# Patient Record
Sex: MALE | Race: WHITE | NOT HISPANIC OR LATINO | Employment: UNEMPLOYED | URBAN - METROPOLITAN AREA
[De-identification: names, ages, dates, MRNs, and addresses within clinical notes are randomized per-mention and may not be internally consistent; named-entity substitution may affect disease eponyms.]

---

## 2020-12-21 ENCOUNTER — CLINICAL SUPPORT (OUTPATIENT)
Dept: URGENT CARE | Facility: CLINIC | Age: 30
End: 2020-12-21
Payer: COMMERCIAL

## 2020-12-21 DIAGNOSIS — Z20.822 CLOSE EXPOSURE TO COVID-19 VIRUS: Primary | ICD-10-CM

## 2020-12-21 LAB
CTP QC/QA: YES
SARS-COV-2 RDRP RESP QL NAA+PROBE: NEGATIVE

## 2020-12-21 PROCEDURE — U0002: ICD-10-PCS | Mod: QW,S$GLB,, | Performed by: FAMILY MEDICINE

## 2020-12-21 PROCEDURE — U0002 COVID-19 LAB TEST NON-CDC: HCPCS | Mod: QW,S$GLB,, | Performed by: FAMILY MEDICINE

## 2021-01-07 ENCOUNTER — CLINICAL SUPPORT (OUTPATIENT)
Dept: URGENT CARE | Facility: CLINIC | Age: 31
End: 2021-01-07
Payer: COMMERCIAL

## 2021-01-07 DIAGNOSIS — R05.9 COUGH: Primary | ICD-10-CM

## 2021-01-07 LAB
CTP QC/QA: YES
SARS-COV-2 RDRP RESP QL NAA+PROBE: NEGATIVE

## 2021-01-07 PROCEDURE — U0002: ICD-10-PCS | Mod: QW,S$GLB,, | Performed by: FAMILY MEDICINE

## 2021-01-07 PROCEDURE — U0002 COVID-19 LAB TEST NON-CDC: HCPCS | Mod: QW,S$GLB,, | Performed by: FAMILY MEDICINE

## 2021-01-26 ENCOUNTER — OFFICE VISIT (OUTPATIENT)
Dept: URGENT CARE | Facility: CLINIC | Age: 31
End: 2021-01-26
Payer: COMMERCIAL

## 2021-01-26 VITALS
OXYGEN SATURATION: 98 % | HEIGHT: 60 IN | HEART RATE: 85 BPM | SYSTOLIC BLOOD PRESSURE: 125 MMHG | DIASTOLIC BLOOD PRESSURE: 73 MMHG | RESPIRATION RATE: 16 BRPM | WEIGHT: 95 LBS | BODY MASS INDEX: 18.65 KG/M2 | TEMPERATURE: 98 F

## 2021-01-26 DIAGNOSIS — J02.9 SORE THROAT: Primary | ICD-10-CM

## 2021-01-26 LAB
CTP QC/QA: YES
SARS-COV-2 RDRP RESP QL NAA+PROBE: NEGATIVE

## 2021-01-26 PROCEDURE — 3008F PR BODY MASS INDEX (BMI) DOCUMENTED: ICD-10-PCS | Mod: CPTII,S$GLB,, | Performed by: PHYSICIAN ASSISTANT

## 2021-01-26 PROCEDURE — 99203 OFFICE O/P NEW LOW 30 MIN: CPT | Mod: S$GLB,,, | Performed by: PHYSICIAN ASSISTANT

## 2021-01-26 PROCEDURE — U0002 COVID-19 LAB TEST NON-CDC: HCPCS | Mod: QW,S$GLB,, | Performed by: PHYSICIAN ASSISTANT

## 2021-01-26 PROCEDURE — 3008F BODY MASS INDEX DOCD: CPT | Mod: CPTII,S$GLB,, | Performed by: PHYSICIAN ASSISTANT

## 2021-01-26 PROCEDURE — 99203 PR OFFICE/OUTPT VISIT, NEW, LEVL III, 30-44 MIN: ICD-10-PCS | Mod: S$GLB,,, | Performed by: PHYSICIAN ASSISTANT

## 2021-01-26 PROCEDURE — U0002: ICD-10-PCS | Mod: QW,S$GLB,, | Performed by: PHYSICIAN ASSISTANT

## 2021-01-26 RX ORDER — ESTRADIOL 2 MG/1
2 TABLET ORAL DAILY
COMMUNITY
Start: 2021-01-04 | End: 2021-03-08

## 2021-01-26 RX ORDER — SPIRONOLACTONE 25 MG/1
25 TABLET ORAL DAILY
COMMUNITY
Start: 2021-01-04 | End: 2021-03-08

## 2021-02-02 ENCOUNTER — OFFICE VISIT (OUTPATIENT)
Dept: ENDOCRINOLOGY | Facility: CLINIC | Age: 31
End: 2021-02-02
Payer: COMMERCIAL

## 2021-02-02 ENCOUNTER — LAB VISIT (OUTPATIENT)
Dept: LAB | Facility: HOSPITAL | Age: 31
End: 2021-02-02
Attending: INTERNAL MEDICINE
Payer: COMMERCIAL

## 2021-02-02 VITALS
WEIGHT: 100.06 LBS | OXYGEN SATURATION: 98 % | HEART RATE: 89 BPM | HEIGHT: 60 IN | BODY MASS INDEX: 19.65 KG/M2 | SYSTOLIC BLOOD PRESSURE: 125 MMHG | DIASTOLIC BLOOD PRESSURE: 83 MMHG

## 2021-02-02 DIAGNOSIS — E29.1 HYPOGONADISM IN MALE: ICD-10-CM

## 2021-02-02 DIAGNOSIS — E03.9 ACQUIRED HYPOTHYROIDISM: ICD-10-CM

## 2021-02-02 DIAGNOSIS — Z85.841 H/O ASTROCYTOMA: ICD-10-CM

## 2021-02-02 DIAGNOSIS — E11.9 TYPE 2 DIABETES MELLITUS WITHOUT COMPLICATION, WITHOUT LONG-TERM CURRENT USE OF INSULIN: ICD-10-CM

## 2021-02-02 DIAGNOSIS — E78.2 MIXED DYSLIPIDEMIA: ICD-10-CM

## 2021-02-02 LAB
25(OH)D3+25(OH)D2 SERPL-MCNC: 31 NG/ML (ref 30–96)
ALBUMIN SERPL BCP-MCNC: 4.2 G/DL (ref 3.5–5.2)
ALP SERPL-CCNC: 59 U/L (ref 55–135)
ALT SERPL W/O P-5'-P-CCNC: 23 U/L (ref 10–44)
ANION GAP SERPL CALC-SCNC: 12 MMOL/L (ref 8–16)
AST SERPL-CCNC: 20 U/L (ref 10–40)
BILIRUB SERPL-MCNC: 0.2 MG/DL (ref 0.1–1)
BUN SERPL-MCNC: 24 MG/DL (ref 6–20)
C PEPTIDE SERPL-MCNC: 3.18 NG/ML (ref 0.78–5.19)
CALCIUM SERPL-MCNC: 9.5 MG/DL (ref 8.7–10.5)
CHLORIDE SERPL-SCNC: 111 MMOL/L (ref 95–110)
CHOLEST SERPL-MCNC: 174 MG/DL (ref 120–199)
CHOLEST/HDLC SERPL: 2.5 {RATIO} (ref 2–5)
CO2 SERPL-SCNC: 19 MMOL/L (ref 23–29)
CREAT SERPL-MCNC: 1.2 MG/DL (ref 0.5–1.4)
ERYTHROCYTE [DISTWIDTH] IN BLOOD BY AUTOMATED COUNT: 12.9 % (ref 11.5–14.5)
EST. GFR  (AFRICAN AMERICAN): >60 ML/MIN/1.73 M^2
EST. GFR  (NON AFRICAN AMERICAN): >60 ML/MIN/1.73 M^2
ESTIMATED AVG GLUCOSE: 108 MG/DL (ref 68–131)
ESTRADIOL SERPL-MCNC: 64 PG/ML (ref 11–44)
FSH SERPL-ACNC: 7.5 MIU/ML (ref 0.95–11.95)
GLUCOSE SERPL-MCNC: 98 MG/DL (ref 70–110)
HBA1C MFR BLD: 5.4 % (ref 4–5.6)
HCT VFR BLD AUTO: 35.2 % (ref 40–54)
HDLC SERPL-MCNC: 71 MG/DL (ref 40–75)
HDLC SERPL: 40.8 % (ref 20–50)
HGB BLD-MCNC: 11.6 G/DL (ref 14–18)
LDLC SERPL CALC-MCNC: 81.6 MG/DL (ref 63–159)
LH SERPL-ACNC: 7.2 MIU/ML (ref 0.6–12.1)
MCH RBC QN AUTO: 28.9 PG (ref 27–31)
MCHC RBC AUTO-ENTMCNC: 33 G/DL (ref 32–36)
MCV RBC AUTO: 88 FL (ref 82–98)
NONHDLC SERPL-MCNC: 103 MG/DL
PLATELET # BLD AUTO: 299 K/UL (ref 150–350)
PMV BLD AUTO: 10 FL (ref 9.2–12.9)
POTASSIUM SERPL-SCNC: 4.8 MMOL/L (ref 3.5–5.1)
PROT SERPL-MCNC: 8.2 G/DL (ref 6–8.4)
RBC # BLD AUTO: 4.01 M/UL (ref 4.6–6.2)
SODIUM SERPL-SCNC: 142 MMOL/L (ref 136–145)
T4 FREE SERPL-MCNC: 0.97 NG/DL (ref 0.71–1.51)
THYROPEROXIDASE IGG SERPL-ACNC: <6 IU/ML
TRIGL SERPL-MCNC: 107 MG/DL (ref 30–150)
TSH SERPL DL<=0.005 MIU/L-ACNC: 4.17 UIU/ML (ref 0.4–4)
WBC # BLD AUTO: 6.99 K/UL (ref 3.9–12.7)

## 2021-02-02 PROCEDURE — 86341 ISLET CELL ANTIBODY: CPT

## 2021-02-02 PROCEDURE — 83001 ASSAY OF GONADOTROPIN (FSH): CPT

## 2021-02-02 PROCEDURE — 1125F AMNT PAIN NOTED PAIN PRSNT: CPT | Mod: S$GLB,,, | Performed by: INTERNAL MEDICINE

## 2021-02-02 PROCEDURE — 3008F PR BODY MASS INDEX (BMI) DOCUMENTED: ICD-10-PCS | Mod: CPTII,S$GLB,, | Performed by: INTERNAL MEDICINE

## 2021-02-02 PROCEDURE — 80053 COMPREHEN METABOLIC PANEL: CPT

## 2021-02-02 PROCEDURE — 84403 ASSAY OF TOTAL TESTOSTERONE: CPT

## 2021-02-02 PROCEDURE — 99204 PR OFFICE/OUTPT VISIT, NEW, LEVL IV, 45-59 MIN: ICD-10-PCS | Mod: S$GLB,,, | Performed by: INTERNAL MEDICINE

## 2021-02-02 PROCEDURE — 86376 MICROSOMAL ANTIBODY EACH: CPT

## 2021-02-02 PROCEDURE — 85027 COMPLETE CBC AUTOMATED: CPT

## 2021-02-02 PROCEDURE — 84443 ASSAY THYROID STIM HORMONE: CPT

## 2021-02-02 PROCEDURE — 99999 PR PBB SHADOW E&M-EST. PATIENT-LVL IV: CPT | Mod: PBBFAC,,, | Performed by: INTERNAL MEDICINE

## 2021-02-02 PROCEDURE — 84439 ASSAY OF FREE THYROXINE: CPT

## 2021-02-02 PROCEDURE — 3008F BODY MASS INDEX DOCD: CPT | Mod: CPTII,S$GLB,, | Performed by: INTERNAL MEDICINE

## 2021-02-02 PROCEDURE — 82306 VITAMIN D 25 HYDROXY: CPT

## 2021-02-02 PROCEDURE — 99999 PR PBB SHADOW E&M-EST. PATIENT-LVL IV: ICD-10-PCS | Mod: PBBFAC,,, | Performed by: INTERNAL MEDICINE

## 2021-02-02 PROCEDURE — 1125F PR PAIN SEVERITY QUANTIFIED, PAIN PRESENT: ICD-10-PCS | Mod: S$GLB,,, | Performed by: INTERNAL MEDICINE

## 2021-02-02 PROCEDURE — 83002 ASSAY OF GONADOTROPIN (LH): CPT

## 2021-02-02 PROCEDURE — 84681 ASSAY OF C-PEPTIDE: CPT

## 2021-02-02 PROCEDURE — 80061 LIPID PANEL: CPT

## 2021-02-02 PROCEDURE — 83036 HEMOGLOBIN GLYCOSYLATED A1C: CPT

## 2021-02-02 PROCEDURE — 99204 OFFICE O/P NEW MOD 45 MIN: CPT | Mod: S$GLB,,, | Performed by: INTERNAL MEDICINE

## 2021-02-02 PROCEDURE — 82670 ASSAY OF TOTAL ESTRADIOL: CPT

## 2021-02-04 ENCOUNTER — TELEPHONE (OUTPATIENT)
Dept: ENDOCRINOLOGY | Facility: CLINIC | Age: 31
End: 2021-02-04

## 2021-02-07 LAB — GAD65 AB SER-SCNC: 0 NMOL/L

## 2021-02-08 LAB
ALBUMIN SERPL-MCNC: 4.6 G/DL (ref 3.6–5.1)
SHBG SERPL-SCNC: 38 NMOL/L (ref 10–50)
TESTOST FREE SERPL-MCNC: 2.6 PG/ML (ref 46–224)
TESTOST SERPL-MCNC: 25 NG/DL (ref 250–1100)
TESTOSTERONE.FREE+WB SERPL-MCNC: 5.5 NG/DL (ref 110–575)

## 2021-02-09 ENCOUNTER — TELEPHONE (OUTPATIENT)
Dept: ENDOCRINOLOGY | Facility: CLINIC | Age: 31
End: 2021-02-09

## 2021-02-11 ENCOUNTER — OFFICE VISIT (OUTPATIENT)
Dept: OPTOMETRY | Facility: CLINIC | Age: 31
End: 2021-02-11
Payer: COMMERCIAL

## 2021-02-11 DIAGNOSIS — E11.9 TYPE 2 DIABETES MELLITUS WITHOUT COMPLICATION, WITHOUT LONG-TERM CURRENT USE OF INSULIN: ICD-10-CM

## 2021-02-11 DIAGNOSIS — H52.03 HYPEROPIA, BILATERAL: ICD-10-CM

## 2021-02-11 DIAGNOSIS — E11.3513 PROLIFERATIVE DIABETIC RETINOPATHY OF BOTH EYES WITH MACULAR EDEMA ASSOCIATED WITH TYPE 2 DIABETES MELLITUS: Primary | ICD-10-CM

## 2021-02-11 DIAGNOSIS — Z13.5 GLAUCOMA SCREENING: ICD-10-CM

## 2021-02-11 PROCEDURE — 99999 PR PBB SHADOW E&M-EST. PATIENT-LVL III: CPT | Mod: PBBFAC,,, | Performed by: OPTOMETRIST

## 2021-02-11 PROCEDURE — 1126F AMNT PAIN NOTED NONE PRSNT: CPT | Mod: S$GLB,,, | Performed by: OPTOMETRIST

## 2021-02-11 PROCEDURE — 99999 PR PBB SHADOW E&M-EST. PATIENT-LVL III: ICD-10-PCS | Mod: PBBFAC,,, | Performed by: OPTOMETRIST

## 2021-02-11 PROCEDURE — 1126F PR PAIN SEVERITY QUANTIFIED, NO PAIN PRESENT: ICD-10-PCS | Mod: S$GLB,,, | Performed by: OPTOMETRIST

## 2021-02-11 PROCEDURE — 92004 PR EYE EXAM, NEW PATIENT,COMPREHESV: ICD-10-PCS | Mod: S$GLB,,, | Performed by: OPTOMETRIST

## 2021-02-11 PROCEDURE — 92134 CPTRZ OPH DX IMG PST SGM RTA: CPT | Mod: S$GLB,,, | Performed by: OPTOMETRIST

## 2021-02-11 PROCEDURE — 92134 POSTERIOR SEGMENT OCT RETINA (OCULAR COHERENCE TOMOGRAPHY)-BOTH EYES: ICD-10-PCS | Mod: S$GLB,,, | Performed by: OPTOMETRIST

## 2021-02-11 PROCEDURE — 92004 COMPRE OPH EXAM NEW PT 1/>: CPT | Mod: S$GLB,,, | Performed by: OPTOMETRIST

## 2021-02-16 ENCOUNTER — PATIENT MESSAGE (OUTPATIENT)
Dept: ENDOCRINOLOGY | Facility: CLINIC | Age: 31
End: 2021-02-16

## 2021-02-17 ENCOUNTER — PATIENT MESSAGE (OUTPATIENT)
Dept: ENDOCRINOLOGY | Facility: CLINIC | Age: 31
End: 2021-02-17

## 2021-03-08 ENCOUNTER — OFFICE VISIT (OUTPATIENT)
Dept: ENDOCRINOLOGY | Facility: CLINIC | Age: 31
End: 2021-03-08
Payer: COMMERCIAL

## 2021-03-08 DIAGNOSIS — E11.319 TYPE 2 DIABETES MELLITUS WITH RETINOPATHY, WITHOUT LONG-TERM CURRENT USE OF INSULIN, MACULAR EDEMA PRESENCE UNSPECIFIED, UNSPECIFIED LATERALITY, UNSPECIFIED RETINOPATHY SEVERITY: ICD-10-CM

## 2021-03-08 DIAGNOSIS — Z85.841 H/O ASTROCYTOMA: ICD-10-CM

## 2021-03-08 DIAGNOSIS — E03.9 ACQUIRED HYPOTHYROIDISM: ICD-10-CM

## 2021-03-08 DIAGNOSIS — E29.1 TESTICULAR FAILURE: ICD-10-CM

## 2021-03-08 DIAGNOSIS — E78.2 MIXED DYSLIPIDEMIA: ICD-10-CM

## 2021-03-08 PROCEDURE — 3044F PR MOST RECENT HEMOGLOBIN A1C LEVEL <7.0%: ICD-10-PCS | Mod: CPTII,,, | Performed by: INTERNAL MEDICINE

## 2021-03-08 PROCEDURE — 3044F HG A1C LEVEL LT 7.0%: CPT | Mod: CPTII,,, | Performed by: INTERNAL MEDICINE

## 2021-03-08 PROCEDURE — 99214 PR OFFICE/OUTPT VISIT, EST, LEVL IV, 30-39 MIN: ICD-10-PCS | Mod: 95,,, | Performed by: INTERNAL MEDICINE

## 2021-03-08 PROCEDURE — 99214 OFFICE O/P EST MOD 30 MIN: CPT | Mod: 95,,, | Performed by: INTERNAL MEDICINE

## 2021-03-08 RX ORDER — FINASTERIDE 5 MG/1
5 TABLET, FILM COATED ORAL DAILY
Qty: 90 TABLET | Refills: 3 | Status: SHIPPED | OUTPATIENT
Start: 2021-03-08 | End: 2021-10-11 | Stop reason: SDUPTHER

## 2021-03-08 RX ORDER — ESTRADIOL 2 MG/1
2 TABLET ORAL 2 TIMES DAILY
Qty: 60 TABLET | Refills: 11 | Status: SHIPPED | OUTPATIENT
Start: 2021-03-08 | End: 2021-06-29 | Stop reason: SDUPTHER

## 2021-03-26 ENCOUNTER — PATIENT MESSAGE (OUTPATIENT)
Dept: ENDOCRINOLOGY | Facility: CLINIC | Age: 31
End: 2021-03-26

## 2021-03-26 DIAGNOSIS — Z85.841 H/O ASTROCYTOMA: Primary | ICD-10-CM

## 2021-04-12 ENCOUNTER — OFFICE VISIT (OUTPATIENT)
Dept: PSYCHIATRY | Facility: CLINIC | Age: 31
End: 2021-04-12
Payer: MEDICAID

## 2021-04-12 DIAGNOSIS — F40.10 SOCIAL FEARS: ICD-10-CM

## 2021-04-12 DIAGNOSIS — F43.23 ADJUSTMENT DISORDER WITH MIXED ANXIETY AND DEPRESSED MOOD: ICD-10-CM

## 2021-04-12 PROCEDURE — 90791 PSYCH DIAGNOSTIC EVALUATION: CPT | Mod: HP,HB,95, | Performed by: SOCIAL WORKER

## 2021-04-12 PROCEDURE — 90791 PR PSYCHIATRIC DIAGNOSTIC EVALUATION: ICD-10-PCS | Mod: HP,HB,95, | Performed by: SOCIAL WORKER

## 2021-04-25 ENCOUNTER — PATIENT MESSAGE (OUTPATIENT)
Dept: ENDOCRINOLOGY | Facility: CLINIC | Age: 31
End: 2021-04-25

## 2021-04-25 ENCOUNTER — PATIENT MESSAGE (OUTPATIENT)
Dept: OPTOMETRY | Facility: CLINIC | Age: 31
End: 2021-04-25

## 2021-04-25 DIAGNOSIS — C71.9 MALIGNANT NEOPLASM OF BRAIN, UNSPECIFIED LOCATION: ICD-10-CM

## 2021-04-27 ENCOUNTER — PATIENT MESSAGE (OUTPATIENT)
Dept: ENDOCRINOLOGY | Facility: CLINIC | Age: 31
End: 2021-04-27

## 2021-05-03 ENCOUNTER — OFFICE VISIT (OUTPATIENT)
Dept: PSYCHIATRY | Facility: CLINIC | Age: 31
End: 2021-05-03
Payer: MEDICAID

## 2021-05-03 DIAGNOSIS — F40.10 SOCIAL FEARS: ICD-10-CM

## 2021-05-03 DIAGNOSIS — F43.23 ADJUSTMENT DISORDER WITH MIXED ANXIETY AND DEPRESSED MOOD: Primary | ICD-10-CM

## 2021-05-03 PROCEDURE — 90834 PR PSYCHOTHERAPY W/PATIENT, 45 MIN: ICD-10-PCS | Mod: 95,HP,HB, | Performed by: SOCIAL WORKER

## 2021-05-03 PROCEDURE — 90834 PSYTX W PT 45 MINUTES: CPT | Mod: 95,HP,HB, | Performed by: SOCIAL WORKER

## 2021-05-06 ENCOUNTER — PATIENT MESSAGE (OUTPATIENT)
Dept: RESEARCH | Facility: HOSPITAL | Age: 31
End: 2021-05-06

## 2021-05-10 ENCOUNTER — PATIENT MESSAGE (OUTPATIENT)
Dept: ENDOCRINOLOGY | Facility: CLINIC | Age: 31
End: 2021-05-10

## 2021-05-10 ENCOUNTER — HOSPITAL ENCOUNTER (OUTPATIENT)
Dept: RADIOLOGY | Facility: HOSPITAL | Age: 31
Discharge: HOME | End: 2021-05-10
Attending: INTERNAL MEDICINE
Payer: MEDICAID

## 2021-05-10 ENCOUNTER — PATIENT MESSAGE (OUTPATIENT)
Dept: NEUROSURGERY | Facility: CLINIC | Age: 31
End: 2021-05-10

## 2021-05-10 DIAGNOSIS — C71.9 MALIGNANT NEOPLASM OF BRAIN, UNSPECIFIED LOCATION: ICD-10-CM

## 2021-05-11 ENCOUNTER — PATIENT MESSAGE (OUTPATIENT)
Dept: NEUROSURGERY | Facility: CLINIC | Age: 31
End: 2021-05-11

## 2021-05-11 ENCOUNTER — OFFICE VISIT (OUTPATIENT)
Dept: NEUROSURGERY | Facility: CLINIC | Age: 31
End: 2021-05-11
Attending: INTERNAL MEDICINE
Payer: MEDICAID

## 2021-05-11 VITALS — TEMPERATURE: 97 F

## 2021-05-11 DIAGNOSIS — Z85.841 H/O ASTROCYTOMA: ICD-10-CM

## 2021-05-11 PROCEDURE — 99999 PR PBB SHADOW E&M-EST. PATIENT-LVL III: ICD-10-PCS | Mod: PBBFAC,,, | Performed by: NEUROLOGICAL SURGERY

## 2021-05-11 PROCEDURE — 99213 OFFICE O/P EST LOW 20 MIN: CPT | Mod: PBBFAC | Performed by: NEUROLOGICAL SURGERY

## 2021-05-11 PROCEDURE — 99999 PR PBB SHADOW E&M-EST. PATIENT-LVL III: CPT | Mod: PBBFAC,,, | Performed by: NEUROLOGICAL SURGERY

## 2021-05-11 PROCEDURE — 99204 PR OFFICE/OUTPT VISIT, NEW, LEVL IV, 45-59 MIN: ICD-10-PCS | Mod: S$PBB,,, | Performed by: NEUROLOGICAL SURGERY

## 2021-05-11 PROCEDURE — 99204 OFFICE O/P NEW MOD 45 MIN: CPT | Mod: S$PBB,,, | Performed by: NEUROLOGICAL SURGERY

## 2021-05-11 RX ORDER — LANCETS 33 GAUGE
EACH MISCELLANEOUS
COMMUNITY
Start: 2021-02-17 | End: 2022-05-25

## 2021-05-26 ENCOUNTER — PATIENT MESSAGE (OUTPATIENT)
Dept: OPTOMETRY | Facility: CLINIC | Age: 31
End: 2021-05-26

## 2021-05-27 ENCOUNTER — PATIENT MESSAGE (OUTPATIENT)
Dept: NEUROSURGERY | Facility: CLINIC | Age: 31
End: 2021-05-27

## 2021-06-01 ENCOUNTER — OFFICE VISIT (OUTPATIENT)
Dept: PSYCHIATRY | Facility: CLINIC | Age: 31
End: 2021-06-01
Payer: MEDICAID

## 2021-06-01 DIAGNOSIS — E78.2 MIXED DYSLIPIDEMIA: ICD-10-CM

## 2021-06-01 DIAGNOSIS — F40.10 SOCIAL FEARS: ICD-10-CM

## 2021-06-01 DIAGNOSIS — F43.23 ADJUSTMENT DISORDER WITH MIXED ANXIETY AND DEPRESSED MOOD: ICD-10-CM

## 2021-06-01 DIAGNOSIS — F64.0 GENDER DYSPHORIA IN ADULT: Primary | ICD-10-CM

## 2021-06-01 PROCEDURE — 3061F NEG MICROALBUMINURIA REV: CPT | Mod: HP,HB,CPTII,95 | Performed by: SOCIAL WORKER

## 2021-06-01 PROCEDURE — 1159F PR MEDICATION LIST DOCUMENTED IN MEDICAL RECORD: ICD-10-PCS | Mod: HP,HB,CPTII,95 | Performed by: SOCIAL WORKER

## 2021-06-01 PROCEDURE — 3066F PR DOCUMENTATION OF TREATMENT FOR NEPHROPATHY: ICD-10-PCS | Mod: HP,HB,CPTII,95 | Performed by: SOCIAL WORKER

## 2021-06-01 PROCEDURE — 3044F HG A1C LEVEL LT 7.0%: CPT | Mod: HP,HB,CPTII,95 | Performed by: SOCIAL WORKER

## 2021-06-01 PROCEDURE — 90834 PR PSYCHOTHERAPY W/PATIENT, 45 MIN: ICD-10-PCS | Mod: HP,HB,95, | Performed by: SOCIAL WORKER

## 2021-06-01 PROCEDURE — 90834 PSYTX W PT 45 MINUTES: CPT | Mod: HP,HB,95, | Performed by: SOCIAL WORKER

## 2021-06-01 PROCEDURE — 3044F PR MOST RECENT HEMOGLOBIN A1C LEVEL <7.0%: ICD-10-PCS | Mod: HP,HB,CPTII,95 | Performed by: SOCIAL WORKER

## 2021-06-01 PROCEDURE — 3066F NEPHROPATHY DOC TX: CPT | Mod: HP,HB,CPTII,95 | Performed by: SOCIAL WORKER

## 2021-06-01 PROCEDURE — 1159F MED LIST DOCD IN RCRD: CPT | Mod: HP,HB,CPTII,95 | Performed by: SOCIAL WORKER

## 2021-06-01 PROCEDURE — 3061F PR NEG MICROALBUMINURIA RESULT DOCUMENTED/REVIEW: ICD-10-PCS | Mod: HP,HB,CPTII,95 | Performed by: SOCIAL WORKER

## 2021-06-22 ENCOUNTER — LAB VISIT (OUTPATIENT)
Dept: LAB | Facility: HOSPITAL | Age: 31
End: 2021-06-22
Attending: INTERNAL MEDICINE
Payer: MEDICAID

## 2021-06-22 ENCOUNTER — OFFICE VISIT (OUTPATIENT)
Dept: PSYCHIATRY | Facility: CLINIC | Age: 31
End: 2021-06-22
Payer: MEDICAID

## 2021-06-22 DIAGNOSIS — F43.23 ADJUSTMENT DISORDER WITH MIXED ANXIETY AND DEPRESSED MOOD: Primary | ICD-10-CM

## 2021-06-22 DIAGNOSIS — E11.319 TYPE 2 DIABETES MELLITUS WITH RETINOPATHY, WITHOUT LONG-TERM CURRENT USE OF INSULIN, MACULAR EDEMA PRESENCE UNSPECIFIED, UNSPECIFIED LATERALITY, UNSPECIFIED RETINOPATHY SEVERITY: ICD-10-CM

## 2021-06-22 DIAGNOSIS — Z85.841 H/O ASTROCYTOMA: ICD-10-CM

## 2021-06-22 DIAGNOSIS — F40.10 SOCIAL FEARS: ICD-10-CM

## 2021-06-22 LAB
CORTIS SERPL-MCNC: 22.8 UG/DL (ref 4.3–22.4)
ESTIMATED AVG GLUCOSE: 97 MG/DL (ref 68–131)
ESTRADIOL SERPL-MCNC: 74 PG/ML (ref 11–44)
HBA1C MFR BLD: 5 % (ref 4–5.6)
TESTOST SERPL-MCNC: 20 NG/DL (ref 304–1227)

## 2021-06-22 PROCEDURE — 84403 ASSAY OF TOTAL TESTOSTERONE: CPT | Performed by: INTERNAL MEDICINE

## 2021-06-22 PROCEDURE — 82533 TOTAL CORTISOL: CPT | Performed by: INTERNAL MEDICINE

## 2021-06-22 PROCEDURE — 90834 PSYTX W PT 45 MINUTES: CPT | Mod: 95,HP,HB, | Performed by: SOCIAL WORKER

## 2021-06-22 PROCEDURE — 82670 ASSAY OF TOTAL ESTRADIOL: CPT | Performed by: INTERNAL MEDICINE

## 2021-06-22 PROCEDURE — 90834 PR PSYCHOTHERAPY W/PATIENT, 45 MIN: ICD-10-PCS | Mod: 95,HP,HB, | Performed by: SOCIAL WORKER

## 2021-06-22 PROCEDURE — 83036 HEMOGLOBIN GLYCOSYLATED A1C: CPT | Performed by: INTERNAL MEDICINE

## 2021-06-22 PROCEDURE — 82024 ASSAY OF ACTH: CPT | Performed by: INTERNAL MEDICINE

## 2021-06-22 PROCEDURE — 84305 ASSAY OF SOMATOMEDIN: CPT | Performed by: INTERNAL MEDICINE

## 2021-06-23 ENCOUNTER — HOSPITAL ENCOUNTER (OUTPATIENT)
Dept: RADIOLOGY | Facility: HOSPITAL | Age: 31
Discharge: HOME | End: 2021-06-23
Attending: NEUROLOGICAL SURGERY
Payer: MEDICAID

## 2021-06-23 DIAGNOSIS — Z85.841 H/O ASTROCYTOMA: ICD-10-CM

## 2021-06-23 PROCEDURE — 70553 MRI BRAIN STEM W/O & W/DYE: CPT | Mod: TC,PO

## 2021-06-23 PROCEDURE — 70553 MRI BRAIN W WO CONTRAST: ICD-10-PCS | Mod: 26,,, | Performed by: RADIOLOGY

## 2021-06-23 PROCEDURE — 25500020 PHARM REV CODE 255: Mod: PO | Performed by: NEUROLOGICAL SURGERY

## 2021-06-23 PROCEDURE — 70553 MRI BRAIN STEM W/O & W/DYE: CPT | Mod: 26,,, | Performed by: RADIOLOGY

## 2021-06-23 PROCEDURE — A9585 GADOBUTROL INJECTION: HCPCS | Mod: PO | Performed by: NEUROLOGICAL SURGERY

## 2021-06-23 RX ORDER — GADOBUTROL 604.72 MG/ML
4 INJECTION INTRAVENOUS
Status: COMPLETED | OUTPATIENT
Start: 2021-06-23 | End: 2021-06-23

## 2021-06-23 RX ADMIN — GADOBUTROL 4 ML: 604.72 INJECTION INTRAVENOUS at 10:06

## 2021-06-24 LAB
IGF-I SERPL-MCNC: 91 NG/ML (ref 60–329)
IGF-I Z-SCORE SERPL: -1.28 SD

## 2021-06-25 LAB — ACTH PLAS-MCNC: 30 PG/ML (ref 0–46)

## 2021-06-29 ENCOUNTER — OFFICE VISIT (OUTPATIENT)
Dept: ENDOCRINOLOGY | Facility: CLINIC | Age: 31
End: 2021-06-29
Payer: MEDICAID

## 2021-06-29 DIAGNOSIS — E11.319 TYPE 2 DIABETES MELLITUS WITH RETINOPATHY, WITHOUT LONG-TERM CURRENT USE OF INSULIN, MACULAR EDEMA PRESENCE UNSPECIFIED, UNSPECIFIED LATERALITY, UNSPECIFIED RETINOPATHY SEVERITY: ICD-10-CM

## 2021-06-29 DIAGNOSIS — E78.2 MIXED DYSLIPIDEMIA: ICD-10-CM

## 2021-06-29 DIAGNOSIS — Z85.841 H/O ASTROCYTOMA: ICD-10-CM

## 2021-06-29 DIAGNOSIS — E03.9 ACQUIRED HYPOTHYROIDISM: ICD-10-CM

## 2021-06-29 DIAGNOSIS — E29.1 TESTICULAR FAILURE: ICD-10-CM

## 2021-06-29 PROCEDURE — 99214 PR OFFICE/OUTPT VISIT, EST, LEVL IV, 30-39 MIN: ICD-10-PCS | Mod: 95,,, | Performed by: INTERNAL MEDICINE

## 2021-06-29 PROCEDURE — 99214 OFFICE O/P EST MOD 30 MIN: CPT | Mod: 95,,, | Performed by: INTERNAL MEDICINE

## 2021-06-29 RX ORDER — ESTRADIOL 2 MG/1
3 TABLET ORAL 2 TIMES DAILY
Qty: 90 TABLET | Refills: 11 | Status: SHIPPED | OUTPATIENT
Start: 2021-06-29 | End: 2021-09-07

## 2021-07-02 ENCOUNTER — PATIENT MESSAGE (OUTPATIENT)
Dept: ENDOCRINOLOGY | Facility: CLINIC | Age: 31
End: 2021-07-02

## 2021-07-20 ENCOUNTER — OFFICE VISIT (OUTPATIENT)
Dept: NEUROSURGERY | Facility: CLINIC | Age: 31
End: 2021-07-20
Payer: MEDICAID

## 2021-07-20 DIAGNOSIS — Z85.841 H/O ASTROCYTOMA: Primary | ICD-10-CM

## 2021-07-20 PROCEDURE — 99214 OFFICE O/P EST MOD 30 MIN: CPT | Mod: 95,,, | Performed by: NEUROLOGICAL SURGERY

## 2021-07-20 PROCEDURE — 99214 PR OFFICE/OUTPT VISIT, EST, LEVL IV, 30-39 MIN: ICD-10-PCS | Mod: 95,,, | Performed by: NEUROLOGICAL SURGERY

## 2021-07-22 ENCOUNTER — PATIENT MESSAGE (OUTPATIENT)
Dept: ENDOCRINOLOGY | Facility: CLINIC | Age: 31
End: 2021-07-22

## 2021-08-03 ENCOUNTER — OFFICE VISIT (OUTPATIENT)
Dept: PSYCHIATRY | Facility: CLINIC | Age: 31
End: 2021-08-03
Payer: MEDICAID

## 2021-08-03 DIAGNOSIS — F40.10 SOCIAL FEARS: ICD-10-CM

## 2021-08-03 DIAGNOSIS — F43.23 ADJUSTMENT DISORDER WITH MIXED ANXIETY AND DEPRESSED MOOD: Primary | ICD-10-CM

## 2021-08-03 PROCEDURE — 90834 PSYTX W PT 45 MINUTES: CPT | Mod: 95,HP,HB, | Performed by: SOCIAL WORKER

## 2021-08-03 PROCEDURE — 90834 PR PSYCHOTHERAPY W/PATIENT, 45 MIN: ICD-10-PCS | Mod: 95,HP,HB, | Performed by: SOCIAL WORKER

## 2021-09-03 ENCOUNTER — PATIENT MESSAGE (OUTPATIENT)
Dept: NEUROSURGERY | Facility: CLINIC | Age: 31
End: 2021-09-03

## 2021-09-14 ENCOUNTER — PATIENT MESSAGE (OUTPATIENT)
Dept: PSYCHIATRY | Facility: CLINIC | Age: 31
End: 2021-09-14

## 2021-10-04 ENCOUNTER — LAB VISIT (OUTPATIENT)
Dept: LAB | Facility: HOSPITAL | Age: 31
End: 2021-10-04
Attending: INTERNAL MEDICINE
Payer: MEDICAID

## 2021-10-04 DIAGNOSIS — E29.1 TESTICULAR FAILURE: ICD-10-CM

## 2021-10-04 DIAGNOSIS — E11.319 TYPE 2 DIABETES MELLITUS WITH RETINOPATHY, WITHOUT LONG-TERM CURRENT USE OF INSULIN, MACULAR EDEMA PRESENCE UNSPECIFIED, UNSPECIFIED LATERALITY, UNSPECIFIED RETINOPATHY SEVERITY: ICD-10-CM

## 2021-10-04 DIAGNOSIS — E03.9 ACQUIRED HYPOTHYROIDISM: ICD-10-CM

## 2021-10-04 LAB
ESTIMATED AVG GLUCOSE: 103 MG/DL (ref 68–131)
HBA1C MFR BLD: 5.2 % (ref 4–5.6)
T4 FREE SERPL-MCNC: 0.96 NG/DL (ref 0.71–1.51)
TSH SERPL DL<=0.005 MIU/L-ACNC: 4.8 UIU/ML (ref 0.4–4)

## 2021-10-04 PROCEDURE — 80053 COMPREHEN METABOLIC PANEL: CPT | Performed by: INTERNAL MEDICINE

## 2021-10-04 PROCEDURE — 84443 ASSAY THYROID STIM HORMONE: CPT | Performed by: INTERNAL MEDICINE

## 2021-10-04 PROCEDURE — 84439 ASSAY OF FREE THYROXINE: CPT | Performed by: INTERNAL MEDICINE

## 2021-10-04 PROCEDURE — 82670 ASSAY OF TOTAL ESTRADIOL: CPT | Performed by: INTERNAL MEDICINE

## 2021-10-04 PROCEDURE — 84403 ASSAY OF TOTAL TESTOSTERONE: CPT | Performed by: INTERNAL MEDICINE

## 2021-10-04 PROCEDURE — 36415 COLL VENOUS BLD VENIPUNCTURE: CPT | Mod: PO | Performed by: INTERNAL MEDICINE

## 2021-10-04 PROCEDURE — 83036 HEMOGLOBIN GLYCOSYLATED A1C: CPT | Performed by: INTERNAL MEDICINE

## 2021-10-11 ENCOUNTER — OFFICE VISIT (OUTPATIENT)
Dept: PSYCHIATRY | Facility: CLINIC | Age: 31
End: 2021-10-11
Payer: MEDICAID

## 2021-10-11 ENCOUNTER — OFFICE VISIT (OUTPATIENT)
Dept: ENDOCRINOLOGY | Facility: CLINIC | Age: 31
End: 2021-10-11
Payer: MEDICAID

## 2021-10-11 DIAGNOSIS — F64.0 GENDER DYSPHORIA IN ADULT: ICD-10-CM

## 2021-10-11 DIAGNOSIS — E29.1 TESTICULAR FAILURE: ICD-10-CM

## 2021-10-11 DIAGNOSIS — E78.2 MIXED DYSLIPIDEMIA: ICD-10-CM

## 2021-10-11 DIAGNOSIS — E11.319 TYPE 2 DIABETES MELLITUS WITH RETINOPATHY, WITHOUT LONG-TERM CURRENT USE OF INSULIN, MACULAR EDEMA PRESENCE UNSPECIFIED, UNSPECIFIED LATERALITY, UNSPECIFIED RETINOPATHY SEVERITY: ICD-10-CM

## 2021-10-11 DIAGNOSIS — E03.8 SUBCLINICAL HYPOTHYROIDISM: ICD-10-CM

## 2021-10-11 DIAGNOSIS — F43.23 ADJUSTMENT DISORDER WITH MIXED ANXIETY AND DEPRESSED MOOD: Primary | ICD-10-CM

## 2021-10-11 DIAGNOSIS — Z60.0 PHASE OF LIFE PROBLEM: ICD-10-CM

## 2021-10-11 DIAGNOSIS — Z91.89 AT HIGH RISK FOR ALTERED FAMILY DYNAMICS: ICD-10-CM

## 2021-10-11 DIAGNOSIS — Z85.841 H/O ASTROCYTOMA: ICD-10-CM

## 2021-10-11 DIAGNOSIS — Z65.8 INTERPERSONAL PROBLEM: ICD-10-CM

## 2021-10-11 DIAGNOSIS — F40.10 SOCIAL FEARS: ICD-10-CM

## 2021-10-11 PROCEDURE — 90834 PR PSYCHOTHERAPY W/PATIENT, 45 MIN: ICD-10-PCS | Mod: HP,HB,95, | Performed by: SOCIAL WORKER

## 2021-10-11 PROCEDURE — 90834 PSYTX W PT 45 MINUTES: CPT | Mod: HP,HB,95, | Performed by: SOCIAL WORKER

## 2021-10-11 PROCEDURE — 99214 PR OFFICE/OUTPT VISIT, EST, LEVL IV, 30-39 MIN: ICD-10-PCS | Mod: 95,KX,, | Performed by: INTERNAL MEDICINE

## 2021-10-11 PROCEDURE — 99214 OFFICE O/P EST MOD 30 MIN: CPT | Mod: 95,KX,, | Performed by: INTERNAL MEDICINE

## 2021-10-11 RX ORDER — FINASTERIDE 5 MG/1
5 TABLET, FILM COATED ORAL DAILY
Qty: 90 TABLET | Refills: 3 | Status: SHIPPED | OUTPATIENT
Start: 2021-10-11 | End: 2022-02-18 | Stop reason: SDUPTHER

## 2021-10-11 RX ORDER — ESTRADIOL 2 MG/1
3 TABLET ORAL 2 TIMES DAILY
Qty: 270 TABLET | Refills: 3 | Status: SHIPPED | OUTPATIENT
Start: 2021-10-11 | End: 2022-02-18 | Stop reason: SDUPTHER

## 2021-10-11 RX ORDER — PROGESTERONE 100 MG/1
100 CAPSULE ORAL DAILY
Qty: 30 CAPSULE | Refills: 6 | Status: SHIPPED | OUTPATIENT
Start: 2021-10-11 | End: 2022-02-18 | Stop reason: SDUPTHER

## 2021-10-11 SDOH — SOCIAL DETERMINANTS OF HEALTH (SDOH): PROBLEMS OF ADJUSTMENT TO LIFE-CYCLE TRANSITIONS: Z60.0

## 2021-10-12 PROBLEM — Z65.8 INTERPERSONAL PROBLEM: Status: ACTIVE | Noted: 2021-10-12

## 2021-10-12 PROBLEM — F64.0 GENDER DYSPHORIA IN ADULT: Status: ACTIVE | Noted: 2021-10-12

## 2021-10-12 PROBLEM — Z91.89: Status: ACTIVE | Noted: 2021-10-12

## 2021-10-12 PROBLEM — Z60.0 PHASE OF LIFE PROBLEM: Status: ACTIVE | Noted: 2021-10-12

## 2021-10-12 LAB
ALBUMIN SERPL BCP-MCNC: 4 G/DL (ref 3.5–5.2)
ALP SERPL-CCNC: 36 U/L (ref 55–135)
ALT SERPL W/O P-5'-P-CCNC: 22 U/L (ref 10–44)
ANION GAP SERPL CALC-SCNC: 12 MMOL/L (ref 8–16)
AST SERPL-CCNC: 22 U/L (ref 10–40)
BILIRUB SERPL-MCNC: 0.3 MG/DL (ref 0.1–1)
BUN SERPL-MCNC: 27 MG/DL (ref 6–20)
CALCIUM SERPL-MCNC: 9.1 MG/DL (ref 8.7–10.5)
CHLORIDE SERPL-SCNC: 103 MMOL/L (ref 95–110)
CO2 SERPL-SCNC: 20 MMOL/L (ref 23–29)
CREAT SERPL-MCNC: 1.4 MG/DL (ref 0.5–1.4)
EST. GFR  (AFRICAN AMERICAN): 58.6 ML/MIN/1.73 M^2
EST. GFR  (NON AFRICAN AMERICAN): 50.8 ML/MIN/1.73 M^2
ESTRADIOL SERPL-MCNC: 202 PG/ML
GLUCOSE SERPL-MCNC: 94 MG/DL (ref 70–110)
POTASSIUM SERPL-SCNC: 4.5 MMOL/L (ref 3.5–5.1)
PROT SERPL-MCNC: 7.5 G/DL (ref 6–8.4)
SODIUM SERPL-SCNC: 135 MMOL/L (ref 136–145)
TESTOST SERPL-MCNC: 26 NG/DL (ref 5–73)

## 2021-10-14 ENCOUNTER — PATIENT MESSAGE (OUTPATIENT)
Dept: ENDOCRINOLOGY | Facility: CLINIC | Age: 31
End: 2021-10-14
Payer: MEDICAID

## 2021-10-14 ENCOUNTER — OFFICE VISIT (OUTPATIENT)
Dept: URGENT CARE | Facility: CLINIC | Age: 31
End: 2021-10-14
Payer: MEDICAID

## 2021-10-14 VITALS
DIASTOLIC BLOOD PRESSURE: 78 MMHG | BODY MASS INDEX: 19.63 KG/M2 | SYSTOLIC BLOOD PRESSURE: 136 MMHG | HEART RATE: 106 BPM | HEIGHT: 60 IN | WEIGHT: 100 LBS | OXYGEN SATURATION: 98 % | TEMPERATURE: 99 F | RESPIRATION RATE: 16 BRPM

## 2021-10-14 DIAGNOSIS — R31.9 URINARY TRACT INFECTION WITH HEMATURIA, SITE UNSPECIFIED: Primary | ICD-10-CM

## 2021-10-14 DIAGNOSIS — N39.0 URINARY TRACT INFECTION WITH HEMATURIA, SITE UNSPECIFIED: Primary | ICD-10-CM

## 2021-10-14 DIAGNOSIS — R30.0 DYSURIA: ICD-10-CM

## 2021-10-14 LAB
BILIRUB UR QL STRIP: NEGATIVE
GLUCOSE UR QL STRIP: NEGATIVE
KETONES UR QL STRIP: NEGATIVE
LEUKOCYTE ESTERASE UR QL STRIP: NEGATIVE
PH, POC UA: 5 (ref 5–8)
POC BLOOD, URINE: POSITIVE
POC NITRATES, URINE: NEGATIVE
PROT UR QL STRIP: NEGATIVE
SP GR UR STRIP: 1.01 (ref 1–1.03)
UROBILINOGEN UR STRIP-ACNC: ABNORMAL (ref 0.1–1.1)

## 2021-10-14 PROCEDURE — 87077 CULTURE AEROBIC IDENTIFY: CPT | Performed by: PHYSICIAN ASSISTANT

## 2021-10-14 PROCEDURE — 81003 URINALYSIS AUTO W/O SCOPE: CPT | Mod: QW,S$GLB,, | Performed by: PHYSICIAN ASSISTANT

## 2021-10-14 PROCEDURE — 99213 OFFICE O/P EST LOW 20 MIN: CPT | Mod: 25,S$GLB,, | Performed by: PHYSICIAN ASSISTANT

## 2021-10-14 PROCEDURE — 87186 SC STD MICRODIL/AGAR DIL: CPT | Performed by: PHYSICIAN ASSISTANT

## 2021-10-14 PROCEDURE — 87086 URINE CULTURE/COLONY COUNT: CPT | Performed by: PHYSICIAN ASSISTANT

## 2021-10-14 PROCEDURE — 99213 PR OFFICE/OUTPT VISIT, EST, LEVL III, 20-29 MIN: ICD-10-PCS | Mod: 25,S$GLB,, | Performed by: PHYSICIAN ASSISTANT

## 2021-10-14 PROCEDURE — 87088 URINE BACTERIA CULTURE: CPT | Performed by: PHYSICIAN ASSISTANT

## 2021-10-14 PROCEDURE — 81003 POCT URINALYSIS, DIPSTICK, AUTOMATED, W/O SCOPE: ICD-10-PCS | Mod: QW,S$GLB,, | Performed by: PHYSICIAN ASSISTANT

## 2021-10-14 RX ORDER — NITROFURANTOIN 25; 75 MG/1; MG/1
100 CAPSULE ORAL 2 TIMES DAILY
Qty: 10 CAPSULE | Refills: 0 | Status: SHIPPED | OUTPATIENT
Start: 2021-10-14 | End: 2021-10-19

## 2021-10-16 LAB — BACTERIA UR CULT: ABNORMAL

## 2021-10-17 ENCOUNTER — TELEPHONE (OUTPATIENT)
Dept: URGENT CARE | Facility: CLINIC | Age: 31
End: 2021-10-17

## 2022-01-04 ENCOUNTER — OFFICE VISIT (OUTPATIENT)
Dept: PSYCHIATRY | Facility: CLINIC | Age: 32
End: 2022-01-04
Payer: MEDICAID

## 2022-01-04 DIAGNOSIS — Z60.0 PHASE OF LIFE PROBLEM: ICD-10-CM

## 2022-01-04 DIAGNOSIS — Z65.8 INTERPERSONAL PROBLEM: ICD-10-CM

## 2022-01-04 DIAGNOSIS — F40.10 SOCIAL FEARS: ICD-10-CM

## 2022-01-04 DIAGNOSIS — F43.23 ADJUSTMENT DISORDER WITH MIXED ANXIETY AND DEPRESSED MOOD: Primary | ICD-10-CM

## 2022-01-04 DIAGNOSIS — Z91.89 AT HIGH RISK FOR ALTERED FAMILY DYNAMICS: ICD-10-CM

## 2022-01-04 DIAGNOSIS — F64.0 GENDER DYSPHORIA IN ADULT: ICD-10-CM

## 2022-01-04 PROCEDURE — 90834 PR PSYCHOTHERAPY W/PATIENT, 45 MIN: ICD-10-PCS | Mod: HP,HB,95, | Performed by: SOCIAL WORKER

## 2022-01-04 PROCEDURE — 1159F PR MEDICATION LIST DOCUMENTED IN MEDICAL RECORD: ICD-10-PCS | Mod: HP,HB,CPTII,95 | Performed by: SOCIAL WORKER

## 2022-01-04 PROCEDURE — 90834 PSYTX W PT 45 MINUTES: CPT | Mod: HP,HB,95, | Performed by: SOCIAL WORKER

## 2022-01-04 PROCEDURE — 1159F MED LIST DOCD IN RCRD: CPT | Mod: HP,HB,CPTII,95 | Performed by: SOCIAL WORKER

## 2022-01-04 SDOH — SOCIAL DETERMINANTS OF HEALTH (SDOH): PROBLEMS OF ADJUSTMENT TO LIFE-CYCLE TRANSITIONS: Z60.0

## 2022-01-05 NOTE — PROGRESS NOTES
Individual Psychotherapy (PhD/LCSW)    1/4/2022     Telehealth-Virtual Visit  The patient location is: in her car.   The chief complaint leading to consultation is: adjustment qferqe-kjbzpuanqkilu-vubmqy dysphoria-fly dynamics.     Visit type: audiovisual    Face to Face time with patient: 45 min  50- minutes of total time spent on the encounter, which includes face to face time and non-face to face time preparing to see the patient (eg, review of tests), Obtaining and/or reviewing separately obtained history, Documenting clinical information in the electronic or other health record, Independently interpreting results (not separately reported) and communicating results to the patient/family/caregiver, or Care coordination (not separately reported).   Each patient to whom he or she provides medical services by telemedicine is:  (1) informed of the relationship between the physician and patient and the respective role of any other health care provider with respect to management of the patient; and (2) notified that he or she may decline to receive medical services by telemedicine and may withdraw from such care at any time.    Site:  [facility]         Therapeutic Intervention: Met with patient.  Outpatient - Insight oriented psychotherapy 45 min - CPT code 35496    Chief complaint/reason for encounter: depression, anxiety, behavior, interpersonal and transitioning -adjustment-family dynamics.      Interval history and content of current session: Pt was last seen 2-months ago for session. Pt reports she continues to get settled in her home in [place]. Pt states she has taken on 2-roommates to help with her expenses since she is not working at the present time. Pt stated she did work briefly for [employer] however was let go because she could not read the screen quick enough to fill her orders. Pt reports she has decided to focus on school and now that she has roommates this will give her extra money. Pt  "reports she has registered for the spring semester and will be taking 9-hours to start. Pt continues to think about being a  on the college level. Pt plans to network at [employer] and explore opportunities for teaching assistants. Pt reports she had a nice holiday season and was able to come out to some of her family and had a supportive response from most of her relatives on her father's side who she had seen through  and [employer]. Pt also reports disclosing this info to her brother as well. Pt stated since she has formally come out to more of her family she has felt better and has been dressing as a female full time. Pt reports she has also joined the [place] group on [place] and hopes to connect with more trans people in her area which she has recently discovered there seems to be more there and in [facility] than she realized. Otherwise pt has remained at home has been very cautious because of the omicron variant. Pt reports her mother still has not fully accepted all the recent changes she has made however she is trying to not let it get her down and realizes her mother and brother may need more time to fully process what is going on with her.  o  Pt continues to report she is struggling with a lot of negative self talk and self doubt.  Pt continues to discuss struggling with how to find where she "fits in" in her life at the present time however is feeling slightly better about things overall. Pt's long term plan finish college, complete surgeries, and then move out of state.      Treatment plan:  · Target symptoms: gender dysphoria- transitioning-adjustment, gender dysphoria, social anxiety, family dynamics, phase of life, interpersonal relationships, depression, anxiety , adjustment, work stress  · Why chosen therapy is appropriate versus another modality: relevant to diagnosis, patient responds to this modality, evidence based practice  · Outcome monitoring methods: " self-report, observation  · Therapeutic intervention type: insight oriented psychotherapy, behavior modifying psychotherapy, supportive psychotherapy, interactive psychotherapy    Risk parameters:  Patient reports no suicidal ideation  Patient reports no homicidal ideation  Patient reports no self-injurious behavior  Patient reports no violent behavior    Verbal deficits: None    Patient's response to intervention:  The patient's response to intervention is accepting.    Progress toward goals and other mental status changes:  The patient's progress toward goals is progressing.    Diagnosis:     ICD-10-CM ICD-9-CM   1. Adjustment disorder with mixed anxiety and depressed mood  F43.23 309.28   2. Social fears  F40.10 300.23   3. Gender dysphoria in adult  F64.0 302.85   4. Interpersonal problem  Z65.8 V62.81   5. Phase of life problem  Z60.0 V62.89   6. At high risk for altered family dynamics  Z63.8 V61.8       Plan:  individual psychotherapy    Return to clinic: 2 weeks    Length of Service (minutes): 45

## 2022-01-28 ENCOUNTER — PATIENT MESSAGE (OUTPATIENT)
Dept: ENDOCRINOLOGY | Facility: CLINIC | Age: 32
End: 2022-01-28
Payer: MEDICAID

## 2022-02-07 ENCOUNTER — OFFICE VISIT (OUTPATIENT)
Dept: PSYCHIATRY | Facility: CLINIC | Age: 32
End: 2022-02-07
Payer: MEDICAID

## 2022-02-07 DIAGNOSIS — Z60.0 PHASE OF LIFE PROBLEM: ICD-10-CM

## 2022-02-07 DIAGNOSIS — F40.10 SOCIAL FEARS: ICD-10-CM

## 2022-02-07 DIAGNOSIS — F43.23 ADJUSTMENT DISORDER WITH MIXED ANXIETY AND DEPRESSED MOOD: Primary | ICD-10-CM

## 2022-02-07 DIAGNOSIS — F64.0 GENDER DYSPHORIA IN ADULT: ICD-10-CM

## 2022-02-07 DIAGNOSIS — Z65.8 INTERPERSONAL PROBLEM: ICD-10-CM

## 2022-02-07 DIAGNOSIS — Z91.89 AT HIGH RISK FOR ALTERED FAMILY DYNAMICS: ICD-10-CM

## 2022-02-07 PROCEDURE — 1159F MED LIST DOCD IN RCRD: CPT | Mod: AH,HB,CPTII,95 | Performed by: SOCIAL WORKER

## 2022-02-07 PROCEDURE — 1159F PR MEDICATION LIST DOCUMENTED IN MEDICAL RECORD: ICD-10-PCS | Mod: AH,HB,CPTII,95 | Performed by: SOCIAL WORKER

## 2022-02-07 PROCEDURE — 90834 PR PSYCHOTHERAPY W/PATIENT, 45 MIN: ICD-10-PCS | Mod: AH,HB,95, | Performed by: SOCIAL WORKER

## 2022-02-07 PROCEDURE — 90834 PSYTX W PT 45 MINUTES: CPT | Mod: AH,HB,95, | Performed by: SOCIAL WORKER

## 2022-02-07 SDOH — SOCIAL DETERMINANTS OF HEALTH (SDOH): PROBLEMS OF ADJUSTMENT TO LIFE-CYCLE TRANSITIONS: Z60.0

## 2022-02-08 NOTE — PROGRESS NOTES
"Individual Psychotherapy (PhD/LCSW)    2/7/2022     Telehealth-Virtual Visit  The patient location is: in her car.   The chief complaint leading to consultation is: adjustment buafzh-ttyinbrekqyto-dtdhhs dysphoria-fly dynamics.     Visit type: audiovisual    Face to Face time with patient: 45 min  50- minutes of total time spent on the encounter, which includes face to face time and non-face to face time preparing to see the patient (eg, review of tests), Obtaining and/or reviewing separately obtained history, Documenting clinical information in the electronic or other health record, Independently interpreting results (not separately reported) and communicating results to the patient/family/caregiver, or Care coordination (not separately reported).   Each patient to whom he or she provides medical services by telemedicine is:  (1) informed of the relationship between the physician and patient and the respective role of any other health care provider with respect to management of the patient; and (2) notified that he or she may decline to receive medical services by telemedicine and may withdraw from such care at any time.    Site:  [facility]         Therapeutic Intervention: Met with patient.  Outpatient - Insight oriented psychotherapy 45 min - CPT code 63603    Chief complaint/reason for encounter: depression, anxiety, behavior, interpersonal and transitioning -adjustment-family dynamics.      Interval history and content of current session: Pt was last seen 1-month ago for session. Pt reports she has started school 3 weeks ago. Pt shared how different her experiences going back to school as an older student and is registered and going to school as "Fuad". Pt discussed her experience of being at school in her preferred gender. Pt also discussed having to ask one of her roommates to move out due his inability to pay his rent. Pt reports she was able to evict him and now has only 1-roommate for now and this " "other roommate is working out well for her. Pt discussed feeling uneasy about her mother and brother and does not feel they are getting on board with her transition. Pt is not sure how to proceed since she came out to her brother and mother officially back in Dec. Pt stated she was with her family at a [facility] service in [place], [place] and has not spoken to them since the service.     Pt continues to think about being a  on the college level. Pt plans to network at [employer] and explore opportunities for teaching assistants.  Pt reports she has remained at home has been very cautious because of the omicron variant.  Pt continues to report she is struggling with a lot of negative self talk and self doubt.  Pt continues to discuss struggling with how to find where she "fits in" in her life at the present time however is feeling slightly better about things overall. Pt's long term plan finish college, complete surgeries, and then move out of state.      Treatment plan:  · Target symptoms: gender dysphoria- transitioning-adjustment, gender dysphoria, social anxiety, family dynamics, phase of life, interpersonal relationships, depression, anxiety , adjustment, work stress  · Why chosen therapy is appropriate versus another modality: relevant to diagnosis, patient responds to this modality, evidence based practice  · Outcome monitoring methods: self-report, observation  · Therapeutic intervention type: insight oriented psychotherapy, behavior modifying psychotherapy, supportive psychotherapy, interactive psychotherapy    Risk parameters:  Patient reports no suicidal ideation  Patient reports no homicidal ideation  Patient reports no self-injurious behavior  Patient reports no violent behavior    Verbal deficits: None    Patient's response to intervention:  The patient's response to intervention is accepting.    Progress toward goals and other mental status changes:  The patient's progress toward goals is " progressing.    Diagnosis:     ICD-10-CM ICD-9-CM   1. Adjustment disorder with mixed anxiety and depressed mood  F43.23 309.28   2. Social fears  F40.10 300.23   3. Gender dysphoria in adult  F64.0 302.85   4. Interpersonal problem  Z65.8 V62.81   5. Phase of life problem  Z60.0 V62.89   6. At high risk for altered family dynamics  Z63.8 V61.8       Plan:  individual psychotherapy    Return to clinic: 2 weeks    Length of Service (minutes): 45

## 2022-02-14 ENCOUNTER — LAB VISIT (OUTPATIENT)
Dept: LAB | Facility: HOSPITAL | Age: 32
End: 2022-02-14
Attending: INTERNAL MEDICINE
Payer: MEDICAID

## 2022-02-14 DIAGNOSIS — E03.8 SUBCLINICAL HYPOTHYROIDISM: ICD-10-CM

## 2022-02-14 DIAGNOSIS — E29.1 TESTICULAR FAILURE: ICD-10-CM

## 2022-02-14 DIAGNOSIS — E11.319 TYPE 2 DIABETES MELLITUS WITH RETINOPATHY, WITHOUT LONG-TERM CURRENT USE OF INSULIN, MACULAR EDEMA PRESENCE UNSPECIFIED, UNSPECIFIED LATERALITY, UNSPECIFIED RETINOPATHY SEVERITY: ICD-10-CM

## 2022-02-14 LAB
ALBUMIN SERPL BCP-MCNC: 3.7 G/DL (ref 3.5–5.2)
ALP SERPL-CCNC: 45 U/L (ref 55–135)
ALT SERPL W/O P-5'-P-CCNC: 16 U/L (ref 10–44)
ANION GAP SERPL CALC-SCNC: 9 MMOL/L (ref 8–16)
AST SERPL-CCNC: 18 U/L (ref 10–40)
BILIRUB SERPL-MCNC: 0.1 MG/DL (ref 0.1–1)
BUN SERPL-MCNC: 34 MG/DL (ref 6–20)
CALCIUM SERPL-MCNC: 9.9 MG/DL (ref 8.7–10.5)
CHLORIDE SERPL-SCNC: 108 MMOL/L (ref 95–110)
CO2 SERPL-SCNC: 22 MMOL/L (ref 23–29)
CREAT SERPL-MCNC: 1.7 MG/DL (ref 0.5–1.4)
EST. GFR  (AFRICAN AMERICAN): 46 ML/MIN/1.73 M^2
EST. GFR  (NON AFRICAN AMERICAN): 39.9 ML/MIN/1.73 M^2
ESTIMATED AVG GLUCOSE: 108 MG/DL (ref 68–131)
ESTRADIOL SERPL-MCNC: 186 PG/ML
GLUCOSE SERPL-MCNC: 94 MG/DL (ref 70–110)
HBA1C MFR BLD: 5.4 % (ref 4–5.6)
POTASSIUM SERPL-SCNC: 5.5 MMOL/L (ref 3.5–5.1)
PROT SERPL-MCNC: 7.5 G/DL (ref 6–8.4)
SODIUM SERPL-SCNC: 139 MMOL/L (ref 136–145)
T4 FREE SERPL-MCNC: 0.84 NG/DL (ref 0.71–1.51)
TESTOST SERPL-MCNC: 36 NG/DL (ref 5–73)
TSH SERPL DL<=0.005 MIU/L-ACNC: 6.54 UIU/ML (ref 0.4–4)

## 2022-02-14 PROCEDURE — 82670 ASSAY OF TOTAL ESTRADIOL: CPT | Performed by: INTERNAL MEDICINE

## 2022-02-14 PROCEDURE — 83036 HEMOGLOBIN GLYCOSYLATED A1C: CPT | Performed by: INTERNAL MEDICINE

## 2022-02-14 PROCEDURE — 84439 ASSAY OF FREE THYROXINE: CPT | Performed by: INTERNAL MEDICINE

## 2022-02-14 PROCEDURE — 84443 ASSAY THYROID STIM HORMONE: CPT | Performed by: INTERNAL MEDICINE

## 2022-02-14 PROCEDURE — 84403 ASSAY OF TOTAL TESTOSTERONE: CPT | Performed by: INTERNAL MEDICINE

## 2022-02-14 PROCEDURE — 80053 COMPREHEN METABOLIC PANEL: CPT | Performed by: INTERNAL MEDICINE

## 2022-02-14 PROCEDURE — 36415 COLL VENOUS BLD VENIPUNCTURE: CPT | Mod: PO | Performed by: INTERNAL MEDICINE

## 2022-02-18 ENCOUNTER — OFFICE VISIT (OUTPATIENT)
Dept: ENDOCRINOLOGY | Facility: CLINIC | Age: 32
End: 2022-02-18
Attending: INTERNAL MEDICINE
Payer: MEDICAID

## 2022-02-18 DIAGNOSIS — E78.2 MIXED DYSLIPIDEMIA: ICD-10-CM

## 2022-02-18 DIAGNOSIS — E11.319 TYPE 2 DIABETES MELLITUS WITH RETINOPATHY, WITHOUT LONG-TERM CURRENT USE OF INSULIN, MACULAR EDEMA PRESENCE UNSPECIFIED, UNSPECIFIED LATERALITY, UNSPECIFIED RETINOPATHY SEVERITY: ICD-10-CM

## 2022-02-18 DIAGNOSIS — R79.89 ABNORMAL BLOOD CREATININE LEVEL: Primary | ICD-10-CM

## 2022-02-18 DIAGNOSIS — E29.1 TESTICULAR FAILURE: ICD-10-CM

## 2022-02-18 DIAGNOSIS — E03.8 SUBCLINICAL HYPOTHYROIDISM: ICD-10-CM

## 2022-02-18 PROCEDURE — 1159F MED LIST DOCD IN RCRD: CPT | Mod: CPTII,95,, | Performed by: INTERNAL MEDICINE

## 2022-02-18 PROCEDURE — 1160F PR REVIEW ALL MEDS BY PRESCRIBER/CLIN PHARMACIST DOCUMENTED: ICD-10-PCS | Mod: CPTII,95,, | Performed by: INTERNAL MEDICINE

## 2022-02-18 PROCEDURE — 99214 OFFICE O/P EST MOD 30 MIN: CPT | Mod: 95,KX,, | Performed by: INTERNAL MEDICINE

## 2022-02-18 PROCEDURE — 1159F PR MEDICATION LIST DOCUMENTED IN MEDICAL RECORD: ICD-10-PCS | Mod: CPTII,95,, | Performed by: INTERNAL MEDICINE

## 2022-02-18 PROCEDURE — 3044F PR MOST RECENT HEMOGLOBIN A1C LEVEL <7.0%: ICD-10-PCS | Mod: CPTII,95,, | Performed by: INTERNAL MEDICINE

## 2022-02-18 PROCEDURE — 1160F RVW MEDS BY RX/DR IN RCRD: CPT | Mod: CPTII,95,, | Performed by: INTERNAL MEDICINE

## 2022-02-18 PROCEDURE — 99214 PR OFFICE/OUTPT VISIT, EST, LEVL IV, 30-39 MIN: ICD-10-PCS | Mod: 95,KX,, | Performed by: INTERNAL MEDICINE

## 2022-02-18 PROCEDURE — 3044F HG A1C LEVEL LT 7.0%: CPT | Mod: CPTII,95,, | Performed by: INTERNAL MEDICINE

## 2022-02-18 RX ORDER — ESTRADIOL 2 MG/1
3 TABLET ORAL 2 TIMES DAILY
Qty: 270 TABLET | Refills: 3 | Status: SHIPPED | OUTPATIENT
Start: 2022-02-18 | End: 2022-06-17 | Stop reason: SDUPTHER

## 2022-02-18 RX ORDER — LEVOTHYROXINE SODIUM 50 UG/1
50 TABLET ORAL
Qty: 90 TABLET | Refills: 3 | Status: SHIPPED | OUTPATIENT
Start: 2022-02-18 | End: 2022-06-17 | Stop reason: SDUPTHER

## 2022-02-18 RX ORDER — FINASTERIDE 5 MG/1
5 TABLET, FILM COATED ORAL DAILY
Qty: 90 TABLET | Refills: 3 | Status: SHIPPED | OUTPATIENT
Start: 2022-02-18 | End: 2022-04-05

## 2022-02-18 RX ORDER — PROGESTERONE 100 MG/1
100 CAPSULE ORAL NIGHTLY
Qty: 90 CAPSULE | Refills: 2 | Status: SHIPPED | OUTPATIENT
Start: 2022-02-18 | End: 2022-06-17 | Stop reason: SDUPTHER

## 2022-02-18 NOTE — ASSESSMENT & PLAN NOTE
Reviewed goals of therapy   Return to clinic in 6 months will likely stop progesterone at that time         Healthy lifestyle stressed  Discussed increased risk of dvt   Avoid prolonged immobility  D/c ert prior to any procedures

## 2022-02-18 NOTE — ASSESSMENT & PLAN NOTE
Ok to stay off aldactone    discussed stopping finasteride but she prefers to stay on it  If ert stopped would need to address bone health     erections are not important to her

## 2022-02-18 NOTE — PROGRESS NOTES
Subjective:      Patient ID: Miquel Salcedo is a 31 y.o. adult.    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      She is doing well  No missed doses   + breast growth               In school   [employer]   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       Single - did date for a short time   identifies Pansexual   Mom is aware           Also has T2DM  Diagnosed:  Age 14   - does not recall genetic testing   C-peptide and insulin levels were normal with an elevated glucose   Complications:  DR Eye exam 2017 proliferative  DR OD  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- seeing the retina specialist    No numbness of tingling of her feet      Regimen: none   Was on Amaryl 4 qam  actos 30  insulin in the past          Was On a statin for dyslipidemia  - off      She also was told she has hypothyroidism   TSH was 9 in the past      Has not been on thyroid meds for years    she is willing to start now  Dry skin       Regarding pituitary:  Has seen NS - Dr Haynes   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  BP Readings from Last 3 Encounters:   10/14/21 136/78   02/02/21 125/83   01/26/21 125/73     Wt Readings from Last 1 Encounters:   10/14/21  1142 45.4 kg (100 lb)     There is no height or weight on file to calculate BMI.      Physical Exam  Constitutional:       Appearance: Normal appearance.   Psychiatric:         Mood and Affect: Mood normal.         Behavior: Behavior normal.         Thought Content: Thought content normal.         Judgment: Judgment normal.                Lab Review:   Lab Results   Component Value Date    HGBA1C 5.4 02/14/2022     Lab Results   Component Value Date    CHOL 174 02/02/2021    HDL 71 02/02/2021    LDLCALC 81.6 02/02/2021    TRIG 107 02/02/2021    CHOLHDL 40.8 02/02/2021     Lab Results   Component Value Date     02/14/2022    K 5.5 (H) 02/14/2022     02/14/2022    CO2 22 (L) 02/14/2022    GLU 94 02/14/2022    BUN 34 (H) 02/14/2022    CREATININE 1.7 (H) 02/14/2022    CALCIUM 9.9 02/14/2022    PROT 7.5 02/14/2022    ALBUMIN 3.7 02/14/2022    BILITOT 0.1 02/14/2022    ALKPHOS 45 (L) 02/14/2022    AST 18 02/14/2022    ALT 16 02/14/2022    ANIONGAP 9 02/14/2022    ESTGFRAFRICA 46.0 (A) 02/14/2022    EGFRNONAA 39.9 (A) 02/14/2022    TSH 6.543 (H) 02/14/2022     Vit D, 25-Hydroxy   Date Value Ref Range Status   02/02/2021 31 30 - 96 ng/mL Final     Comment:     Vitamin D deficiency.........<10 ng/mL                              Vitamin D insufficiency......10-29 ng/mL       Vitamin D sufficiency........> or equal to 30 ng/mL  Vitamin D toxicity............>100 ng/mL         Assessment and Plan     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  Start 50 mcg a day.   Recheck TSH and free t4  in 2 months.    Mixed dyslipidemia  Follow labs     Testicular failure  Ok to stay off aldactone    discussed stopping finasteride but she prefers to stay on it  If ert stopped would need to address bone health     erections are not important to her          Elevated cr- refer to renal       The patient location is: home  The chief complaint leading to consultation is: gender    Visit type: audiovisual    Face to Face time with patient: 20 minutes of total time spent on the encounter, which includes face to face time and non-face to face time preparing to see the patient (eg, review of tests), Obtaining and/or reviewing separately obtained history, Documenting clinical information in the electronic or other health record, Independently interpreting results (not separately reported) and communicating results to the patient/family/caregiver, or Care coordination (not separately reported).         Each patient to whom he or she provides medical services by telemedicine is:  (1) informed of the relationship between the physician and patient and the respective role of any other health care provider with respect to management of the patient; and (2) notified that he or she may decline to receive medical services by telemedicine and may withdraw from such care at any time.

## 2022-02-18 NOTE — PATIENT INSTRUCTIONS
Thank you for completing a virtual visit with me!     Per our conversation   1. Please start levothyroxine 50 mcg a day.  It is best to take it 1st thing in the morning with a few sips of water.  2. I will refer you to Nephrology to evaluate the abnormal kidney function.    3. We will recheck the thyroid labs in 2 months.    We will plan a telemedicine or an in-clinic visit in 6 months with labs prior to that appointment.    My staff will contact you to schedule the above.     Please let me know if you have any other questions.    Thank you,  Shruthi Juarez MD

## 2022-04-05 RX ORDER — FINASTERIDE 5 MG/1
TABLET, FILM COATED ORAL
Qty: 90 TABLET | Refills: 3 | Status: SHIPPED | OUTPATIENT
Start: 2022-04-05 | End: 2023-04-05

## 2022-04-29 ENCOUNTER — PATIENT MESSAGE (OUTPATIENT)
Dept: PSYCHIATRY | Facility: CLINIC | Age: 32
End: 2022-04-29
Payer: MEDICAID

## 2022-04-29 ENCOUNTER — PATIENT MESSAGE (OUTPATIENT)
Dept: NEUROSURGERY | Facility: CLINIC | Age: 32
End: 2022-04-29
Payer: MEDICAID

## 2022-04-29 ENCOUNTER — TELEPHONE (OUTPATIENT)
Dept: PSYCHIATRY | Facility: CLINIC | Age: 32
End: 2022-04-29
Payer: MEDICAID

## 2022-04-29 NOTE — TELEPHONE ENCOUNTER
Attempt to contact the patient to inform 5/26 appt has to be r/s provider won't be in. No answer, portal message sent

## 2022-05-02 ENCOUNTER — TELEPHONE (OUTPATIENT)
Dept: PSYCHIATRY | Facility: CLINIC | Age: 32
End: 2022-05-02
Payer: MEDICAID

## 2022-06-15 ENCOUNTER — LAB VISIT (OUTPATIENT)
Dept: LAB | Facility: HOSPITAL | Age: 32
End: 2022-06-15
Attending: INTERNAL MEDICINE
Payer: MEDICAID

## 2022-06-15 ENCOUNTER — PATIENT MESSAGE (OUTPATIENT)
Dept: ENDOCRINOLOGY | Facility: CLINIC | Age: 32
End: 2022-06-15
Payer: MEDICAID

## 2022-06-15 DIAGNOSIS — E03.8 SUBCLINICAL HYPOTHYROIDISM: ICD-10-CM

## 2022-06-15 LAB
ALBUMIN SERPL BCP-MCNC: 3.6 G/DL (ref 3.5–5.2)
ALP SERPL-CCNC: 41 U/L (ref 55–135)
ALT SERPL W/O P-5'-P-CCNC: 19 U/L (ref 10–44)
ANION GAP SERPL CALC-SCNC: 13 MMOL/L (ref 8–16)
AST SERPL-CCNC: 22 U/L (ref 10–40)
BILIRUB SERPL-MCNC: 0.1 MG/DL (ref 0.1–1)
BUN SERPL-MCNC: 32 MG/DL (ref 6–20)
CALCIUM SERPL-MCNC: 9.6 MG/DL (ref 8.7–10.5)
CHLORIDE SERPL-SCNC: 107 MMOL/L (ref 95–110)
CO2 SERPL-SCNC: 19 MMOL/L (ref 23–29)
CREAT SERPL-MCNC: 1.7 MG/DL (ref 0.5–1.4)
EST. GFR  (AFRICAN AMERICAN): 46 ML/MIN/1.73 M^2
EST. GFR  (NON AFRICAN AMERICAN): 39.9 ML/MIN/1.73 M^2
ESTIMATED AVG GLUCOSE: 105 MG/DL (ref 68–131)
ESTRADIOL SERPL-MCNC: 532 PG/ML
GLUCOSE SERPL-MCNC: 84 MG/DL (ref 70–110)
HBA1C MFR BLD: 5.3 % (ref 4–5.6)
POTASSIUM SERPL-SCNC: 5 MMOL/L (ref 3.5–5.1)
PROT SERPL-MCNC: 6.9 G/DL (ref 6–8.4)
SODIUM SERPL-SCNC: 139 MMOL/L (ref 136–145)
T4 FREE SERPL-MCNC: 0.94 NG/DL (ref 0.71–1.51)
TSH SERPL DL<=0.005 MIU/L-ACNC: 3.29 UIU/ML (ref 0.4–4)
TSH SERPL DL<=0.005 MIU/L-ACNC: 3.29 UIU/ML (ref 0.4–4)

## 2022-06-15 PROCEDURE — 84439 ASSAY OF FREE THYROXINE: CPT | Performed by: INTERNAL MEDICINE

## 2022-06-15 PROCEDURE — 80053 COMPREHEN METABOLIC PANEL: CPT | Performed by: INTERNAL MEDICINE

## 2022-06-15 PROCEDURE — 84443 ASSAY THYROID STIM HORMONE: CPT | Performed by: INTERNAL MEDICINE

## 2022-06-15 PROCEDURE — 82670 ASSAY OF TOTAL ESTRADIOL: CPT | Performed by: INTERNAL MEDICINE

## 2022-06-15 PROCEDURE — 83036 HEMOGLOBIN GLYCOSYLATED A1C: CPT | Performed by: INTERNAL MEDICINE

## 2022-06-15 PROCEDURE — 36415 COLL VENOUS BLD VENIPUNCTURE: CPT | Mod: PO | Performed by: INTERNAL MEDICINE

## 2022-06-17 ENCOUNTER — OFFICE VISIT (OUTPATIENT)
Dept: ENDOCRINOLOGY | Facility: CLINIC | Age: 32
End: 2022-06-17
Attending: INTERNAL MEDICINE
Payer: MEDICAID

## 2022-06-17 DIAGNOSIS — R79.89 ELEVATED SERUM CREATININE: ICD-10-CM

## 2022-06-17 DIAGNOSIS — E78.2 MIXED DYSLIPIDEMIA: ICD-10-CM

## 2022-06-17 DIAGNOSIS — E11.319 TYPE 2 DIABETES MELLITUS WITH RETINOPATHY, WITHOUT LONG-TERM CURRENT USE OF INSULIN, MACULAR EDEMA PRESENCE UNSPECIFIED, UNSPECIFIED LATERALITY, UNSPECIFIED RETINOPATHY SEVERITY: ICD-10-CM

## 2022-06-17 DIAGNOSIS — E29.1 TESTICULAR FAILURE: ICD-10-CM

## 2022-06-17 DIAGNOSIS — E03.8 SUBCLINICAL HYPOTHYROIDISM: ICD-10-CM

## 2022-06-17 DIAGNOSIS — Z85.841 H/O ASTROCYTOMA: ICD-10-CM

## 2022-06-17 PROCEDURE — 3044F HG A1C LEVEL LT 7.0%: CPT | Mod: CPTII,95,, | Performed by: INTERNAL MEDICINE

## 2022-06-17 PROCEDURE — 1160F PR REVIEW ALL MEDS BY PRESCRIBER/CLIN PHARMACIST DOCUMENTED: ICD-10-PCS | Mod: CPTII,95,, | Performed by: INTERNAL MEDICINE

## 2022-06-17 PROCEDURE — 99214 PR OFFICE/OUTPT VISIT, EST, LEVL IV, 30-39 MIN: ICD-10-PCS | Mod: 95,KX,, | Performed by: INTERNAL MEDICINE

## 2022-06-17 PROCEDURE — 3044F PR MOST RECENT HEMOGLOBIN A1C LEVEL <7.0%: ICD-10-PCS | Mod: CPTII,95,, | Performed by: INTERNAL MEDICINE

## 2022-06-17 PROCEDURE — 99214 OFFICE O/P EST MOD 30 MIN: CPT | Mod: 95,KX,, | Performed by: INTERNAL MEDICINE

## 2022-06-17 PROCEDURE — 1159F MED LIST DOCD IN RCRD: CPT | Mod: CPTII,95,, | Performed by: INTERNAL MEDICINE

## 2022-06-17 PROCEDURE — 1159F PR MEDICATION LIST DOCUMENTED IN MEDICAL RECORD: ICD-10-PCS | Mod: CPTII,95,, | Performed by: INTERNAL MEDICINE

## 2022-06-17 PROCEDURE — 1160F RVW MEDS BY RX/DR IN RCRD: CPT | Mod: CPTII,95,, | Performed by: INTERNAL MEDICINE

## 2022-06-17 RX ORDER — PROGESTERONE 100 MG/1
100 CAPSULE ORAL NIGHTLY
Qty: 90 CAPSULE | Refills: 2 | Status: SHIPPED | OUTPATIENT
Start: 2022-06-17 | End: 2022-10-21

## 2022-06-17 RX ORDER — LEVOTHYROXINE SODIUM 50 UG/1
50 TABLET ORAL
Qty: 90 TABLET | Refills: 3 | Status: SHIPPED | OUTPATIENT
Start: 2022-06-17 | End: 2023-06-19 | Stop reason: SDUPTHER

## 2022-06-17 RX ORDER — ESTRADIOL 2 MG/1
3 TABLET ORAL 2 TIMES DAILY
Qty: 270 TABLET | Refills: 3 | Status: SHIPPED | OUTPATIENT
Start: 2022-06-17 | End: 2023-06-14 | Stop reason: SDUPTHER

## 2022-06-17 NOTE — ASSESSMENT & PLAN NOTE
Reviewed goals of therapy   Return to clinic in 4-6 months will likely stop progesterone at that time         Healthy lifestyle stressed  Discussed increased risk of dvt   Avoid prolonged immobility  D/c ert prior to any procedures

## 2022-06-17 NOTE — PROGRESS NOTES
Subjective:      Patient ID: Miquel Salcedo is a 31 y.o. adult.    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      She is doing well  No missed doses    + breast growth               In school   [employer]   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       Single - has a date this we can 1 of my other patients   identifies Pansexual   Mom is aware           Also has T2DM  Diagnosed:  Age 14   - does not recall genetic testing   C-peptide and insulin levels were normal with an elevated glucose   Complications:  DR Eye exam 2017 proliferative  DR OD  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- seeing the retina specialist    No numbness of tingling of her feet      Regimen: none   Was on Amaryl 4 qam  actos 30  insulin in the past          Was On a statin for dyslipidemia  - off      She also was told she has hypothyroidism   We restarted levothyroxine in February of 2022  her current dose is 50 mcg a day     Regarding pituitary:  Has seen NS - Dr Haynes   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    ROS:   As above    Objective:     There were no vitals taken for this visit.  BP Readings from Last 3 Encounters:   10/14/21 136/78   02/02/21  125/83   01/26/21 125/73     Wt Readings from Last 1 Encounters:   10/14/21 1142 45.4 kg (100 lb)     There is no height or weight on file to calculate BMI.      Physical Exam  Constitutional:       Appearance: Normal appearance.   Psychiatric:         Mood and Affect: Mood normal.         Behavior: Behavior normal.         Thought Content: Thought content normal.         Judgment: Judgment normal.                Lab Review:   Lab Results   Component Value Date    HGBA1C 5.3 06/15/2022     Lab Results   Component Value Date    CHOL 174 02/02/2021    HDL 71 02/02/2021    LDLCALC 81.6 02/02/2021    TRIG 107 02/02/2021    CHOLHDL 40.8 02/02/2021     Lab Results   Component Value Date     06/15/2022    K 5.0 06/15/2022     06/15/2022    CO2 19 (L) 06/15/2022    GLU 84 06/15/2022    BUN 32 (H) 06/15/2022    CREATININE 1.7 (H) 06/15/2022    CALCIUM 9.6 06/15/2022    PROT 6.9 06/15/2022    ALBUMIN 3.6 06/15/2022    BILITOT 0.1 06/15/2022    ALKPHOS 41 (L) 06/15/2022    AST 22 06/15/2022    ALT 19 06/15/2022    ANIONGAP 13 06/15/2022    ESTGFRAFRICA 46.0 (A) 06/15/2022    EGFRNONAA 39.9 (A) 06/15/2022    TSH 3.292 06/15/2022    TSH 3.292 06/15/2022     Vit D, 25-Hydroxy   Date Value Ref Range Status   02/02/2021 31 30 - 96 ng/mL Final     Comment:     Vitamin D deficiency.........<10 ng/mL                              Vitamin D insufficiency......10-29 ng/mL       Vitamin D sufficiency........> or equal to 30 ng/mL  Vitamin D toxicity............>100 ng/mL         Assessment and Plan     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    No changes.    Mixed dyslipidemia  Follow labs     Testicular failure  Ok to stay off aldactone    discussed stopping finasteride but she prefers to stay on it  If ert stopped would need to address bone health     erections are not important to her        H/O astrocytoma    F/u  NS      Elevated serum creatinine  Maintain hydration and follow-up with Nephrology             The patient location is: home  The chief complaint leading to consultation is: gender    Visit type: audiovisual    Face to Face time with patient: 20 minutes of total time spent on the encounter, which includes face to face time and non-face to face time preparing to see the patient (eg, review of tests), Obtaining and/or reviewing separately obtained history, Documenting clinical information in the electronic or other health record, Independently interpreting results (not separately reported) and communicating results to the patient/family/caregiver, or Care coordination (not separately reported).         Each patient to whom he or she provides medical services by telemedicine is:  (1) informed of the relationship between the physician and patient and the respective role of any other health care provider with respect to management of the patient; and (2) notified that he or she may decline to receive medical services by telemedicine and may withdraw from such care at any time.

## 2022-06-17 NOTE — PATIENT INSTRUCTIONS
Thank you for completing a virtual visit with me!     Per our conversation:  Make sure you stay hydrated.  Please keep your schedule a visit with nephrology  3.   We will plan a telemedicine or an in-clinic visit in 4 months with labs prior to that appointment.  4.   Remember to get the labs drawn prior to taking your estrogen    My staff will contact you to schedule the above.     Please let me know if you have any other questions.    Thank you,  Shruthi Juarez MD

## 2022-06-20 ENCOUNTER — HOSPITAL ENCOUNTER (OUTPATIENT)
Dept: RADIOLOGY | Facility: HOSPITAL | Age: 32
Discharge: HOME | End: 2022-06-20
Attending: NEUROLOGICAL SURGERY
Payer: MEDICAID

## 2022-06-20 DIAGNOSIS — Z85.841 H/O ASTROCYTOMA: ICD-10-CM

## 2022-06-20 PROCEDURE — 72020 X-RAY EXAM OF SPINE 1 VIEW: CPT | Mod: 26,,, | Performed by: RADIOLOGY

## 2022-06-20 PROCEDURE — 70250 XR SHUNT SERIES: ICD-10-PCS | Mod: 26,,, | Performed by: RADIOLOGY

## 2022-06-20 PROCEDURE — 74018 RADEX ABDOMEN 1 VIEW: CPT | Mod: 26,,, | Performed by: RADIOLOGY

## 2022-06-20 PROCEDURE — 71045 X-RAY EXAM CHEST 1 VIEW: CPT | Mod: TC,PO

## 2022-06-20 PROCEDURE — 71045 X-RAY EXAM CHEST 1 VIEW: CPT | Mod: 26,,, | Performed by: RADIOLOGY

## 2022-06-20 PROCEDURE — 70250 X-RAY EXAM OF SKULL: CPT | Mod: 26,,, | Performed by: RADIOLOGY

## 2022-06-20 PROCEDURE — 74018 XR SHUNT SERIES: ICD-10-PCS | Mod: 26,,, | Performed by: RADIOLOGY

## 2022-06-20 PROCEDURE — 72020 XR SHUNT SERIES: ICD-10-PCS | Mod: 26,,, | Performed by: RADIOLOGY

## 2022-06-20 PROCEDURE — 71045 XR SHUNT SERIES: ICD-10-PCS | Mod: 26,,, | Performed by: RADIOLOGY

## 2022-07-27 ENCOUNTER — PATIENT MESSAGE (OUTPATIENT)
Dept: PSYCHIATRY | Facility: CLINIC | Age: 32
End: 2022-07-27
Payer: MEDICAID

## 2022-07-27 ENCOUNTER — OFFICE VISIT (OUTPATIENT)
Dept: PSYCHIATRY | Facility: CLINIC | Age: 32
End: 2022-07-27
Payer: MEDICAID

## 2022-07-27 ENCOUNTER — PATIENT MESSAGE (OUTPATIENT)
Dept: NEUROSURGERY | Facility: CLINIC | Age: 32
End: 2022-07-27
Payer: MEDICAID

## 2022-07-27 DIAGNOSIS — F40.10 SOCIAL FEARS: ICD-10-CM

## 2022-07-27 DIAGNOSIS — F43.23 ADJUSTMENT DISORDER WITH MIXED ANXIETY AND DEPRESSED MOOD: Primary | ICD-10-CM

## 2022-07-27 DIAGNOSIS — F90.1 ATTENTION DEFICIT HYPERACTIVITY DISORDER (ADHD), PREDOMINANTLY HYPERACTIVE TYPE: ICD-10-CM

## 2022-07-27 DIAGNOSIS — R41.840 DISTURBED CONCENTRATION: ICD-10-CM

## 2022-07-27 DIAGNOSIS — F64.0 GENDER DYSPHORIA IN ADULT: ICD-10-CM

## 2022-07-27 PROCEDURE — 90834 PSYTX W PT 45 MINUTES: CPT | Mod: AH,HB,95, | Performed by: SOCIAL WORKER

## 2022-07-27 PROCEDURE — 90834 PR PSYCHOTHERAPY W/PATIENT, 45 MIN: ICD-10-PCS | Mod: AH,HB,95, | Performed by: SOCIAL WORKER

## 2022-07-27 PROCEDURE — 3044F PR MOST RECENT HEMOGLOBIN A1C LEVEL <7.0%: ICD-10-PCS | Mod: AH,HB,CPTII,95 | Performed by: SOCIAL WORKER

## 2022-07-27 PROCEDURE — 3044F HG A1C LEVEL LT 7.0%: CPT | Mod: AH,HB,CPTII,95 | Performed by: SOCIAL WORKER

## 2022-08-01 PROBLEM — F90.1 ATTENTION DEFICIT HYPERACTIVITY DISORDER (ADHD), PREDOMINANTLY HYPERACTIVE TYPE: Status: ACTIVE | Noted: 2022-08-01

## 2022-08-01 PROBLEM — R41.840 DISTURBED CONCENTRATION: Status: ACTIVE | Noted: 2022-08-01

## 2022-08-01 NOTE — PROGRESS NOTES
Individual Psychotherapy (PhD/LCSW)    Preferred Name: Fuad   7/27/2022     Telehealth-Virtual Visit  The patient location is: in home  ([place], [place])  The chief complaint leading to consultation is: adjustment iufaib-pyyfyytvrbcqb-mxxfxl dysphoria-fly dynamics.     Visit type: audiovisual    Face to Face time with patient: 45 min  50- minutes of total time spent on the encounter, which includes face to face time and non-face to face time preparing to see the patient (eg, review of tests), Obtaining and/or reviewing separately obtained history, Documenting clinical information in the electronic or other health record, Independently interpreting results (not separately reported) and communicating results to the patient/family/caregiver, or Care coordination (not separately reported).   Each patient to whom he or she provides medical services by telemedicine is:  (1) informed of the relationship between the physician and patient and the respective role of any other health care provider with respect to management of the patient; and (2) notified that he or she may decline to receive medical services by telemedicine and may withdraw from such care at any time.    Site:  [facility]         Therapeutic Intervention: Met with patient.  Outpatient - Insight oriented psychotherapy 45 min - CPT code 63545    Chief complaint/reason for encounter: depression, anxiety, behavior, interpersonal and transitioning -adjustment-family dynamics.      Interval history and content of current session: Pt was last seen 3-months ago for session. Pt reports she was in school but was having a difficult time getting it together- with focus, getting organized, balancing work, and dealing with a few financial stressors. Pt discussed a lawsuit that is going on with her older brother regarding a trust she shares with her 2-brothers. Pt also discussed dealing with tax issues and how she is working to resolve them. Pt reports she continues to  "have a distant relationship with her immediate family (mother & younger brother).  Pt reports she planned to attend a  for her grandfather however mother wanted pt to attend to be a pall bearer along with her cousins. Pt states it is clear his mother and brother are not open to moving forward and accepting her transition so pt did feel comfortable attending the .  Pt continued to share how different her experiences going back to school as an older student and is registered and going to school as "Fuad". Pt discussed her experience of being at school in her preferred gender. Pt reports that she recently went on a date and it went well.     Pt continues to think about being a  on the college level. Pt plans to network at [employer] and explore opportunities for teaching assistants.   Pt continues to report she is struggling with a lot of negative self talk and self doubt.  Pt continues to discuss struggling with how to find where she "fits in" in her life at the present time however is feeling slightly better about things overall. Pt's long term plan finish college, complete surgeries, and then move out of state.  Pt requested to be seen for medication mgmt in our department and wants to address attention dysregulation symptoms.  Pt discussed how she struggles with poor focus, concentration, and memory recall. Pt states she thinks her symptoms are getting worse.  Clinician encouraged pt to practice new behaviors using behavioral rehearsal-set up organization.     Anxiety:  Intense and repeated verbalizations of anxiety, worry, or fear.   Unusually high level of motor tension such as restlessness, tiredness, shakiness, or muscle tightness.   Hypervigilance on edge, concentration difficulty   Specific fear (rejection from family, friends, & community around her transition) that has generalized to cover a wide area and significantly interferes with daily life and " functioning.    Intervention:  Verbalize issues that trigger anxiety and develop healthy alternatives to cope with stressors.  Identify cognitive distortions of belief system that lead to anxious feelings particularly with her family dynamics and her transitions.    Develop ability to change negative self-messages to positive, self affirming messages.   Identify life situations that are stressful and develop a plan that uses problem solving skills to deal with them.   Use relaxation & breathing techniques when feeling overwhelmed.         ADHD:Pattern observed across settings affecting her moderately with impairment  Carelessness and inattention  Cannot sustain attention and struggles with listening especially if it is not something of interest.   Does not follow through in a timely manner with tasks or poor follow through with instructions   Difficulty with organization and dislikes activities that require concentration and sustained effort.   Loses things; easily distracted forgetful.     Intervention:  Refer for medication mgmt  Behavior modification: clear expectations and limits  Reduce tasks into clear-smaller steps-make lists set reasonable deadlines.   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)  Consider ADHD         Treatment plan:  · Target symptoms: gender dysphoria- transitioning-adjustment, gender dysphoria, social anxiety, family dynamics, phase of life, interpersonal relationships, depression, anxiety , adjustment, work stress, ADHD  · Why chosen therapy is appropriate versus another modality: relevant to diagnosis, patient responds to this modality, evidence based practice  · Outcome monitoring methods: self-report, observation  · Therapeutic intervention type: insight oriented psychotherapy, behavior modifying psychotherapy, supportive psychotherapy, interactive psychotherapy    Risk parameters:  Patient reports no suicidal ideation  Patient reports no homicidal  ideation  Patient reports no self-injurious behavior  Patient reports no violent behavior    Verbal deficits: None    Patient's response to intervention:  The patient's response to intervention is accepting.    Progress toward goals and other mental status changes:  The patient's progress toward goals is progressing.    Diagnosis:     ICD-10-CM ICD-9-CM   1. Adjustment disorder with mixed anxiety and depressed mood  F43.23 309.28   2. Gender dysphoria in adult  F64.0 302.85   3. Social fears  F40.10 300.23   4. Attention deficit hyperactivity disorder (ADHD), predominantly hyperactive type  F90.1 314.01   5. Disturbed concentration  R41.840 799.51       Plan:  individual psychotherapy    Return to clinic: 2 weeks    Length of Service (minutes): 45

## 2022-08-11 ENCOUNTER — PATIENT MESSAGE (OUTPATIENT)
Dept: NEPHROLOGY | Facility: CLINIC | Age: 32
End: 2022-08-11
Payer: MEDICAID

## 2022-08-11 DIAGNOSIS — E78.2 MIXED DYSLIPIDEMIA: Primary | ICD-10-CM

## 2022-08-11 DIAGNOSIS — R79.89 ELEVATED SERUM CREATININE: ICD-10-CM

## 2022-08-11 DIAGNOSIS — E11.311 TYPE 2 DIABETES MELLITUS WITH RETINOPATHY AND MACULAR EDEMA, WITHOUT LONG-TERM CURRENT USE OF INSULIN, UNSPECIFIED LATERALITY, UNSPECIFIED RETINOPATHY SEVERITY: ICD-10-CM

## 2022-08-12 ENCOUNTER — LAB VISIT (OUTPATIENT)
Dept: LAB | Facility: HOSPITAL | Age: 32
End: 2022-08-12
Attending: INTERNAL MEDICINE
Payer: MEDICAID

## 2022-08-12 DIAGNOSIS — E11.311 TYPE 2 DIABETES MELLITUS WITH RETINOPATHY AND MACULAR EDEMA, WITHOUT LONG-TERM CURRENT USE OF INSULIN, UNSPECIFIED LATERALITY, UNSPECIFIED RETINOPATHY SEVERITY: ICD-10-CM

## 2022-08-12 DIAGNOSIS — R79.89 ELEVATED SERUM CREATININE: ICD-10-CM

## 2022-08-12 DIAGNOSIS — E78.2 MIXED DYSLIPIDEMIA: ICD-10-CM

## 2022-08-12 LAB
ALBUMIN SERPL BCP-MCNC: 3.9 G/DL (ref 3.5–5.2)
ALBUMIN SERPL BCP-MCNC: 3.9 G/DL (ref 3.5–5.2)
ALP SERPL-CCNC: 36 U/L (ref 55–135)
ALP SERPL-CCNC: 36 U/L (ref 55–135)
ALT SERPL W/O P-5'-P-CCNC: 13 U/L (ref 10–44)
ALT SERPL W/O P-5'-P-CCNC: 13 U/L (ref 10–44)
ANION GAP SERPL CALC-SCNC: 11 MMOL/L (ref 8–16)
ANION GAP SERPL CALC-SCNC: 11 MMOL/L (ref 8–16)
AST SERPL-CCNC: 14 U/L (ref 10–40)
AST SERPL-CCNC: 14 U/L (ref 10–40)
BASOPHILS # BLD AUTO: 0.07 K/UL (ref 0–0.2)
BASOPHILS # BLD AUTO: 0.07 K/UL (ref 0–0.2)
BASOPHILS NFR BLD: 1.1 % (ref 0–1.9)
BASOPHILS NFR BLD: 1.1 % (ref 0–1.9)
BILIRUB SERPL-MCNC: 0.1 MG/DL (ref 0.1–1)
BILIRUB SERPL-MCNC: 0.1 MG/DL (ref 0.1–1)
BUN SERPL-MCNC: 53 MG/DL (ref 6–20)
BUN SERPL-MCNC: 53 MG/DL (ref 6–20)
CALCIUM SERPL-MCNC: 10.8 MG/DL (ref 8.7–10.5)
CALCIUM SERPL-MCNC: 10.8 MG/DL (ref 8.7–10.5)
CHLORIDE SERPL-SCNC: 104 MMOL/L (ref 95–110)
CHLORIDE SERPL-SCNC: 104 MMOL/L (ref 95–110)
CO2 SERPL-SCNC: 21 MMOL/L (ref 23–29)
CO2 SERPL-SCNC: 21 MMOL/L (ref 23–29)
CREAT SERPL-MCNC: 2 MG/DL (ref 0.5–1.4)
CREAT SERPL-MCNC: 2 MG/DL (ref 0.5–1.4)
DIFFERENTIAL METHOD: ABNORMAL
DIFFERENTIAL METHOD: ABNORMAL
EOSINOPHIL # BLD AUTO: 0.3 K/UL (ref 0–0.5)
EOSINOPHIL # BLD AUTO: 0.3 K/UL (ref 0–0.5)
EOSINOPHIL NFR BLD: 4.6 % (ref 0–8)
EOSINOPHIL NFR BLD: 4.6 % (ref 0–8)
ERYTHROCYTE [DISTWIDTH] IN BLOOD BY AUTOMATED COUNT: 12.5 % (ref 11.5–14.5)
ERYTHROCYTE [DISTWIDTH] IN BLOOD BY AUTOMATED COUNT: 12.5 % (ref 11.5–14.5)
EST. GFR  (NO RACE VARIABLE): 33.8 ML/MIN/1.73 M^2
EST. GFR  (NO RACE VARIABLE): 33.8 ML/MIN/1.73 M^2
GLUCOSE SERPL-MCNC: 93 MG/DL (ref 70–110)
GLUCOSE SERPL-MCNC: 93 MG/DL (ref 70–110)
HCT VFR BLD AUTO: 28.3 % (ref 37–48.5)
HCT VFR BLD AUTO: 28.3 % (ref 37–48.5)
HGB BLD-MCNC: 9.1 G/DL (ref 12–16)
HGB BLD-MCNC: 9.1 G/DL (ref 12–16)
IMM GRANULOCYTES # BLD AUTO: 0.02 K/UL (ref 0–0.04)
IMM GRANULOCYTES # BLD AUTO: 0.02 K/UL (ref 0–0.04)
IMM GRANULOCYTES NFR BLD AUTO: 0.3 % (ref 0–0.5)
IMM GRANULOCYTES NFR BLD AUTO: 0.3 % (ref 0–0.5)
LYMPHOCYTES # BLD AUTO: 1.7 K/UL (ref 1–4.8)
LYMPHOCYTES # BLD AUTO: 1.7 K/UL (ref 1–4.8)
LYMPHOCYTES NFR BLD: 26.4 % (ref 18–48)
LYMPHOCYTES NFR BLD: 26.4 % (ref 18–48)
MAGNESIUM SERPL-MCNC: 2.1 MG/DL (ref 1.6–2.6)
MAGNESIUM SERPL-MCNC: 2.1 MG/DL (ref 1.6–2.6)
MCH RBC QN AUTO: 28.3 PG (ref 27–31)
MCH RBC QN AUTO: 28.3 PG (ref 27–31)
MCHC RBC AUTO-ENTMCNC: 32.2 G/DL (ref 32–36)
MCHC RBC AUTO-ENTMCNC: 32.2 G/DL (ref 32–36)
MCV RBC AUTO: 88 FL (ref 82–98)
MCV RBC AUTO: 88 FL (ref 82–98)
MONOCYTES # BLD AUTO: 0.6 K/UL (ref 0.3–1)
MONOCYTES # BLD AUTO: 0.6 K/UL (ref 0.3–1)
MONOCYTES NFR BLD: 9.1 % (ref 4–15)
MONOCYTES NFR BLD: 9.1 % (ref 4–15)
NEUTROPHILS # BLD AUTO: 3.7 K/UL (ref 1.8–7.7)
NEUTROPHILS # BLD AUTO: 3.7 K/UL (ref 1.8–7.7)
NEUTROPHILS NFR BLD: 58.5 % (ref 38–73)
NEUTROPHILS NFR BLD: 58.5 % (ref 38–73)
NRBC BLD-RTO: 0 /100 WBC
NRBC BLD-RTO: 0 /100 WBC
PHOSPHATE SERPL-MCNC: 4.9 MG/DL (ref 2.7–4.5)
PHOSPHATE SERPL-MCNC: 4.9 MG/DL (ref 2.7–4.5)
PLATELET # BLD AUTO: 288 K/UL (ref 150–450)
PLATELET # BLD AUTO: 288 K/UL (ref 150–450)
PMV BLD AUTO: 10.6 FL (ref 9.2–12.9)
PMV BLD AUTO: 10.6 FL (ref 9.2–12.9)
POTASSIUM SERPL-SCNC: 4.9 MMOL/L (ref 3.5–5.1)
POTASSIUM SERPL-SCNC: 4.9 MMOL/L (ref 3.5–5.1)
PROT SERPL-MCNC: 7.6 G/DL (ref 6–8.4)
PROT SERPL-MCNC: 7.6 G/DL (ref 6–8.4)
RBC # BLD AUTO: 3.21 M/UL (ref 4–5.4)
RBC # BLD AUTO: 3.21 M/UL (ref 4–5.4)
SODIUM SERPL-SCNC: 136 MMOL/L (ref 136–145)
SODIUM SERPL-SCNC: 136 MMOL/L (ref 136–145)
URATE SERPL-MCNC: 8.9 MG/DL (ref 2.4–5.7)
URATE SERPL-MCNC: 8.9 MG/DL (ref 2.4–5.7)
WBC # BLD AUTO: 6.37 K/UL (ref 3.9–12.7)
WBC # BLD AUTO: 6.37 K/UL (ref 3.9–12.7)

## 2022-08-12 PROCEDURE — 83735 ASSAY OF MAGNESIUM: CPT | Performed by: INTERNAL MEDICINE

## 2022-08-12 PROCEDURE — 36415 COLL VENOUS BLD VENIPUNCTURE: CPT | Mod: PO | Performed by: INTERNAL MEDICINE

## 2022-08-12 PROCEDURE — 80053 COMPREHEN METABOLIC PANEL: CPT | Performed by: INTERNAL MEDICINE

## 2022-08-12 PROCEDURE — 84100 ASSAY OF PHOSPHORUS: CPT | Performed by: INTERNAL MEDICINE

## 2022-08-12 PROCEDURE — 85025 COMPLETE CBC W/AUTO DIFF WBC: CPT | Performed by: INTERNAL MEDICINE

## 2022-08-12 PROCEDURE — 84550 ASSAY OF BLOOD/URIC ACID: CPT | Performed by: INTERNAL MEDICINE

## 2022-08-18 ENCOUNTER — OFFICE VISIT (OUTPATIENT)
Dept: NEPHROLOGY | Facility: CLINIC | Age: 32
End: 2022-08-18
Payer: MEDICAID

## 2022-08-18 VITALS
SYSTOLIC BLOOD PRESSURE: 126 MMHG | RESPIRATION RATE: 18 BRPM | DIASTOLIC BLOOD PRESSURE: 79 MMHG | HEIGHT: 60 IN | WEIGHT: 115 LBS | BODY MASS INDEX: 22.58 KG/M2 | HEART RATE: 101 BPM

## 2022-08-18 DIAGNOSIS — Z87.898 HISTORY OF PITUITARY TUMOR: ICD-10-CM

## 2022-08-18 DIAGNOSIS — N18.32 STAGE 3B CHRONIC KIDNEY DISEASE: Primary | ICD-10-CM

## 2022-08-18 DIAGNOSIS — N18.32 ANEMIA IN STAGE 3B CHRONIC KIDNEY DISEASE: ICD-10-CM

## 2022-08-18 DIAGNOSIS — D63.1 ANEMIA IN STAGE 3B CHRONIC KIDNEY DISEASE: ICD-10-CM

## 2022-08-18 DIAGNOSIS — R79.89 ABNORMAL BLOOD CREATININE LEVEL: ICD-10-CM

## 2022-08-18 PROCEDURE — 3008F BODY MASS INDEX DOCD: CPT | Mod: CPTII,,, | Performed by: INTERNAL MEDICINE

## 2022-08-18 PROCEDURE — 3066F NEPHROPATHY DOC TX: CPT | Mod: CPTII,,, | Performed by: INTERNAL MEDICINE

## 2022-08-18 PROCEDURE — 99999 PR PBB SHADOW E&M-EST. PATIENT-LVL IV: ICD-10-PCS | Mod: PBBFAC,,, | Performed by: INTERNAL MEDICINE

## 2022-08-18 PROCEDURE — 99215 PR OFFICE/OUTPT VISIT, EST, LEVL V, 40-54 MIN: ICD-10-PCS | Mod: S$PBB,,, | Performed by: INTERNAL MEDICINE

## 2022-08-18 PROCEDURE — 3078F DIAST BP <80 MM HG: CPT | Mod: CPTII,,, | Performed by: INTERNAL MEDICINE

## 2022-08-18 PROCEDURE — 3044F HG A1C LEVEL LT 7.0%: CPT | Mod: CPTII,,, | Performed by: INTERNAL MEDICINE

## 2022-08-18 PROCEDURE — 3078F PR MOST RECENT DIASTOLIC BLOOD PRESSURE < 80 MM HG: ICD-10-PCS | Mod: CPTII,,, | Performed by: INTERNAL MEDICINE

## 2022-08-18 PROCEDURE — 3044F PR MOST RECENT HEMOGLOBIN A1C LEVEL <7.0%: ICD-10-PCS | Mod: CPTII,,, | Performed by: INTERNAL MEDICINE

## 2022-08-18 PROCEDURE — 3008F PR BODY MASS INDEX (BMI) DOCUMENTED: ICD-10-PCS | Mod: CPTII,,, | Performed by: INTERNAL MEDICINE

## 2022-08-18 PROCEDURE — 3074F SYST BP LT 130 MM HG: CPT | Mod: CPTII,,, | Performed by: INTERNAL MEDICINE

## 2022-08-18 PROCEDURE — 99215 OFFICE O/P EST HI 40 MIN: CPT | Mod: S$PBB,,, | Performed by: INTERNAL MEDICINE

## 2022-08-18 PROCEDURE — 1159F MED LIST DOCD IN RCRD: CPT | Mod: CPTII,,, | Performed by: INTERNAL MEDICINE

## 2022-08-18 PROCEDURE — 3074F PR MOST RECENT SYSTOLIC BLOOD PRESSURE < 130 MM HG: ICD-10-PCS | Mod: CPTII,,, | Performed by: INTERNAL MEDICINE

## 2022-08-18 PROCEDURE — 1159F PR MEDICATION LIST DOCUMENTED IN MEDICAL RECORD: ICD-10-PCS | Mod: CPTII,,, | Performed by: INTERNAL MEDICINE

## 2022-08-18 PROCEDURE — 99214 OFFICE O/P EST MOD 30 MIN: CPT | Mod: PBBFAC,PN | Performed by: INTERNAL MEDICINE

## 2022-08-18 PROCEDURE — 99999 PR PBB SHADOW E&M-EST. PATIENT-LVL IV: CPT | Mod: PBBFAC,,, | Performed by: INTERNAL MEDICINE

## 2022-08-18 PROCEDURE — 3066F PR DOCUMENTATION OF TREATMENT FOR NEPHROPATHY: ICD-10-PCS | Mod: CPTII,,, | Performed by: INTERNAL MEDICINE

## 2022-08-18 NOTE — PROGRESS NOTES
Name: Miquel Salcedo  Medical Record Number: [id number]  Date of Service: 08/18/2022  Note By: Camden Anguiano DO    [facility]    Reason for Consultation: Renal Failure  Referring Provider: Dr. Juarez    History of Present Illness:  Miquel Salcedo is a 31 y.o. adult with past medical history of pituitary tumor/astrocytoma and renal insufficiency who presents for further evaluation and workup.  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  Does not have peripheral vision in L eye from tumor and sts has diabetic retinopathy.  She was diagnosed with DM in 2003 and was put on insulin for a while and currently off of DM medication but is getting tx for her retinopathy. The pt denies any CP, SOB, N/V, C/F, no HA. She denies any foam, blood or dysuria at this time. Admits to having a UTI in October, 2021 at which time she had hematuria and dysuria.  No clots. Sts has had COVID vaccine X 2, no boosters.  Has not had a COVID infection.      History of CKD  Nephrologist none  Access n/a?  Dialysis center n/a?    Past Medical History:  Past Medical History:   Diagnosis Date    Astrocytoma brain tumor     Diabetes mellitus     Hypothyroid        Past Surgical History:  Past Surgical History:   Procedure Laterality Date    CSF SHUNT         Family History:  Family History   Problem Relation Age of Onset    Stroke Father     Heart disease Father        Social History:  Social History     Socioeconomic History    Marital status: Single   Tobacco Use    Smoking status: Former Smoker    Smokeless tobacco: Former User    Tobacco comment: vap intermitently   Substance and Sexual Activity    Alcohol use: Yes     Comment: rarely    Drug use: No    Sexual activity: Never     Partners: Female, Male       Home Medications:   (Not in a hospital admission)      Allergies:  Patient has no known allergies.    Review of Systems:  10 point  review of systems was conducted and was negative except as mentioned in the HPI.    Physical Exam:  Vitals:  Vitals:    08/18/22 1305   BP: 126/79   Pulse: 101   Resp: 18     [unfilled]      Exam  General: No acute distress, well groomed, alert and oriented x 3  HEENT: Normocephalic, atraumatic, EOM's intact bilaterally, external inspection of ears and nose normal, moist mucous membranes, no oral ulcerations/lesions  Neck: Supple, symmetrical, trachea midline, no thyromegaly, no JVD  Respiratory: Clear to auscultation bilaterally, respirations unlabored, no rales/rhonchi/wheezing  Cardiovacular: Regular rate and rhythm, S1, S2 normal, + murmur d/t bicuspid aortic valve, rubs or gallops  Gastrointestinal: Soft, non-tender, bowel sounds normal, no hepatosplenomegaly  Musculoskeletal: No knee or ankle joint tenderness or swelling.   Extremities: No clubbing or cyanosis of bilateral upper extremities; no lower extremity edema bilaterally, radial pulses 2+ bilaterally, symmetric  Skin: warm and dry; no rash on exposed skin  Neurologic: CN grossly intact. No asterixis    Labs:  A1C:  Recent Labs   Lab 10/04/21  1003 02/14/22  0915 06/15/22  1136   Hemoglobin A1C 5.2 5.4 5.3     CBC:  Recent Labs   Lab 02/02/21  1043 08/12/22  0922   WBC 6.99 6.37  6.37   RBC 4.01 L 3.21 L  3.21 L   Hemoglobin 11.6 L 9.1 L  9.1 L   Hematocrit 35.2 L 28.3 L  28.3 L   Platelets 299 288  288   MCV 88 88  88   MCH 28.9 28.3  28.3   MCHC 33.0 32.2  32.2     CMP:  Recent Labs   Lab 06/15/22  1136 08/12/22  0922   Glucose 84 93  93   Calcium 9.6 10.8 H  10.8 H   Albumin 3.6 3.9  3.9   Total Protein 6.9 7.6  7.6   Sodium 139 136  136   Potassium 5.0 4.9  4.9   CO2 19 L 21 L  21 L   Chloride 107 104  104   BUN 32 H 53 H  53 H   Creatinine 1.7 H 2.0 H  2.0 H   Alkaline Phosphatase 41 L 36 L  36 L   ALT 19 13  13   AST 22 14  14   Total Bilirubin 0.1 0.1  0.1   eGFR if  46.0 A  --      LIPIDS:  Recent Labs   Lab  02/02/21  1043 10/04/21  1003 02/14/22  0915 06/15/22  1136   TSH 4.170 H 4.803 H 6.543 H 3.292  3.292   HDL 71  --   --   --    Cholesterol 174  --   --   --    Triglycerides 107  --   --   --    LDL Cholesterol 81.6  --   --   --    HDL/Cholesterol Ratio 40.8  --   --   --    Non-HDL Cholesterol 103  --   --   --    Total Cholesterol/HDL Ratio 2.5  --   --   --      TSH:  Recent Labs   Lab 10/04/21  1003 02/14/22  0915 06/15/22  1136   TSH 4.803 H 6.543 H 3.292  3.292     URINALYSIS:  No results for input(s): COLORU, CLARITYU, SPECGRAV, PHUR, PROTEINUA, GLUCOSEU, BILIRUBINCON, BLOODU, WBCU, RBCU, BACTERIA, MUCUS, NITRITE, LEUKOCYTESUR, UROBILINOGEN, HYALINECASTS in the last 2160 hours.     Lab Results   Component Value Date    WBC 6.37 08/12/2022    WBC 6.37 08/12/2022    HGB 9.1 (L) 08/12/2022    HGB 9.1 (L) 08/12/2022    HCT 28.3 (L) 08/12/2022    HCT 28.3 (L) 08/12/2022     08/12/2022     08/12/2022    K 4.9 08/12/2022    K 4.9 08/12/2022     08/12/2022     08/12/2022    CO2 21 (L) 08/12/2022    CO2 21 (L) 08/12/2022    BUN 53 (H) 08/12/2022    BUN 53 (H) 08/12/2022    CREATININE 2.0 (H) 08/12/2022    CREATININE 2.0 (H) 08/12/2022    EGFRNONAA 39.9 (A) 06/15/2022    CALCIUM 10.8 (H) 08/12/2022    CALCIUM 10.8 (H) 08/12/2022    PHOS 4.9 (H) 08/12/2022    PHOS 4.9 (H) 08/12/2022    MG 2.1 08/12/2022    MG 2.1 08/12/2022    ALBUMIN 3.9 08/12/2022    ALBUMIN 3.9 08/12/2022    AST 14 08/12/2022    AST 14 08/12/2022    ALT 13 08/12/2022    ALT 13 08/12/2022       No results found for: EXTANC, EXTWBC, EXTSEGS, EXTPLATELETS, EXTHEMOGLOBI, EXTHEMATOCRI, EXTCREATININ, EXTSODIUM, EXTPOTASSIUM, EXTBUN, EXTCO2, EXTCALCIUM, EXTPHOSPHORU, EXTGLUCOSE, EXTALBUMIN, EXTAST, EXTALT, EXTBILITOTAL, EXTLIPASE, EXTAMYLASE    No results found for: EXTCYCLOSLVL, EXTSIROLIMUS, EXTTACROLVL, EXTPROTCRE, EXTPTHINTACT, EXTPROTEINUA, EXTWBCUA, EXTRBCUA    Imaging Studies: n/a  ?    Assessment/Plan:  31 y.o. adult  with:    1- CKD 3b - She is non-oliguric. Renal function appears to be dropping relatively fast.  Unknown etiology at this time.  Avoid NSAIDs, dehydration, volume depletion.     2. Hyperuricemia - May benefit from allopurinol..    3. Hypercalcemia - Check iPTH.    4. Hyperphosphatemia -    5. Anemia - check B12, Folate, Iron studies.    RTC in 1 month      Camden Anguiano DO  [facility]

## 2022-08-23 ENCOUNTER — TELEPHONE (OUTPATIENT)
Dept: OPTOMETRY | Facility: CLINIC | Age: 32
End: 2022-08-23
Payer: MEDICAID

## 2022-08-26 ENCOUNTER — HOSPITAL ENCOUNTER (OUTPATIENT)
Dept: RADIOLOGY | Facility: HOSPITAL | Age: 32
Discharge: HOME | End: 2022-08-26
Attending: INTERNAL MEDICINE
Payer: MEDICAID

## 2022-08-26 DIAGNOSIS — R79.89 ABNORMAL BLOOD CREATININE LEVEL: ICD-10-CM

## 2022-08-26 DIAGNOSIS — N18.32 ANEMIA IN STAGE 3B CHRONIC KIDNEY DISEASE: ICD-10-CM

## 2022-08-26 DIAGNOSIS — Z87.898 HISTORY OF PITUITARY TUMOR: ICD-10-CM

## 2022-08-26 DIAGNOSIS — N18.32 STAGE 3B CHRONIC KIDNEY DISEASE: ICD-10-CM

## 2022-08-26 DIAGNOSIS — D63.1 ANEMIA IN STAGE 3B CHRONIC KIDNEY DISEASE: ICD-10-CM

## 2022-08-26 PROCEDURE — 93975 VASCULAR STUDY: CPT | Mod: 26,,, | Performed by: RADIOLOGY

## 2022-08-26 PROCEDURE — 93975 US RENAL ARTERY STENOSIS HYPERTEN (XPD): ICD-10-PCS | Mod: 26,,, | Performed by: RADIOLOGY

## 2022-08-26 PROCEDURE — 93975 VASCULAR STUDY: CPT | Mod: TC,PO

## 2022-08-26 PROCEDURE — 76770 US EXAM ABDO BACK WALL COMP: CPT | Mod: 26,59,, | Performed by: RADIOLOGY

## 2022-08-26 PROCEDURE — 76770 US RENAL ARTERY STENOSIS HYPERTEN (XPD): ICD-10-PCS | Mod: 26,59,, | Performed by: RADIOLOGY

## 2022-09-13 ENCOUNTER — LAB VISIT (OUTPATIENT)
Dept: LAB | Facility: HOSPITAL | Age: 32
End: 2022-09-13
Attending: INTERNAL MEDICINE
Payer: MEDICAID

## 2022-09-13 DIAGNOSIS — D63.1 ANEMIA IN STAGE 3B CHRONIC KIDNEY DISEASE: ICD-10-CM

## 2022-09-13 DIAGNOSIS — N18.32 STAGE 3B CHRONIC KIDNEY DISEASE: ICD-10-CM

## 2022-09-13 DIAGNOSIS — Z87.898 HISTORY OF PITUITARY TUMOR: ICD-10-CM

## 2022-09-13 DIAGNOSIS — R79.89 ABNORMAL BLOOD CREATININE LEVEL: ICD-10-CM

## 2022-09-13 DIAGNOSIS — N18.32 ANEMIA IN STAGE 3B CHRONIC KIDNEY DISEASE: ICD-10-CM

## 2022-09-13 LAB
ALBUMIN SERPL BCP-MCNC: 3.7 G/DL (ref 3.5–5.2)
ALP SERPL-CCNC: 43 U/L (ref 55–135)
ALT SERPL W/O P-5'-P-CCNC: 11 U/L (ref 10–44)
ANION GAP SERPL CALC-SCNC: 10 MMOL/L (ref 8–16)
AST SERPL-CCNC: 14 U/L (ref 10–40)
BASOPHILS # BLD AUTO: 0.06 K/UL (ref 0–0.2)
BASOPHILS NFR BLD: 0.8 % (ref 0–1.9)
BILIRUB SERPL-MCNC: 0.1 MG/DL (ref 0.1–1)
BUN SERPL-MCNC: 27 MG/DL (ref 6–20)
C3 SERPL-MCNC: 133 MG/DL (ref 50–180)
C4 SERPL-MCNC: 29 MG/DL (ref 11–44)
CALCIUM SERPL-MCNC: 9.3 MG/DL (ref 8.7–10.5)
CHLORIDE SERPL-SCNC: 105 MMOL/L (ref 95–110)
CO2 SERPL-SCNC: 23 MMOL/L (ref 23–29)
CREAT SERPL-MCNC: 1.7 MG/DL (ref 0.5–1.4)
DIFFERENTIAL METHOD: ABNORMAL
EOSINOPHIL # BLD AUTO: 0.3 K/UL (ref 0–0.5)
EOSINOPHIL NFR BLD: 4.2 % (ref 0–8)
ERYTHROCYTE [DISTWIDTH] IN BLOOD BY AUTOMATED COUNT: 13 % (ref 11.5–14.5)
EST. GFR  (NO RACE VARIABLE): 41.1 ML/MIN/1.73 M^2
FERRITIN SERPL-MCNC: 83 NG/ML (ref 20–300)
FOLATE SERPL-MCNC: 13.7 NG/ML (ref 4–24)
GLUCOSE SERPL-MCNC: 77 MG/DL (ref 70–110)
HCT VFR BLD AUTO: 26.8 % (ref 37–48.5)
HGB BLD-MCNC: 8.3 G/DL (ref 12–16)
IMM GRANULOCYTES # BLD AUTO: 0.02 K/UL (ref 0–0.04)
IMM GRANULOCYTES NFR BLD AUTO: 0.3 % (ref 0–0.5)
IRON SERPL-MCNC: 58 UG/DL (ref 30–160)
LYMPHOCYTES # BLD AUTO: 2.3 K/UL (ref 1–4.8)
LYMPHOCYTES NFR BLD: 30.4 % (ref 18–48)
MAGNESIUM SERPL-MCNC: 2 MG/DL (ref 1.6–2.6)
MCH RBC QN AUTO: 27.9 PG (ref 27–31)
MCHC RBC AUTO-ENTMCNC: 31 G/DL (ref 32–36)
MCV RBC AUTO: 90 FL (ref 82–98)
MONOCYTES # BLD AUTO: 0.7 K/UL (ref 0.3–1)
MONOCYTES NFR BLD: 8.7 % (ref 4–15)
NEUTROPHILS # BLD AUTO: 4.2 K/UL (ref 1.8–7.7)
NEUTROPHILS NFR BLD: 55.6 % (ref 38–73)
NRBC BLD-RTO: 0 /100 WBC
PHOSPHATE SERPL-MCNC: 2.9 MG/DL (ref 2.7–4.5)
PLATELET # BLD AUTO: 291 K/UL (ref 150–450)
PMV BLD AUTO: 10.2 FL (ref 9.2–12.9)
POTASSIUM SERPL-SCNC: 5.2 MMOL/L (ref 3.5–5.1)
PROT SERPL-MCNC: 6.9 G/DL (ref 6–8.4)
PTH-INTACT SERPL-MCNC: 254 PG/ML (ref 9–77)
RBC # BLD AUTO: 2.98 M/UL (ref 4–5.4)
SATURATED IRON: 14 % (ref 20–50)
SODIUM SERPL-SCNC: 138 MMOL/L (ref 136–145)
TOTAL IRON BINDING CAPACITY: 428 UG/DL (ref 250–450)
TRANSFERRIN SERPL-MCNC: 289 MG/DL (ref 200–375)
WBC # BLD AUTO: 7.57 K/UL (ref 3.9–12.7)

## 2022-09-13 PROCEDURE — 82397 CHEMILUMINESCENT ASSAY: CPT | Performed by: INTERNAL MEDICINE

## 2022-09-13 PROCEDURE — 80053 COMPREHEN METABOLIC PANEL: CPT | Performed by: INTERNAL MEDICINE

## 2022-09-13 PROCEDURE — 86160 COMPLEMENT ANTIGEN: CPT | Mod: 59 | Performed by: INTERNAL MEDICINE

## 2022-09-13 PROCEDURE — 86160 COMPLEMENT ANTIGEN: CPT | Performed by: INTERNAL MEDICINE

## 2022-09-13 PROCEDURE — 83735 ASSAY OF MAGNESIUM: CPT | Performed by: INTERNAL MEDICINE

## 2022-09-13 PROCEDURE — 84100 ASSAY OF PHOSPHORUS: CPT | Performed by: INTERNAL MEDICINE

## 2022-09-13 PROCEDURE — 36415 COLL VENOUS BLD VENIPUNCTURE: CPT | Mod: PO | Performed by: INTERNAL MEDICINE

## 2022-09-13 PROCEDURE — 82746 ASSAY OF FOLIC ACID SERUM: CPT | Performed by: INTERNAL MEDICINE

## 2022-09-13 PROCEDURE — 85025 COMPLETE CBC W/AUTO DIFF WBC: CPT | Performed by: INTERNAL MEDICINE

## 2022-09-13 PROCEDURE — 84466 ASSAY OF TRANSFERRIN: CPT | Performed by: INTERNAL MEDICINE

## 2022-09-13 PROCEDURE — 86060 ANTISTREPTOLYSIN O TITER: CPT | Performed by: INTERNAL MEDICINE

## 2022-09-13 PROCEDURE — 86038 ANTINUCLEAR ANTIBODIES: CPT | Performed by: INTERNAL MEDICINE

## 2022-09-13 PROCEDURE — 82728 ASSAY OF FERRITIN: CPT | Performed by: INTERNAL MEDICINE

## 2022-09-13 PROCEDURE — 83970 ASSAY OF PARATHORMONE: CPT | Performed by: INTERNAL MEDICINE

## 2022-09-14 LAB — ANA SER QL IF: NORMAL

## 2022-09-15 ENCOUNTER — OFFICE VISIT (OUTPATIENT)
Dept: NEPHROLOGY | Facility: CLINIC | Age: 32
End: 2022-09-15
Payer: MEDICAID

## 2022-09-15 ENCOUNTER — LAB VISIT (OUTPATIENT)
Dept: LAB | Facility: HOSPITAL | Age: 32
End: 2022-09-15
Attending: INTERNAL MEDICINE
Payer: MEDICAID

## 2022-09-15 VITALS
HEART RATE: 106 BPM | WEIGHT: 124.25 LBS | DIASTOLIC BLOOD PRESSURE: 79 MMHG | HEIGHT: 60 IN | SYSTOLIC BLOOD PRESSURE: 126 MMHG | BODY MASS INDEX: 24.39 KG/M2 | RESPIRATION RATE: 18 BRPM

## 2022-09-15 DIAGNOSIS — N18.32 TYPE 2 DIABETES MELLITUS WITH STAGE 3B CHRONIC KIDNEY DISEASE, WITHOUT LONG-TERM CURRENT USE OF INSULIN: ICD-10-CM

## 2022-09-15 DIAGNOSIS — D63.1 ANEMIA IN CHRONIC KIDNEY DISEASE, UNSPECIFIED CKD STAGE: ICD-10-CM

## 2022-09-15 DIAGNOSIS — E11.22 TYPE 2 DIABETES MELLITUS WITH STAGE 3B CHRONIC KIDNEY DISEASE, WITHOUT LONG-TERM CURRENT USE OF INSULIN: ICD-10-CM

## 2022-09-15 DIAGNOSIS — N18.9 ANEMIA IN CHRONIC KIDNEY DISEASE, UNSPECIFIED CKD STAGE: ICD-10-CM

## 2022-09-15 DIAGNOSIS — N18.32 STAGE 3B CHRONIC KIDNEY DISEASE: Primary | ICD-10-CM

## 2022-09-15 DIAGNOSIS — E03.9 HYPOTHYROIDISM, UNSPECIFIED TYPE: ICD-10-CM

## 2022-09-15 DIAGNOSIS — N18.32 STAGE 3B CHRONIC KIDNEY DISEASE: ICD-10-CM

## 2022-09-15 LAB
BILIRUB UR QL STRIP: NEGATIVE
CLARITY UR: CLEAR
COLOR UR: COLORLESS
GLUCOSE UR QL STRIP: NEGATIVE
HGB UR QL STRIP: NEGATIVE
KETONES UR QL STRIP: NEGATIVE
LEUKOCYTE ESTERASE UR QL STRIP: NEGATIVE
NITRITE UR QL STRIP: NEGATIVE
PH UR STRIP: 6 [PH] (ref 5–8)
PROT UR QL STRIP: NEGATIVE
SP GR UR STRIP: 1 (ref 1–1.03)
URN SPEC COLLECT METH UR: ABNORMAL
UROBILINOGEN UR STRIP-ACNC: NEGATIVE EU/DL

## 2022-09-15 PROCEDURE — 99214 OFFICE O/P EST MOD 30 MIN: CPT | Mod: S$PBB,,, | Performed by: INTERNAL MEDICINE

## 2022-09-15 PROCEDURE — 3078F DIAST BP <80 MM HG: CPT | Mod: CPTII,,, | Performed by: INTERNAL MEDICINE

## 2022-09-15 PROCEDURE — 3008F BODY MASS INDEX DOCD: CPT | Mod: CPTII,,, | Performed by: INTERNAL MEDICINE

## 2022-09-15 PROCEDURE — 3078F PR MOST RECENT DIASTOLIC BLOOD PRESSURE < 80 MM HG: ICD-10-PCS | Mod: CPTII,,, | Performed by: INTERNAL MEDICINE

## 2022-09-15 PROCEDURE — 3066F NEPHROPATHY DOC TX: CPT | Mod: CPTII,,, | Performed by: INTERNAL MEDICINE

## 2022-09-15 PROCEDURE — 99999 PR PBB SHADOW E&M-EST. PATIENT-LVL III: CPT | Mod: PBBFAC,,, | Performed by: INTERNAL MEDICINE

## 2022-09-15 PROCEDURE — 3066F PR DOCUMENTATION OF TREATMENT FOR NEPHROPATHY: ICD-10-PCS | Mod: CPTII,,, | Performed by: INTERNAL MEDICINE

## 2022-09-15 PROCEDURE — 3074F PR MOST RECENT SYSTOLIC BLOOD PRESSURE < 130 MM HG: ICD-10-PCS | Mod: CPTII,,, | Performed by: INTERNAL MEDICINE

## 2022-09-15 PROCEDURE — 3044F PR MOST RECENT HEMOGLOBIN A1C LEVEL <7.0%: ICD-10-PCS | Mod: CPTII,,, | Performed by: INTERNAL MEDICINE

## 2022-09-15 PROCEDURE — 1159F MED LIST DOCD IN RCRD: CPT | Mod: CPTII,,, | Performed by: INTERNAL MEDICINE

## 2022-09-15 PROCEDURE — 99213 OFFICE O/P EST LOW 20 MIN: CPT | Mod: PBBFAC,PN | Performed by: INTERNAL MEDICINE

## 2022-09-15 PROCEDURE — 99999 PR PBB SHADOW E&M-EST. PATIENT-LVL III: ICD-10-PCS | Mod: PBBFAC,,, | Performed by: INTERNAL MEDICINE

## 2022-09-15 PROCEDURE — 3074F SYST BP LT 130 MM HG: CPT | Mod: CPTII,,, | Performed by: INTERNAL MEDICINE

## 2022-09-15 PROCEDURE — 81003 URINALYSIS AUTO W/O SCOPE: CPT | Performed by: INTERNAL MEDICINE

## 2022-09-15 PROCEDURE — 3044F HG A1C LEVEL LT 7.0%: CPT | Mod: CPTII,,, | Performed by: INTERNAL MEDICINE

## 2022-09-15 PROCEDURE — 1159F PR MEDICATION LIST DOCUMENTED IN MEDICAL RECORD: ICD-10-PCS | Mod: CPTII,,, | Performed by: INTERNAL MEDICINE

## 2022-09-15 PROCEDURE — 99214 PR OFFICE/OUTPT VISIT, EST, LEVL IV, 30-39 MIN: ICD-10-PCS | Mod: S$PBB,,, | Performed by: INTERNAL MEDICINE

## 2022-09-15 PROCEDURE — 3008F PR BODY MASS INDEX (BMI) DOCUMENTED: ICD-10-PCS | Mod: CPTII,,, | Performed by: INTERNAL MEDICINE

## 2022-09-15 NOTE — PROGRESS NOTES
Name: Miquel Salcedo  Medical Record Number: [id number]  Date of Service: 09/15/2022  Note By: Camden Anguiano DO    [facility]    Reason for Consultation: Renal Failure  Referring Provider: Dr. Juarez     History of Present Illness:  Miquel Salcedo is a 31 y.o. adult with past medical history of pituitary tumor/astrocytoma and renal insufficiency who presents for further evaluation and workup.  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  Does not have peripheral vision in L eye from tumor and sts has diabetic retinopathy.  She was diagnosed with DM in 2003 and was put on insulin for a while and currently off of DM medication but is getting tx for her retinopathy with Dr. Nicholson. The pt denies any CP, SOB, N/V, C/F, no HA. She denies any foam, blood or dysuria at this time. Admits to having a UTI in October, 2021 at which time she had hematuria and dysuria.  No clots. Sts has had COVID vaccine X 2, no boosters.  Has not had a COVID infection. Sts has been cutting back on meat.        History of CKD  Nephrologist none  Access n/a?  Dialysis center n/a?    Past Medical History:  Past Medical History:   Diagnosis Date    Astrocytoma brain tumor     Diabetes mellitus     Hypothyroid        Past Surgical History:  Past Surgical History:   Procedure Laterality Date    CSF SHUNT         Family History:  Family History   Problem Relation Age of Onset    Stroke Father     Heart disease Father        Social History:  Social History     Socioeconomic History    Marital status: Single   Tobacco Use    Smoking status: Former    Smokeless tobacco: Former    Tobacco comments:     vap intermitently   Substance and Sexual Activity    Alcohol use: Yes     Comment: rarely    Drug use: No    Sexual activity: Never     Partners: Female, Male       Home Medications:   (Not in a hospital admission)      Allergies:  Patient has no known  allergies.    Review of Systems:  10 point review of systems was conducted and was negative except as mentioned in the HPI.    Physical Exam:  Vitals:  Vitals:    09/15/22 1440   BP: 126/79   Pulse: 106   Resp: 18     [unfilled]      Exam  General: No acute distress, well groomed, alert and oriented x 3  HEENT: Normocephalic, atraumatic, EOM's intact bilaterally, external inspection of ears and nose normal, moist mucous membranes, no oral ulcerations/lesions  Neck: Supple, symmetrical, trachea midline, no thyromegaly, no JVD  Respiratory: Clear to auscultation bilaterally, respirations unlabored, no rales/rhonchi/wheezing  Cardiovacular: Regular rate and rhythm, S1, S2 normal, no murmurs, rubs or gallops  Gastrointestinal: Soft, non-tender, bowel sounds normal, no hepatosplenomegaly  Musculoskeletal: No knee or ankle joint tenderness or swelling.   Extremities: No clubbing or cyanosis of bilateral upper extremities; + 2 lower extremity (pretibial) edema bilaterally, radial pulses 2+ bilaterally, symmetric  Skin: warm and dry; no rash on exposed skin  Neurologic: CN grossly intact    Labs:  A1C:  Recent Labs   Lab 10/04/21  1003 02/14/22  0915 06/15/22  1136   Hemoglobin A1C 5.2 5.4 5.3     CBC:  Recent Labs   Lab 02/02/21  1043 08/12/22  0922 09/13/22  0712   WBC 6.99 6.37  6.37 7.57   RBC 4.01 L 3.21 L  3.21 L 2.98 L   Hemoglobin 11.6 L 9.1 L  9.1 L 8.3 L   Hematocrit 35.2 L 28.3 L  28.3 L 26.8 L   Platelets 299 288  288 291   MCV 88 88  88 90   MCH 28.9 28.3  28.3 27.9   MCHC 33.0 32.2  32.2 31.0 L     CMP:  Recent Labs   Lab 06/15/22  1136 08/12/22  0922 09/13/22  0712   Glucose 84   < > 77   Calcium 9.6   < > 9.3   Albumin 3.6   < > 3.7   Total Protein 6.9   < > 6.9   Sodium 139   < > 138   Potassium 5.0   < > 5.2 H   CO2 19 L   < > 23   Chloride 107   < > 105   BUN 32 H   < > 27 H   Creatinine 1.7 H   < > 1.7 H   Alkaline Phosphatase 41 L   < > 43 L   ALT 19   < > 11   AST 22   < > 14   Total Bilirubin  0.1   < > 0.1   eGFR if  46.0 A  --   --     < > = values in this interval not displayed.     LIPIDS:  Recent Labs   Lab 02/02/21  1043 10/04/21  1003 02/14/22  0915 06/15/22  1136   TSH 4.170 H 4.803 H 6.543 H 3.292  3.292   HDL 71  --   --   --    Cholesterol 174  --   --   --    Triglycerides 107  --   --   --    LDL Cholesterol 81.6  --   --   --    HDL/Cholesterol Ratio 40.8  --   --   --    Non-HDL Cholesterol 103  --   --   --    Total Cholesterol/HDL Ratio 2.5  --   --   --      TSH:  Recent Labs   Lab 10/04/21  1003 02/14/22  0915 06/15/22  1136   TSH 4.803 H 6.543 H 3.292  3.292     URINALYSIS:  No results for input(s): COLORU, CLARITYU, SPECGRAV, PHUR, PROTEINUA, GLUCOSEU, BILIRUBINCON, BLOODU, WBCU, RBCU, BACTERIA, MUCUS, NITRITE, LEUKOCYTESUR, UROBILINOGEN, HYALINECASTS in the last 2160 hours.     Lab Results   Component Value Date    WBC 7.57 09/13/2022    HGB 8.3 (L) 09/13/2022    HCT 26.8 (L) 09/13/2022     09/13/2022    K 5.2 (H) 09/13/2022     09/13/2022    CO2 23 09/13/2022    BUN 27 (H) 09/13/2022    CREATININE 1.7 (H) 09/13/2022    EGFRNONAA 39.9 (A) 06/15/2022    CALCIUM 9.3 09/13/2022    PHOS 2.9 09/13/2022    MG 2.0 09/13/2022    ALBUMIN 3.7 09/13/2022    AST 14 09/13/2022    ALT 11 09/13/2022       No results found for: EXTANC, EXTWBC, EXTSEGS, EXTPLATELETS, EXTHEMOGLOBI, EXTHEMATOCRI, EXTCREATININ, EXTSODIUM, EXTPOTASSIUM, EXTBUN, EXTCO2, EXTCALCIUM, EXTPHOSPHORU, EXTGLUCOSE, EXTALBUMIN, EXTAST, EXTALT, EXTBILITOTAL, EXTLIPASE, EXTAMYLASE    No results found for: EXTCYCLOSLVL, EXTSIROLIMUS, EXTTACROLVL, EXTPROTCRE, EXTPTHINTACT, EXTPROTEINUA, EXTWBCUA, EXTRBCUA    Imaging Studies: US Renal Artery Stenosis Hyperten (xpd)  Order: [id number]  Status: Final result     Visible to patient: Yes (seen)     Next appt: 09/30/2022 at 08:00 AM in Psychiatry (Alba Broderick PA-C)     Dx: History of pituitary tumor; Abnormal ...     0 Result Notes  Details    Reading Physician  Reading Date Result Priority   Maximo Combs MD  [phone] 8/26/2022 Routine     Narrative & Impression  EXAMINATION:  US RENAL ARTERY STENOSIS HYPERTEN (XPD)     CLINICAL HISTORY:  Other specified abnormal findings of blood chemistry     COMPARISON:  None     FINDINGS:  Right kidney measures 10.5cm     Left kidney measures 09.1cm     Echotexture normal.  No hydronephrosis or concerning mass.     Right renal artery peak systolic velocity: 176cm/sec     Left renal artery peak systolic velocity: 160cm/sec     Aorta peak systolic velocity: 71cm/sec     Renal artery to aortic ratio: Right-2.5 and left-2.2     Resistive indices:     Right upper: 0.72     Right mid: 0.75     Right lower: 0.74     Left upper: 0.63     Left mid: 0.75     Left lower: 0.75     Impression:     No evidence of renal artery stenosis.        Electronically signed by: Maximo Combs MD  Date:                                            08/26/2022  Time:                                           10:26     ?    Assessment/Plan:  31 y.o. adult with:     1- CKD 3b - She is non-oliguric. Renal function appears to be dropping relatively fast but much better at a GFR of 41.1.  Unknown etiology at this time.  Avoid NSAIDs, dehydration, volume depletion. Plan Kidney biopsy.     2. Hyperuricemia - May benefit from allopurinol..     3. Hypercalcemia - Check iPTH.     4. Hyperphosphatemia -     5. Anemia - check B12, Folate, Iron studies.     RTC in 1 month        Camden Anguiano DO  [facility]

## 2022-09-16 LAB — ASO AB SERPL-ACNC: 166 IU/ML

## 2022-09-20 LAB — PTH RELATED PROT SERPL-SCNC: 1.2 PMOL/L

## 2022-09-30 ENCOUNTER — OFFICE VISIT (OUTPATIENT)
Dept: PSYCHIATRY | Facility: CLINIC | Age: 32
End: 2022-09-30
Payer: MEDICAID

## 2022-09-30 DIAGNOSIS — F90.1 ATTENTION DEFICIT HYPERACTIVITY DISORDER (ADHD), PREDOMINANTLY HYPERACTIVE TYPE: ICD-10-CM

## 2022-09-30 DIAGNOSIS — F43.23 ADJUSTMENT DISORDER WITH MIXED ANXIETY AND DEPRESSED MOOD: ICD-10-CM

## 2022-09-30 DIAGNOSIS — R41.840 DISTURBED CONCENTRATION: ICD-10-CM

## 2022-09-30 PROCEDURE — 3066F NEPHROPATHY DOC TX: CPT | Mod: SA,HB,CPTII,95 | Performed by: PHYSICIAN ASSISTANT

## 2022-09-30 PROCEDURE — 1159F PR MEDICATION LIST DOCUMENTED IN MEDICAL RECORD: ICD-10-PCS | Mod: SA,HB,CPTII,95 | Performed by: PHYSICIAN ASSISTANT

## 2022-09-30 PROCEDURE — 1160F RVW MEDS BY RX/DR IN RCRD: CPT | Mod: SA,HB,CPTII,95 | Performed by: PHYSICIAN ASSISTANT

## 2022-09-30 PROCEDURE — 99205 PR OFFICE/OUTPT VISIT, NEW, LEVL V, 60-74 MIN: ICD-10-PCS | Mod: SA,HB,95, | Performed by: PHYSICIAN ASSISTANT

## 2022-09-30 PROCEDURE — 3044F PR MOST RECENT HEMOGLOBIN A1C LEVEL <7.0%: ICD-10-PCS | Mod: SA,HB,CPTII,95 | Performed by: PHYSICIAN ASSISTANT

## 2022-09-30 PROCEDURE — 1159F MED LIST DOCD IN RCRD: CPT | Mod: SA,HB,CPTII,95 | Performed by: PHYSICIAN ASSISTANT

## 2022-09-30 PROCEDURE — 1160F PR REVIEW ALL MEDS BY PRESCRIBER/CLIN PHARMACIST DOCUMENTED: ICD-10-PCS | Mod: SA,HB,CPTII,95 | Performed by: PHYSICIAN ASSISTANT

## 2022-09-30 PROCEDURE — 3044F HG A1C LEVEL LT 7.0%: CPT | Mod: SA,HB,CPTII,95 | Performed by: PHYSICIAN ASSISTANT

## 2022-09-30 PROCEDURE — 99205 OFFICE O/P NEW HI 60 MIN: CPT | Mod: SA,HB,95, | Performed by: PHYSICIAN ASSISTANT

## 2022-09-30 PROCEDURE — 3066F PR DOCUMENTATION OF TREATMENT FOR NEPHROPATHY: ICD-10-PCS | Mod: SA,HB,CPTII,95 | Performed by: PHYSICIAN ASSISTANT

## 2022-09-30 RX ORDER — BUPROPION HYDROCHLORIDE 150 MG/1
150 TABLET ORAL DAILY
Qty: 30 TABLET | Refills: 1 | Status: SHIPPED | OUTPATIENT
Start: 2022-09-30 | End: 2022-11-29

## 2022-09-30 NOTE — PROGRESS NOTES
"The patient location is: [place], [place]  The chief complaint leading to consultation is: concentration    Visit type: audiovisual    Face to Face time with patient: 50  65 minutes of total time spent on the encounter, which includes face to face time and non-face to face time preparing to see the patient (eg, review of tests), Obtaining and/or reviewing separately obtained history, Documenting clinical information in the electronic or other health record, Independently interpreting results (not separately reported) and communicating results to the patient/family/caregiver, or Care coordination (not separately reported).         Each patient to whom he or she provides medical services by telemedicine is:  (1) informed of the relationship between the physician and patient and the respective role of any other health care provider with respect to management of the patient; and (2) notified that he or she may decline to receive medical services by telemedicine and may withdraw from such care at any time.    Notes:    Outpatient Psychiatry Initial Visit (PA-C)    9/30/2022    Miquel Salcedo, a 31 y.o. adult, presenting for initial evaluation visit. Met with patient.    Reason for Encounter: Referral from Ene Rutherford . Patient complains of: ADHD    History of Present Illness:     Patient presents to establish psychiatric care. Patient currently sees Dr. Ene Rutherford for therapy who suggested the patient be evaluated for ADHD.     The patient reports that last semester she went back to school but had to drop out of school for a second time, stating "when I went to study for school, I kept doing other things instead." She reports a long history of not being to focus on things or getting hyperfocused on the wrong thing. Robert states this is not new- she states she was a "mediocre student". She also states she struggled when working at [employer] to learn and catch on to recipes.     Patient reports attention " deficit hyperactivity symptoms that have continued through adulthood of overlooking details, having trouble focusing on assignments and conversations,being disorganized, having trouble prioritizing tasks, avoiding lengthy mental tasks, being easily distracted, forgetfulness, and restless and fidgetiness. Patient states she jumps from task to task. She also states she has to re-read the same pages in a book several times.  Problems happen at home, the community, and occupationally. These symptoms have been happening over patient's entire life.     Patient denies any history of heart palpitations, syncope, dizziness, dyspnea on exertion, shortness of breath, or chest pain. He denies any family history of arrhythmias, enlarged heart, or sudden cardiac death. Patient does have a bicuspid valve and reports her father suffered heart attacks.     The patient reports a history of anxiety and admits to excessive anxiety/worry/fear, more days than not, about numerous issues, difficulty controlling the worry, over thinking, restlessness, poor concentration, and increased irritability. She reports a lot of anxiety regarding her transition.   She also endorses anxiety when she feel sshe's not catch on to things she feels she should    Patient also reports a history of depression in which she reports diminished mood or loss of interest/anhedonia, lack of motivation, decreased energy, increased sleep, increased socail isolation, decreased concentration and excessive guilt and hopelessness.She denies SI/HI.     Patient reports she is having health issues with her kidney and is being followed by a nephrologist.     Standardized Screenings tools:   PHQ9: 14  LIZ- 7: 13  Mood Disorder Questionnaire:   Adult ADHD Self-Report Scale: Part A: 15 Part B: 30      Stressors:  lack of focus, transition    History:     Past Psychiatric History:   Previous therapy: yes  Previous psychiatric treatment and medication trials: no  Previous  "psychiatric hospitalizations: no  Previous diagnoses: yes - Adjustment Disorder, ADHD per Dr. Rutherford  Previous suicide attempts: no  History of violence: no  Currently in treatment with Dr Rutherford, Endocrinologist.  Suicidal Ideation: no  Auditory Hallucination: no  Visual Hallucination: no  Education:  currently in college at [employer]    Social History:  Housing: house in [place]  Lives with: alone  Marital status: no  Children: no  Education: college student  Special Ed: no  Legal: no  Employment: [employer] on campus  Access to gun: yes  Hx of abuse:  no    Substance Abuse History:  Recreational drugs: no  Alcohol: rarely  Tobacco use: no  Rehab: no    Family Hx  Brother- ADHD  Father- ADHD    Neuro Hx  Seizure:no  Head trauma/TBI:no- history of brain tumor      Review Of Systems:     Medical Review Of Systems:  Pertinent positives noted in HPI    Psychiatric Review Of Systems:  Sleep: yes, fluctuates  Appetite changes: yes, fluctuates  Weight changes: yes, slight increase  Energy: no  Anhedonia no  Somatic symptoms: no  Anxiety/panic: no  Guilty/hopeless: yes, "a little bit"  Self-injurious behavior/risky behavior: no  Any drugs: no  Alcohol: no       Current Evaluation:       Mental Status Evaluation:  Appearance:  unremarkable, age appropriate, casually dressed   Behavior:  normal, cooperative, friendly and cooperative   Speech:  no latency; no press   Mood:  steady   Affect:  congruent and appropriate   Thought Process:  normal and logical   Thought Content:  normal, no suicidality, no homicidality, delusions, or paranoia   Sensorium:  grossly intact   Cognition:  grossly intact   Insight:  intact   Judgment:  behavior is adequate to circumstances     Physical/Somatic Complaints   The patient lists: no physical complaints.    Constitutional  unremarkable, age appropriate, casually dressed       Laboratory Data  Lab Visit on 09/15/2022   Component Date Value Ref Range Status    HIV 1/2 Ag/Ab 09/15/2022 " Non-reactive  Non-reactive Final    Hepatitis B Surface Ag 09/15/2022 Non-reactive  Non-reactive Final    Hep B C IgM 09/15/2022 Non-reactive  Non-reactive Final    Hep A IgM 09/15/2022 Non-reactive  Non-reactive Final    Hepatitis C Ab 09/15/2022 Non-reactive  Non-reactive Final    Rheumatoid Factor 09/15/2022 <13.0  0.0 - 15.0 IU/mL Final    Complement (C-3) 09/15/2022 130  50 - 180 mg/dL Final    Complement (C-4) 09/15/2022 29  11 - 44 mg/dL Final    GBM Ab 09/15/2022 <0.2  <1.0 (Negative) U Final    Cytoplasmic Neutrophilic Ab 09/15/2022 <1:20  <1:20 Titer Final    Perinuclear (P-ANCA) 09/15/2022 <1:20  <1:20 Titer Final    Cytoplasmic Neutrophilic Ab 09/15/2022 <1:20  <1:20 Titer Final    Perinuclear (P-ANCA) 09/15/2022 <1:20  <1:20 Titer Final    Cytoplasmic Neutrophilic Ab 09/15/2022 <1:20  <1:20 Titer Final    Perinuclear (P-ANCA) 09/15/2022 <1:20  <1:20 Titer Final    RPR 09/15/2022 Non-reactive  Non-reactive Final    WBC 09/15/2022 7.95  3.90 - 12.70 K/uL Final    RBC 09/15/2022 2.78 (L)  4.00 - 5.40 M/uL Final    Hemoglobin 09/15/2022 7.8 (L)  12.0 - 16.0 g/dL Final    Hematocrit 09/15/2022 24.6 (L)  37.0 - 48.5 % Final    MCV 09/15/2022 89  82 - 98 fL Final    MCH 09/15/2022 28.1  27.0 - 31.0 pg Final    MCHC 09/15/2022 31.7 (L)  32.0 - 36.0 g/dL Final    RDW 09/15/2022 12.6  11.5 - 14.5 % Final    Platelets 09/15/2022 268  150 - 450 K/uL Final    MPV 09/15/2022 9.9  9.2 - 12.9 fL Final    Immature Granulocytes 09/15/2022 0.5  0.0 - 0.5 % Final    Gran # (ANC) 09/15/2022 5.1  1.8 - 7.7 K/uL Final    Immature Grans (Abs) 09/15/2022 0.04  0.00 - 0.04 K/uL Final    Lymph # 09/15/2022 2.0  1.0 - 4.8 K/uL Final    Mono # 09/15/2022 0.6  0.3 - 1.0 K/uL Final    Eos # 09/15/2022 0.2  0.0 - 0.5 K/uL Final    Baso # 09/15/2022 0.05  0.00 - 0.20 K/uL Final    nRBC 09/15/2022 0  0 /100 WBC Final    Gran % 09/15/2022 64.3  38.0 - 73.0 % Final    Lymph % 09/15/2022 25.0  18.0 - 48.0 % Final    Mono % 09/15/2022 7.5   4.0 - 15.0 % Final    Eosinophil % 09/15/2022 2.1  0.0 - 8.0 % Final    Basophil % 09/15/2022 0.6  0.0 - 1.9 % Final    Differential Method 09/15/2022 Automated   Final    Sodium 09/15/2022 139  136 - 145 mmol/L Final    Potassium 09/15/2022 4.5  3.5 - 5.1 mmol/L Final    Chloride 09/15/2022 106  95 - 110 mmol/L Final    CO2 09/15/2022 22 (L)  23 - 29 mmol/L Final    Glucose 09/15/2022 151 (H)  70 - 110 mg/dL Final    BUN 09/15/2022 32 (H)  6 - 20 mg/dL Final    Creatinine 09/15/2022 1.8 (H)  0.5 - 1.4 mg/dL Final    Calcium 09/15/2022 9.8  8.7 - 10.5 mg/dL Final    Total Protein 09/15/2022 7.4  6.0 - 8.4 g/dL Final    Albumin 09/15/2022 3.5  3.5 - 5.2 g/dL Final    Total Bilirubin 09/15/2022 0.1  0.1 - 1.0 mg/dL Final    Alkaline Phosphatase 09/15/2022 47 (L)  55 - 135 U/L Final    AST 09/15/2022 16  10 - 40 U/L Final    ALT 09/15/2022 15  10 - 44 U/L Final    Anion Gap 09/15/2022 11  8 - 16 mmol/L Final    eGFR 09/15/2022 38 (A)  >60 mL/min/1.73 m^2 Final    Magnesium 09/15/2022 1.7  1.6 - 2.6 mg/dL Final    Phosphorus 09/15/2022 2.8  2.7 - 4.5 mg/dL Final   Lab Visit on 09/15/2022   Component Date Value Ref Range Status    Specimen UA 09/15/2022 Urine, Clean Catch   Final    Color, UA 09/15/2022 Colorless (A)  Yellow, Straw, Yolanda Final    Appearance, UA 09/15/2022 Clear  Clear Final    pH, UA 09/15/2022 6.0  5.0 - 8.0 Final    Specific Gravity, UA 09/15/2022 1.005  1.005 - 1.030 Final    Protein, UA 09/15/2022 Negative  Negative Final    Glucose, UA 09/15/2022 Negative  Negative Final    Ketones, UA 09/15/2022 Negative  Negative Final    Bilirubin (UA) 09/15/2022 Negative  Negative Final    Occult Blood UA 09/15/2022 Negative  Negative Final    Nitrite, UA 09/15/2022 Negative  Negative Final    Urobilinogen, UA 09/15/2022 Negative  <2.0 EU/dL Final    Leukocytes, UA 09/15/2022 Negative  Negative Final   Lab Visit on 09/13/2022   Component Date Value Ref Range Status    Complement (C-3) 09/13/2022 133  50 - 180  mg/dL Final    Complement (C-4) 09/13/2022 29  11 - 44 mg/dL Final    JABARI Screen 09/13/2022 Negative <1:80  Negative <1:80 Final    ASO TITER 09/13/2022 166  <200 IU/mL Final    WBC 09/13/2022 7.57  3.90 - 12.70 K/uL Final    RBC 09/13/2022 2.98 (L)  4.00 - 5.40 M/uL Final    Hemoglobin 09/13/2022 8.3 (L)  12.0 - 16.0 g/dL Final    Hematocrit 09/13/2022 26.8 (L)  37.0 - 48.5 % Final    MCV 09/13/2022 90  82 - 98 fL Final    MCH 09/13/2022 27.9  27.0 - 31.0 pg Final    MCHC 09/13/2022 31.0 (L)  32.0 - 36.0 g/dL Final    RDW 09/13/2022 13.0  11.5 - 14.5 % Final    Platelets 09/13/2022 291  150 - 450 K/uL Final    MPV 09/13/2022 10.2  9.2 - 12.9 fL Final    Immature Granulocytes 09/13/2022 0.3  0.0 - 0.5 % Final    Gran # (ANC) 09/13/2022 4.2  1.8 - 7.7 K/uL Final    Immature Grans (Abs) 09/13/2022 0.02  0.00 - 0.04 K/uL Final    Lymph # 09/13/2022 2.3  1.0 - 4.8 K/uL Final    Mono # 09/13/2022 0.7  0.3 - 1.0 K/uL Final    Eos # 09/13/2022 0.3  0.0 - 0.5 K/uL Final    Baso # 09/13/2022 0.06  0.00 - 0.20 K/uL Final    nRBC 09/13/2022 0  0 /100 WBC Final    Gran % 09/13/2022 55.6  38.0 - 73.0 % Final    Lymph % 09/13/2022 30.4  18.0 - 48.0 % Final    Mono % 09/13/2022 8.7  4.0 - 15.0 % Final    Eosinophil % 09/13/2022 4.2  0.0 - 8.0 % Final    Basophil % 09/13/2022 0.8  0.0 - 1.9 % Final    Differential Method 09/13/2022 Automated   Final    Sodium 09/13/2022 138  136 - 145 mmol/L Final    Potassium 09/13/2022 5.2 (H)  3.5 - 5.1 mmol/L Final    Chloride 09/13/2022 105  95 - 110 mmol/L Final    CO2 09/13/2022 23  23 - 29 mmol/L Final    Glucose 09/13/2022 77  70 - 110 mg/dL Final    BUN 09/13/2022 27 (H)  6 - 20 mg/dL Final    Creatinine 09/13/2022 1.7 (H)  0.5 - 1.4 mg/dL Final    Calcium 09/13/2022 9.3  8.7 - 10.5 mg/dL Final    Total Protein 09/13/2022 6.9  6.0 - 8.4 g/dL Final    Albumin 09/13/2022 3.7  3.5 - 5.2 g/dL Final    Total Bilirubin 09/13/2022 0.1  0.1 - 1.0 mg/dL Final    Alkaline Phosphatase 09/13/2022 43  (L)  55 - 135 U/L Final    AST 09/13/2022 14  10 - 40 U/L Final    ALT 09/13/2022 11  10 - 44 U/L Final    Anion Gap 09/13/2022 10  8 - 16 mmol/L Final    eGFR 09/13/2022 41.1 (A)  >60 mL/min/1.73 m^2 Final    Magnesium 09/13/2022 2.0  1.6 - 2.6 mg/dL Final    Phosphorus 09/13/2022 2.9  2.7 - 4.5 mg/dL Final    PTH, Intact 09/13/2022 254.0 (H)  9.0 - 77.0 pg/mL Final    Iron 09/13/2022 58  30 - 160 ug/dL Final    Transferrin 09/13/2022 289  200 - 375 mg/dL Final    TIBC 09/13/2022 428  250 - 450 ug/dL Final    Saturated Iron 09/13/2022 14 (L)  20 - 50 % Final    Ferritin 09/13/2022 83  20.0 - 300.0 ng/mL Final    Folate 09/13/2022 13.7  4.0 - 24.0 ng/mL Final    PTH-Related Protein 09/13/2022 1.2  < or = 4.2 pmol/L Final         Medications  Outpatient Encounter Medications as of 9/30/2022   Medication Sig Dispense Refill    blood sugar diagnostic Strp Checks bg 1-2  times a day. Dispense with lancets. One touch ultra  mini meter used. 100 strip 11    estradioL (ESTRACE) 2 MG tablet Take 1.5 tablets (3 mg total) by mouth 2 (two) times daily. 270 tablet 3    finasteride (PROSCAR) 5 mg tablet TAKE 1 TABLET BY MOUTH EVERY DAY 90 tablet 3    levothyroxine (SYNTHROID) 50 MCG tablet Take 1 tablet (50 mcg total) by mouth before breakfast. 90 tablet 3    ONETOUCH DELICA PLUS LANCET 33 gauge Misc CHECK 1-2 TIMES DAILY 100 each 9    progesterone (PROMETRIUM) 100 MG capsule Take 1 capsule (100 mg total) by mouth every evening. 90 capsule 2     No facility-administered encounter medications on file as of 9/30/2022.           Assessment - Diagnosis - Goals:     Impression: Miquel Salcedo, a 31 y.o. adult, presenting for initial evaluation visit. Patient reports a psychiatric history of social anxiety, depression, and gender dysphoria presenting today to address symptoms of ADHD. Patient reports a long history of lack of focus and concentration.     Patient has no history of taking psychiatric medications and is open to  a trial of wellbutrin for depression and focus.     EKG order and cardiology referral placed due to bicuspid valve and family cardiac history    Diagnosis: Adjustment Disorder with mixed anxiety and depressed mood                     ADHD, combined type    Strengths and Liabilities: Strength: Patient accepts guidance/feedback, Strength: Patient is expressive/articulate., Strength: Patient is intelligent., Strength: Patient is motivated for change., Strength: Patient is physically healthy., Strength: Patient has reasonable judgment., Strength: Patient is stable., Liability: Patient lacks coping skills.    Treatment Goals:    Anxiety: acquiring relapse prevention skills, eliminating all anxiety symptoms (SCL-90-R scores in normal range), reducing negative automatic thoughts, reducing physical symptoms of anxiety, and reducing time spent worrying (<30 minutes/day)  Depression: acquiring relapse prevention skills, increasing energy, increasing interest in usual activities, increasing motivation, reducing excessive guilt, reducing fatigue, and reducing negative automatic thoughts  Improving focus and concentration    Treatment Plan/Recommendations:   Medication Management: The risks and benefits of medication were discussed with the patient.  Start wellbutrin 150 mg daily  EKG ordered  Cardiology referral placed  Discussed diagnosis, risks and benefits of proposed treatment above vs alternative treatments vs no treatment, and potential side effects of these treatments, and the inherent unpredicatbility of individual response to treatment.The patient expresses understanding and gives informed consent to pursue treatment at this time believing that the potential benefits outweight the potential risks. Patient has no other questions.  Patient voices understanding and agreement with this plan  Encouraged patient to keep future appointments.  Instructed patient to call or message with questions  In the event of an emergency,  including suicidal ideation, patient was advised to go to the emergency room      Return to Clinic: 1 month    Total time: 60 minutes with more than 50% of time spent counseling and/or coordinating care.  (which included pts differential diagnosis and prognosis for psychiatric conditions, risks, benefits of treatments, instructions and adherence to treatment plan, risk reduction, reviewing current psychiatric medication regimen, medical problems and social stressors. In addtion to possible discussion with other healthcare provider/s)    Alba Broderick PA-C

## 2022-10-06 ENCOUNTER — TELEPHONE (OUTPATIENT)
Dept: INTERVENTIONAL RADIOLOGY/VASCULAR | Facility: HOSPITAL | Age: 32
End: 2022-10-06
Payer: MEDICAID

## 2022-10-06 ENCOUNTER — PATIENT MESSAGE (OUTPATIENT)
Dept: PSYCHIATRY | Facility: CLINIC | Age: 32
End: 2022-10-06
Payer: MEDICAID

## 2022-10-07 ENCOUNTER — TELEPHONE (OUTPATIENT)
Dept: CARDIOLOGY | Facility: CLINIC | Age: 32
End: 2022-10-07
Payer: MEDICAID

## 2022-10-07 NOTE — TELEPHONE ENCOUNTER
----- Message from Viry Edward sent at 10/7/2022  3:38 PM CDT -----  Contact: Patient  Type: Appointment Request      Name of Caller:Patient  When is the first available appointment?no appt generated  Symptoms:Referral on Active Request  Would the patient rather a call back or a response via [facility]? Call back   Best Call Back Number:[phone]  Additional Information: Please assist

## 2022-10-10 ENCOUNTER — TELEPHONE (OUTPATIENT)
Dept: INTERVENTIONAL RADIOLOGY/VASCULAR | Facility: HOSPITAL | Age: 32
End: 2022-10-10
Payer: MEDICAID

## 2022-10-11 ENCOUNTER — TELEPHONE (OUTPATIENT)
Dept: INTERVENTIONAL RADIOLOGY/VASCULAR | Facility: HOSPITAL | Age: 32
End: 2022-10-11
Payer: MEDICAID

## 2022-10-13 ENCOUNTER — LAB VISIT (OUTPATIENT)
Dept: LAB | Facility: HOSPITAL | Age: 32
End: 2022-10-13
Attending: INTERNAL MEDICINE
Payer: MEDICAID

## 2022-10-13 DIAGNOSIS — R79.89 ELEVATED SERUM CREATININE: ICD-10-CM

## 2022-10-13 LAB
ALBUMIN SERPL BCP-MCNC: 3.9 G/DL (ref 3.5–5.2)
ALP SERPL-CCNC: 45 U/L (ref 55–135)
ALT SERPL W/O P-5'-P-CCNC: 15 U/L (ref 10–44)
ANION GAP SERPL CALC-SCNC: 9 MMOL/L (ref 8–16)
AST SERPL-CCNC: 16 U/L (ref 10–40)
BILIRUB SERPL-MCNC: 0.2 MG/DL (ref 0.1–1)
BUN SERPL-MCNC: 31 MG/DL (ref 6–20)
CALCIUM SERPL-MCNC: 9.3 MG/DL (ref 8.7–10.5)
CHLORIDE SERPL-SCNC: 110 MMOL/L (ref 95–110)
CO2 SERPL-SCNC: 19 MMOL/L (ref 23–29)
CREAT SERPL-MCNC: 2.1 MG/DL (ref 0.5–1.4)
EST. GFR  (NO RACE VARIABLE): 31.9 ML/MIN/1.73 M^2
GLUCOSE SERPL-MCNC: 76 MG/DL (ref 70–110)
POTASSIUM SERPL-SCNC: 5.4 MMOL/L (ref 3.5–5.1)
PROT SERPL-MCNC: 7.2 G/DL (ref 6–8.4)
SODIUM SERPL-SCNC: 138 MMOL/L (ref 136–145)

## 2022-10-13 PROCEDURE — 80053 COMPREHEN METABOLIC PANEL: CPT | Performed by: INTERNAL MEDICINE

## 2022-10-13 PROCEDURE — 36415 COLL VENOUS BLD VENIPUNCTURE: CPT | Mod: PO | Performed by: INTERNAL MEDICINE

## 2022-10-13 PROCEDURE — 82670 ASSAY OF TOTAL ESTRADIOL: CPT | Performed by: INTERNAL MEDICINE

## 2022-10-14 LAB — ESTRADIOL SERPL-MCNC: 174 PG/ML

## 2022-10-21 ENCOUNTER — OFFICE VISIT (OUTPATIENT)
Dept: ENDOCRINOLOGY | Facility: CLINIC | Age: 32
End: 2022-10-21
Attending: INTERNAL MEDICINE
Payer: MEDICAID

## 2022-10-21 DIAGNOSIS — N25.81 SECONDARY HYPERPARATHYROIDISM OF RENAL ORIGIN: ICD-10-CM

## 2022-10-21 DIAGNOSIS — E11.319 TYPE 2 DIABETES MELLITUS WITH RETINOPATHY, WITHOUT LONG-TERM CURRENT USE OF INSULIN, MACULAR EDEMA PRESENCE UNSPECIFIED, UNSPECIFIED LATERALITY, UNSPECIFIED RETINOPATHY SEVERITY: ICD-10-CM

## 2022-10-21 DIAGNOSIS — F64.0 TRANSGENDER WOMAN ON HORMONE THERAPY: Primary | ICD-10-CM

## 2022-10-21 DIAGNOSIS — E03.8 SUBCLINICAL HYPOTHYROIDISM: ICD-10-CM

## 2022-10-21 DIAGNOSIS — Z85.841 H/O ASTROCYTOMA: ICD-10-CM

## 2022-10-21 DIAGNOSIS — E29.1 TESTICULAR FAILURE: ICD-10-CM

## 2022-10-21 DIAGNOSIS — Z79.899 TRANSGENDER WOMAN ON HORMONE THERAPY: Primary | ICD-10-CM

## 2022-10-21 PROCEDURE — 1159F PR MEDICATION LIST DOCUMENTED IN MEDICAL RECORD: ICD-10-PCS | Mod: CPTII,95,, | Performed by: INTERNAL MEDICINE

## 2022-10-21 PROCEDURE — 99214 OFFICE O/P EST MOD 30 MIN: CPT | Mod: 95,KX,, | Performed by: INTERNAL MEDICINE

## 2022-10-21 PROCEDURE — 1160F PR REVIEW ALL MEDS BY PRESCRIBER/CLIN PHARMACIST DOCUMENTED: ICD-10-PCS | Mod: CPTII,95,, | Performed by: INTERNAL MEDICINE

## 2022-10-21 PROCEDURE — 99214 PR OFFICE/OUTPT VISIT, EST, LEVL IV, 30-39 MIN: ICD-10-PCS | Mod: 95,KX,, | Performed by: INTERNAL MEDICINE

## 2022-10-21 PROCEDURE — 3066F PR DOCUMENTATION OF TREATMENT FOR NEPHROPATHY: ICD-10-PCS | Mod: CPTII,95,, | Performed by: INTERNAL MEDICINE

## 2022-10-21 PROCEDURE — 3066F NEPHROPATHY DOC TX: CPT | Mod: CPTII,95,, | Performed by: INTERNAL MEDICINE

## 2022-10-21 PROCEDURE — 3044F HG A1C LEVEL LT 7.0%: CPT | Mod: CPTII,95,, | Performed by: INTERNAL MEDICINE

## 2022-10-21 PROCEDURE — 1159F MED LIST DOCD IN RCRD: CPT | Mod: CPTII,95,, | Performed by: INTERNAL MEDICINE

## 2022-10-21 PROCEDURE — 1160F RVW MEDS BY RX/DR IN RCRD: CPT | Mod: CPTII,95,, | Performed by: INTERNAL MEDICINE

## 2022-10-21 PROCEDURE — 3044F PR MOST RECENT HEMOGLOBIN A1C LEVEL <7.0%: ICD-10-PCS | Mod: CPTII,95,, | Performed by: INTERNAL MEDICINE

## 2022-10-21 RX ORDER — MINOXIDIL 2.5 MG/1
2.5 TABLET ORAL DAILY PRN
COMMUNITY
Start: 2022-10-17 | End: 2023-04-06

## 2022-10-21 NOTE — PROGRESS NOTES
Subjective:      Patient ID: Miquel Salcedo is a 31 y.o. adult.    Chief Complaint:  Gender     History of Present Illness  With regards to her gender incongruence care:    she did change her 's license but is holding off on the name change --  This is due to her estate      Gender identity - female (she /her)   Current Therapy / counselor - yes         Gender Surgeries - none, but interested FFS and GRS      Fertility concerns - not  interested     Started cross hormone therapy:  Dec 2020      Current regimen:   estrogen  3 mg bid - SL    finasteride - she would like to stay on it   Using Rogaine and biotin      Off progesterone  oct 2021 - July 2022      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      She is doing well  No missed doses    + breast growth               In school   [employer]   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       Single   identifies Pansexual   Mom is aware           Also has T2DM  Diagnosed:  Age 14   - does not recall genetic testing   C-peptide and insulin levels were normal with an elevated glucose   Complications:  DR Eye exam 2017 proliferative  DR OD  Severe non proliferative  DR OS- seeing the retina specialist    No numbness of tingling of her feet      Regimen: none   Was on Amaryl 4 qam  actos 30  insulin in the past          Was On a statin for dyslipidemia  - off      She also was told she has hypothyroidism   We restarted levothyroxine in February of 2022  her current dose is 50 mcg a day     Regarding pituitary:  Has seen NS - Dr Haynes   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  BP Readings from Last 3 Encounters:   09/15/22 126/79   08/18/22 126/79   10/14/21 136/78     Wt Readings from Last 1 Encounters:    09/15/22 1440 56.4 kg (124 lb 3.7 oz)     There is no height or weight on file to calculate BMI.      Physical Exam  Constitutional:       Appearance: Normal appearance.   Psychiatric:         Mood and Affect: Mood normal.         Behavior: Behavior normal.         Thought Content: Thought content normal.         Judgment: Judgment normal.              Lab Review:   Lab Results   Component Value Date    HGBA1C 5.3 06/15/2022     Lab Results   Component Value Date    CHOL 174 02/02/2021    HDL 71 02/02/2021    LDLCALC 81.6 02/02/2021    TRIG 107 02/02/2021    CHOLHDL 40.8 02/02/2021     Lab Results   Component Value Date     10/13/2022    K 5.4 (H) 10/13/2022     10/13/2022    CO2 19 (L) 10/13/2022    GLU 76 10/13/2022    BUN 31 (H) 10/13/2022    CREATININE 2.1 (H) 10/13/2022    CALCIUM 9.3 10/13/2022    PROT 7.2 10/13/2022    ALBUMIN 3.9 10/13/2022    BILITOT 0.2 10/13/2022    ALKPHOS 45 (L) 10/13/2022    AST 16 10/13/2022    ALT 15 10/13/2022    ANIONGAP 9 10/13/2022    ESTGFRAFRICA 46.0 (A) 06/15/2022    EGFRNONAA 39.9 (A) 06/15/2022    TSH 3.292 06/15/2022    TSH 3.292 06/15/2022     Vit D, 25-Hydroxy   Date Value Ref Range Status   02/02/2021 31 30 - 96 ng/mL Final     Comment:     Vitamin D deficiency.........<10 ng/mL                              Vitamin D insufficiency......10-29 ng/mL       Vitamin D sufficiency........> or equal to 30 ng/mL  Vitamin D toxicity............>100 ng/mL        Latest Reference Range & Units 10/13/22 12:40   Estradiol See Text pg/mL 174     Assessment and Plan     Transwoman (she/her)      Reviewed goals of therapy   Return to clinic in  6 months         Healthy lifestyle stressed  Discussed increased risk of dvt   Avoid prolonged immobility  D/c ert prior to any procedures           Type 2 diabetes mellitus with retinopathy, without long-term current use of insulin  Controlled  Periodic labs   No evidence of T1DM      Subclinical hypothyroidism  Clinically and  biochemically euthyroid.    No changes.    Testicular failure  Ok to stay off aldactone    discussed stopping finasteride but she prefers to stay on it  If ert stopped would need to address bone health     erections are not important to her        H/O astrocytoma    F/u  NS      Secondary hyperparathyroidism of renal origin  Follow-up with Nephrology             The patient location is: home  The chief complaint leading to consultation is: gender    Visit type: audiovisual    Face to Face time with patient: 20 minutes of total time spent on the encounter, which includes face to face time and non-face to face time preparing to see the patient (eg, review of tests), Obtaining and/or reviewing separately obtained history, Documenting clinical information in the electronic or other health record, Independently interpreting results (not separately reported) and communicating results to the patient/family/caregiver, or Care coordination (not separately reported).         Each patient to whom he or she provides medical services by telemedicine is:  (1) informed of the relationship between the physician and patient and the respective role of any other health care provider with respect to management of the patient; and (2) notified that he or she may decline to receive medical services by telemedicine and may withdraw from such care at any time.

## 2022-10-21 NOTE — PATIENT INSTRUCTIONS
Thank you for completing a virtual visit with me!     Per our conversation:  Make sure you stay hydrated.  Please keep your scheduled follow-up with nephrology  3.   We will plan a telemedicine or an in-clinic visit in 6 months with labs prior to that appointment.        Please let me know if you have any other questions.    Thank you,  Shruthi Juarez MD

## 2022-10-21 NOTE — ASSESSMENT & PLAN NOTE
Reviewed goals of therapy   Return to clinic in  6 months         Healthy lifestyle stressed  Discussed increased risk of dvt   Avoid prolonged immobility  D/c ert prior to any procedures

## 2022-11-28 DIAGNOSIS — R79.89 ELEVATED SERUM CREATININE: ICD-10-CM

## 2022-11-28 DIAGNOSIS — E78.2 MIXED DYSLIPIDEMIA: Primary | ICD-10-CM

## 2022-12-08 ENCOUNTER — OFFICE VISIT (OUTPATIENT)
Dept: PSYCHIATRY | Facility: CLINIC | Age: 32
End: 2022-12-08
Payer: MEDICAID

## 2022-12-08 ENCOUNTER — PATIENT MESSAGE (OUTPATIENT)
Dept: PSYCHIATRY | Facility: CLINIC | Age: 32
End: 2022-12-08

## 2022-12-08 DIAGNOSIS — F43.23 ADJUSTMENT DISORDER WITH MIXED ANXIETY AND DEPRESSED MOOD: Primary | ICD-10-CM

## 2022-12-08 DIAGNOSIS — F64.0 GENDER DYSPHORIA IN ADULT: ICD-10-CM

## 2022-12-08 DIAGNOSIS — Z65.8 INTERPERSONAL PROBLEM: ICD-10-CM

## 2022-12-08 DIAGNOSIS — F40.10 SOCIAL FEARS: ICD-10-CM

## 2022-12-08 DIAGNOSIS — F90.1 ATTENTION DEFICIT HYPERACTIVITY DISORDER (ADHD), PREDOMINANTLY HYPERACTIVE TYPE: ICD-10-CM

## 2022-12-08 PROCEDURE — 90834 PR PSYCHOTHERAPY W/PATIENT, 45 MIN: ICD-10-PCS | Mod: AH,HB,95, | Performed by: SOCIAL WORKER

## 2022-12-08 PROCEDURE — 1159F PR MEDICATION LIST DOCUMENTED IN MEDICAL RECORD: ICD-10-PCS | Mod: AH,HB,CPTII,95 | Performed by: SOCIAL WORKER

## 2022-12-08 PROCEDURE — 90834 PSYTX W PT 45 MINUTES: CPT | Mod: AH,HB,95, | Performed by: SOCIAL WORKER

## 2022-12-08 PROCEDURE — 1159F MED LIST DOCD IN RCRD: CPT | Mod: AH,HB,CPTII,95 | Performed by: SOCIAL WORKER

## 2023-01-03 NOTE — PROGRESS NOTES
Individual Psychotherapy (PhD/LCSW)    Preferred Name: Fuad   12/8/2022     Telehealth-Virtual Visit  The patient location is: in home  ([place], [place])  The chief complaint leading to consultation is: adjustment broiuq-aijrsxxnglkzn-zfacnl dysphoria-fly dynamics.     Visit type: audiovisual    Face to Face time with patient: 45 min  50- minutes of total time spent on the encounter, which includes face to face time and non-face to face time preparing to see the patient (eg, review of tests), Obtaining and/or reviewing separately obtained history, Documenting clinical information in the electronic or other health record, Independently interpreting results (not separately reported) and communicating results to the patient/family/caregiver, or Care coordination (not separately reported).   Each patient to whom he or she provides medical services by telemedicine is:  (1) informed of the relationship between the physician and patient and the respective role of any other health care provider with respect to management of the patient; and (2) notified that he or she may decline to receive medical services by telemedicine and may withdraw from such care at any time.    Site:  [facility]         Therapeutic Intervention: Met with patient.  Outpatient - Insight oriented psychotherapy 45 min - CPT code 51417    Chief complaint/reason for encounter: depression, anxiety, behavior, interpersonal and transitioning -adjustment-family dynamics.      Interval history and content of current session: Pt was last seen 5-months ago for session. Pt reports she has re-enrolled in school and has been busy with adjusting to being back in school.  Pt continues to think about being a  on the college level. Pt plans to network at [employer] and explore opportunities for teaching assistants.  Pt continues to report she is struggling with a lot of negative self talk and self doubt.  Pt continues to discuss struggling with  "how to find where she "fits in" in her life at the present time however is feeling slightly better about things overall. Pt's long term plan finish college, complete surgeries, and then move out of state.  Pt requested to be seen for medication mgmt in our department and wants to address attention dysregulation symptoms.  Pt discussed how she struggles with poor focus, concentration, and memory recall. Pt states she thinks her symptoms are getting worse.  Clinician encouraged pt to practice new behaviors using behavioral rehearsal-set up organization.     Anxiety:  Intense and repeated verbalizations of anxiety, worry, or fear.   Unusually high level of motor tension such as restlessness, tiredness, shakiness, or muscle tightness.   Hypervigilance on edge, concentration difficulty   Specific fear (rejection from family, friends, & community around her transition) that has generalized to cover a wide area and significantly interferes with daily life and functioning.    Intervention:  Verbalize issues that trigger anxiety and develop healthy alternatives to cope with stressors.  Identify cognitive distortions of belief system that lead to anxious feelings particularly with her family dynamics and her transitions.    Develop ability to change negative self-messages to positive, self affirming messages.   Identify life situations that are stressful and develop a plan that uses problem solving skills to deal with them.   Use relaxation & breathing techniques when feeling overwhelmed.         ADHD:Pattern observed across settings affecting her moderately with impairment  Carelessness and inattention  Cannot sustain attention and struggles with listening especially if it is not something of interest.   Does not follow through in a timely manner with tasks or poor follow through with instructions   Difficulty with organization and dislikes activities that require concentration and sustained effort.   Loses things; easily " distracted forgetful.     Intervention:  Refer for medication mgmt  Behavior modification: clear expectations and limits  Reduce tasks into clear-smaller steps-make lists set reasonable deadlines.   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)  Consider ADHD         Treatment plan:  Target symptoms: gender dysphoria- transitioning-adjustment, gender dysphoria, social anxiety, family dynamics, phase of life, interpersonal relationships, depression, anxiety , adjustment, work stress, ADHD  Why chosen therapy is appropriate versus another modality: relevant to diagnosis, patient responds to this modality, evidence based practice  Outcome monitoring methods: self-report, observation  Therapeutic intervention type: insight oriented psychotherapy, behavior modifying psychotherapy, supportive psychotherapy, interactive psychotherapy    Risk parameters:  Patient reports no suicidal ideation  Patient reports no homicidal ideation  Patient reports no self-injurious behavior  Patient reports no violent behavior    Verbal deficits: None    Patient's response to intervention:  The patient's response to intervention is accepting.    Progress toward goals and other mental status changes:  The patient's progress toward goals is progressing.    Diagnosis:     ICD-10-CM ICD-9-CM   1. Adjustment disorder with mixed anxiety and depressed mood  F43.23 309.28   2. Attention deficit hyperactivity disorder (ADHD), predominantly hyperactive type  F90.1 314.01   3. Interpersonal problem  Z65.8 V62.81   4. Gender dysphoria in adult  F64.0 302.85   5. Social fears  F40.10 300.23       Plan:  individual psychotherapy    Return to clinic: 2 weeks    Length of Service (minutes): 45

## 2023-04-03 ENCOUNTER — LAB VISIT (OUTPATIENT)
Dept: LAB | Facility: HOSPITAL | Age: 33
End: 2023-04-03
Attending: INTERNAL MEDICINE
Payer: MEDICAID

## 2023-04-03 DIAGNOSIS — E03.8 SUBCLINICAL HYPOTHYROIDISM: ICD-10-CM

## 2023-04-03 LAB
ALBUMIN SERPL BCP-MCNC: 3.5 G/DL (ref 3.5–5.2)
ALP SERPL-CCNC: 63 U/L (ref 55–135)
ALT SERPL W/O P-5'-P-CCNC: 15 U/L (ref 10–44)
ANION GAP SERPL CALC-SCNC: 9 MMOL/L (ref 8–16)
AST SERPL-CCNC: 16 U/L (ref 10–40)
BILIRUB SERPL-MCNC: 0.1 MG/DL (ref 0.1–1)
BUN SERPL-MCNC: 31 MG/DL (ref 6–20)
CALCIUM SERPL-MCNC: 9.4 MG/DL (ref 8.7–10.5)
CHLORIDE SERPL-SCNC: 109 MMOL/L (ref 95–110)
CO2 SERPL-SCNC: 19 MMOL/L (ref 23–29)
CREAT SERPL-MCNC: 1.8 MG/DL (ref 0.5–1.4)
EST. GFR  (NO RACE VARIABLE): 51 ML/MIN/1.73 M^2
GLUCOSE SERPL-MCNC: 93 MG/DL (ref 70–110)
POTASSIUM SERPL-SCNC: 5.2 MMOL/L (ref 3.5–5.1)
PROT SERPL-MCNC: 7.1 G/DL (ref 6–8.4)
SODIUM SERPL-SCNC: 137 MMOL/L (ref 136–145)
TSH SERPL DL<=0.005 MIU/L-ACNC: 1.99 UIU/ML (ref 0.4–4)

## 2023-04-03 PROCEDURE — 84443 ASSAY THYROID STIM HORMONE: CPT | Performed by: INTERNAL MEDICINE

## 2023-04-03 PROCEDURE — 80053 COMPREHEN METABOLIC PANEL: CPT | Performed by: INTERNAL MEDICINE

## 2023-04-03 PROCEDURE — 36415 COLL VENOUS BLD VENIPUNCTURE: CPT | Mod: PO | Performed by: INTERNAL MEDICINE

## 2023-04-06 ENCOUNTER — OFFICE VISIT (OUTPATIENT)
Dept: ENDOCRINOLOGY | Facility: CLINIC | Age: 33
End: 2023-04-06
Attending: INTERNAL MEDICINE
Payer: MEDICAID

## 2023-04-06 DIAGNOSIS — R79.89 ELEVATED SERUM CREATININE: ICD-10-CM

## 2023-04-06 DIAGNOSIS — E78.2 MIXED DYSLIPIDEMIA: ICD-10-CM

## 2023-04-06 DIAGNOSIS — E11.319 TYPE 2 DIABETES MELLITUS WITH RETINOPATHY, WITHOUT LONG-TERM CURRENT USE OF INSULIN, MACULAR EDEMA PRESENCE UNSPECIFIED, UNSPECIFIED LATERALITY, UNSPECIFIED RETINOPATHY SEVERITY: ICD-10-CM

## 2023-04-06 DIAGNOSIS — F64.0 TRANSGENDER WOMAN ON HORMONE THERAPY: ICD-10-CM

## 2023-04-06 DIAGNOSIS — Z79.899 TRANSGENDER WOMAN ON HORMONE THERAPY: ICD-10-CM

## 2023-04-06 DIAGNOSIS — E03.8 SUBCLINICAL HYPOTHYROIDISM: ICD-10-CM

## 2023-04-06 DIAGNOSIS — Z85.841 H/O ASTROCYTOMA: ICD-10-CM

## 2023-04-06 DIAGNOSIS — E29.1 TESTICULAR FAILURE: ICD-10-CM

## 2023-04-06 PROCEDURE — 1159F MED LIST DOCD IN RCRD: CPT | Mod: CPTII,95,, | Performed by: INTERNAL MEDICINE

## 2023-04-06 PROCEDURE — 99214 PR OFFICE/OUTPT VISIT, EST, LEVL IV, 30-39 MIN: ICD-10-PCS | Mod: 95,,, | Performed by: INTERNAL MEDICINE

## 2023-04-06 PROCEDURE — 99214 OFFICE O/P EST MOD 30 MIN: CPT | Mod: 95,,, | Performed by: INTERNAL MEDICINE

## 2023-04-06 PROCEDURE — 1159F PR MEDICATION LIST DOCUMENTED IN MEDICAL RECORD: ICD-10-PCS | Mod: CPTII,95,, | Performed by: INTERNAL MEDICINE

## 2023-04-06 PROCEDURE — 1160F PR REVIEW ALL MEDS BY PRESCRIBER/CLIN PHARMACIST DOCUMENTED: ICD-10-PCS | Mod: CPTII,95,, | Performed by: INTERNAL MEDICINE

## 2023-04-06 PROCEDURE — 1160F RVW MEDS BY RX/DR IN RCRD: CPT | Mod: CPTII,95,, | Performed by: INTERNAL MEDICINE

## 2023-04-06 NOTE — PROGRESS NOTES
Subjective:      Patient ID: Miquel Salcedo is a 32 y.o. male.    Chief Complaint:  Gender     History of Present Illness  With regards to her gender incongruence care:    she did change her 's license but is holding off on the name change --  This is due to her estate      Gender identity - female (she /her)   Current Therapy / counselor - yes         Gender Surgeries - none, but interested FFS and GRS      Fertility concerns - not  interested     Started cross hormone therapy:  Dec 2020      Current regimen:   estrogen  2 mg tid- SL    finasteride - she would like to stay on it   Using Rogaine and biotin      Off progesterone  oct 2021 - July 2022      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      She is doing well  No missed doses    + breast growth               In school   [employer]   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     working at [employer]     Single   identifies Pansexual   Mom is aware           Also has T2DM  Diagnosed:  Age 14   - does not recall genetic testing   C-peptide and insulin levels were normal with an elevated glucose   Complications:  DR Eye exam 2017 proliferative  DR OD  Severe non proliferative  DR OS- seeing the retina specialist    No numbness of tingling of her feet      Regimen: none   Was on Amaryl 4 qam  actos 30  insulin in the past          Was On a statin for dyslipidemia  - off      She also was told she has hypothyroidism   We restarted levothyroxine in February of 2022  her current dose is 50 mcg a day     Regarding pituitary:  Has seen NS - Dr Haynes   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  BP Readings from Last 3 Encounters:   09/15/22 126/79   08/18/22 126/79   10/14/21 136/78      Wt Readings from Last 1 Encounters:   09/15/22 1440 56.4 kg (124 lb 3.7 oz)     There is no height or weight on file to calculate BMI.      Physical Exam  Constitutional:       Appearance: Normal appearance.   Psychiatric:         Mood and Affect: Mood normal.         Behavior: Behavior normal.         Thought Content: Thought content normal.         Judgment: Judgment normal.              Lab Review:   Lab Results   Component Value Date    HGBA1C 5.3 06/15/2022     Lab Results   Component Value Date    CHOL 174 02/02/2021    HDL 71 02/02/2021    LDLCALC 81.6 02/02/2021    TRIG 107 02/02/2021    CHOLHDL 40.8 02/02/2021     Lab Results   Component Value Date     04/03/2023    K 5.2 (H) 04/03/2023     04/03/2023    CO2 19 (L) 04/03/2023    GLU 93 04/03/2023    BUN 31 (H) 04/03/2023    CREATININE 1.8 (H) 04/03/2023    CALCIUM 9.4 04/03/2023    PROT 7.1 04/03/2023    ALBUMIN 3.5 04/03/2023    BILITOT 0.1 04/03/2023    ALKPHOS 63 04/03/2023    AST 16 04/03/2023    ALT 15 04/03/2023    ANIONGAP 9 04/03/2023    ESTGFRAFRICA 46.0 (A) 06/15/2022    EGFRNONAA 39.9 (A) 06/15/2022    TSH 1.990 04/03/2023     Vit D, 25-Hydroxy   Date Value Ref Range Status   02/02/2021 31 30 - 96 ng/mL Final     Comment:     Vitamin D deficiency.........<10 ng/mL                              Vitamin D insufficiency......10-29 ng/mL       Vitamin D sufficiency........> or equal to 30 ng/mL  Vitamin D toxicity............>100 ng/mL        Latest Reference Range & Units 10/13/22 12:40   Estradiol See Text pg/mL 174     Assessment and Plan     Transwoman (she/her)      Reviewed goals of therapy   Return to clinic in  6 months         Healthy lifestyle stressed  Discussed increased risk of dvt   Avoid prolonged immobility  D/c ert prior to any procedures           Type 2 diabetes mellitus with retinopathy, without long-term current use of insulin  Controlled  Still undergoing retinopathy treatments  Periodic labs   No evidence of T1DM       Subclinical hypothyroidism  Clinically and biochemically euthyroid.    No changes.    Mixed dyslipidemia  Follow labs     Testicular failure  Ok to stay off aldactone    discussed stopping finasteride but she prefers to stay on it  If ert stopped would need to address bone health     erections are not important to her        H/O astrocytoma    F/u  NS      Elevated serum creatinine  Follow-up nephrology              The patient location is: home  The chief complaint leading to consultation is: gender    Visit type: audiovisual    Face to Face time with patient: 20 minutes of total time spent on the encounter, which includes face to face time and non-face to face time preparing to see the patient (eg, review of tests), Obtaining and/or reviewing separately obtained history, Documenting clinical information in the electronic or other health record, Independently interpreting results (not separately reported) and communicating results to the patient/family/caregiver, or Care coordination (not separately reported).         Each patient to whom he or she provides medical services by telemedicine is:  (1) informed of the relationship between the physician and patient and the respective role of any other health care provider with respect to management of the patient; and (2) notified that he or she may decline to receive medical services by telemedicine and may withdraw from such care at any time.

## 2023-04-06 NOTE — PATIENT INSTRUCTIONS
Thank you for completing a virtual visit with me!     Per our conversation, please continue your current regimen.  We will plan a visit with labs prior in 6 months.  Please remember to take your estrogen after you have your labs drawn.. not before     Please let me know if you have any other questions.    Thank you,  Shruthi Juarez MD

## 2023-04-18 ENCOUNTER — PATIENT MESSAGE (OUTPATIENT)
Dept: PSYCHIATRY | Facility: CLINIC | Age: 33
End: 2023-04-18
Payer: MEDICAID

## 2023-06-14 DIAGNOSIS — F64.0 TRANSGENDER WOMAN ON HORMONE THERAPY: Primary | ICD-10-CM

## 2023-06-14 DIAGNOSIS — Z79.899 TRANSGENDER WOMAN ON HORMONE THERAPY: Primary | ICD-10-CM

## 2023-06-14 RX ORDER — ESTRADIOL 2 MG/1
3 TABLET ORAL 2 TIMES DAILY
Qty: 270 TABLET | Refills: 3 | Status: SHIPPED | OUTPATIENT
Start: 2023-06-14

## 2023-06-19 DIAGNOSIS — E03.8 SUBCLINICAL HYPOTHYROIDISM: Primary | ICD-10-CM

## 2023-06-19 RX ORDER — LEVOTHYROXINE SODIUM 50 UG/1
50 TABLET ORAL
Qty: 90 TABLET | Refills: 3 | Status: SHIPPED | OUTPATIENT
Start: 2023-06-19 | End: 2024-06-18

## 2023-07-02 DIAGNOSIS — Z79.899 TRANSGENDER WOMAN ON HORMONE THERAPY: ICD-10-CM

## 2023-07-02 DIAGNOSIS — F64.0 TRANSGENDER WOMAN ON HORMONE THERAPY: ICD-10-CM

## 2023-07-05 RX ORDER — FINASTERIDE 5 MG/1
TABLET, FILM COATED ORAL
Qty: 30 TABLET | Refills: 11 | Status: SHIPPED | OUTPATIENT
Start: 2023-07-05

## 2024-06-03 ENCOUNTER — OFFICE VISIT (OUTPATIENT)
Dept: OPTOMETRY | Facility: CLINIC | Age: 34
End: 2024-06-03
Payer: COMMERCIAL

## 2024-06-03 DIAGNOSIS — E11.3513 PROLIFERATIVE DIABETIC RETINOPATHY OF BOTH EYES WITH MACULAR EDEMA ASSOCIATED WITH TYPE 2 DIABETES MELLITUS: Primary | ICD-10-CM

## 2024-06-03 DIAGNOSIS — H54.52A1 LOW VISION OF LEFT EYE: ICD-10-CM

## 2024-06-03 DIAGNOSIS — Z98.890 HISTORY OF LASER PHOTOCOAGULATION OF RETINA: ICD-10-CM

## 2024-06-03 PROCEDURE — 2022F DILAT RTA XM EVC RTNOPTHY: CPT | Mod: CPTII,S$GLB,, | Performed by: OPTOMETRIST

## 2024-06-03 PROCEDURE — 99999 PR PBB SHADOW E&M-EST. PATIENT-LVL III: CPT | Mod: PBBFAC,,, | Performed by: OPTOMETRIST

## 2024-06-03 PROCEDURE — 1159F MED LIST DOCD IN RCRD: CPT | Mod: CPTII,S$GLB,, | Performed by: OPTOMETRIST

## 2024-06-03 PROCEDURE — 99203 OFFICE O/P NEW LOW 30 MIN: CPT | Mod: S$GLB,,, | Performed by: OPTOMETRIST

## 2024-06-03 NOTE — PROGRESS NOTES
HPI    Ocular health visit STEFFI 05/10/24 (@ [facility] w Dr. Nicholson)    Pt complains of blurred near vision, pt uses specs prescribed in 2021 but   noticed straining. Pt denies any new floaters and flashes. Pt uses AT PRN.   DM controlled w diet and exercise. Pt sees Dr. Nicholson 2xs a month for   injections.     Agree above  Longstanding PDR w/ PRP and routine injections for DME   Apparent stable VA --OD from previous   ? Optic neuropathy OS       Hemoglobin A1C       Date                     Value               Ref Range             Status                06/15/2022               5.3                 4.0 - 5.6 %           Final                       02/14/2022               5.4                 4.0 - 5.6 %           Final                       10/04/2021               5.2                 4.0 - 5.6 %           Final                     ----------   Last edited by ELICIA Lamb, OD on 6/3/2024 10:25 AM.            Assessment /Plan     For exam results, see Encounter Report.    Proliferative diabetic retinopathy of both eyes with macular edema associated with type 2 diabetes mellitus    Low vision of left eye    History of laser photocoagulation of retina         1. Moderate PDR in both eyes  S/p moderate PRP OU ~ 2019  H/o PRP OU   OS w/ NAION and pallor?      OCT 2021   Moderate to severe PDR w/ gross DME OU  Moderate PRP OU    2,3. Low Rx, gave copy, may consider otc only for increased near vision       Notes 4/25/21  Last visit w/ retina Dr CHASE Fox 1/19/21  Avastin OD at that visit noted at #14  Persistent PDR w/ DME in both eyes       Continue routine f/u care with Dr Nicholson ---recheck w/ me prn

## 2024-06-03 NOTE — PATIENT INSTRUCTIONS
"DRY EYES -- BURNING OR ROSALINO SYMPTOMS:  Use Over The Counter artificial tears as needed for dry eye symptoms.   Some common brands include:  Systane, Optive, Refresh, and Thera-Tears.  These drops can be used as frequently as desired, but may be most helpful use during long periods of concentrated work.  For example, reading / working at the computer. Start with 3-4x per day.     Nighttime Ophthalmic gel or ointments are available: Refresh PM, Genteal, and Lacrilube.    Avoid drops that "get redness out" (Visine, Murine, Clear Eyes), as these may contain medication that could further irritate the eyes, especially with chronic use.    ALLERGY EYES -- ITCHING SYMPTOMS:  Over the counter medications include--Pataday, Zaditor, and Alaway.  Use as directed 1-2 drops daily for symptoms of itching / watering eyes.  These drops will not help for dry eye or exposure symptoms.    REDNESS RELIEF:  Lumify---is a good redness reliever that will not cause irritation if used chronically.        Using the Amsler Grid  If you are at risk for vision loss, you may be told to check your eyesight regularly using the Amsler grid. Below is the grid and instructions for using it.         The Amsler grid helps you track changes in your vision.    How to Use the Amsler Grid  Use the grid in a well-lighted area.  Wear glasses or contact lenses if you usually wear them.  Hold the grid at your normal reading distance (about 16 inches).  Cover your left eye.  With your right eye, look at the dot in the center of the grid.  While looking at the dot, notice if any of the lines look wavy, if any lines disappear, or if the boxes change shape.  Write down on a piece of paper any vision changes from the last time you used the grid.  Now repeat the exercise, this time covering your right eye.  Call your doctor right away if you notice any vision changes.  How Often Should I Check My Vision?  Use the Amsler grid as often as your eye doctor suggests. " Keep the grid where youll remember to use it. Call your eye doctor right away if you notice any changes with your eyesight. This includes if your vision improves.  © 0457-6263 Imelda Bingham, [address] [place] [address], [place] [address]. All rights reserved. This information is not intended as a substitute for professional medical care. Always follow your healthcare professional's instructions.

## 2024-06-04 ENCOUNTER — PATIENT MESSAGE (OUTPATIENT)
Dept: ENDOCRINOLOGY | Facility: CLINIC | Age: 34
End: 2024-06-04
Payer: COMMERCIAL

## 2024-06-21 ENCOUNTER — OFFICE VISIT (OUTPATIENT)
Dept: ENDOCRINOLOGY | Facility: CLINIC | Age: 34
End: 2024-06-21
Attending: INTERNAL MEDICINE
Payer: COMMERCIAL

## 2024-06-21 DIAGNOSIS — Z85.841 H/O ASTROCYTOMA: ICD-10-CM

## 2024-06-21 DIAGNOSIS — E78.2 MIXED DYSLIPIDEMIA: ICD-10-CM

## 2024-06-21 DIAGNOSIS — E29.1 TESTICULAR FAILURE: ICD-10-CM

## 2024-06-21 DIAGNOSIS — R79.89 ELEVATED SERUM CREATININE: ICD-10-CM

## 2024-06-21 DIAGNOSIS — F64.0 TRANSGENDER WOMAN ON HORMONE THERAPY: Primary | ICD-10-CM

## 2024-06-21 DIAGNOSIS — E11.319 TYPE 2 DIABETES MELLITUS WITH RETINOPATHY, WITHOUT LONG-TERM CURRENT USE OF INSULIN, MACULAR EDEMA PRESENCE UNSPECIFIED, UNSPECIFIED LATERALITY, UNSPECIFIED RETINOPATHY SEVERITY: ICD-10-CM

## 2024-06-21 DIAGNOSIS — E03.8 SUBCLINICAL HYPOTHYROIDISM: ICD-10-CM

## 2024-06-21 DIAGNOSIS — Z79.899 TRANSGENDER WOMAN ON HORMONE THERAPY: Primary | ICD-10-CM

## 2024-06-21 NOTE — PATIENT INSTRUCTIONS
Thank you for completing a virtual visit with me!     Per our conversation, We will be fasting blood work soon. Please remember to take the estrogen after the labs are drawn, not before.  Also, please establish care with a nephrologist.    If labs look OK, we will see you back in a year unless you need me prior    Please let me know if you have any other questions.    Thank you,  Shruthi Juarez MD

## 2024-06-21 NOTE — ASSESSMENT & PLAN NOTE
Labs asap     Reviewed goals of therapy   If labs are ok, Return to clinic in  12 months         Healthy lifestyle stressed  Discussed increased risk of dvt   Avoid prolonged immobility  D/c ert prior to any procedures

## 2024-06-21 NOTE — PROGRESS NOTES
Subjective:      Patient ID: Miquel Salcedo is a 33 y.o. male.    Chief Complaint:  Gender     History of Present Illness  With regards to her gender incongruence care:    she did change her 's license but is holding off on the name change --  This is due to her estate      Gender identity - female (she /her)   Current Therapy / counselor - yes         Gender Surgeries - none, but interested FFS, BA and GRS      Fertility concerns - not  interested     Started cross hormone therapy:  Dec 2020      Current regimen:   estrogen  2 mg tid- SL    finasteride - she would like to stay on it   Using Rogaine, minoxidil  and biotin      Off progesterone  oct 2021 - July 2022      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      She is doing well  No missed doses    + breast growth               In school on/off [employer]   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     Single   identifies Pansexual   Mom is aware           Also has T2DM  Diagnosed:  Age 14   - does not recall genetic testing   C-peptide and insulin levels were normal with an elevated glucose   Complications:  DR Eye exam 2017 proliferative  DR OD  Severe non proliferative  DR OS- seeing the retina specialist  - getting monthly injections   No numbness of tingling of her feet      Regimen: none   Was on Amaryl 4 qam  actos 30  insulin in the past          Was On a statin for dyslipidemia  - off      She also was told she has hypothyroidism   We restarted levothyroxine in February of 2022  her current dose is 50 mcg a day     Regarding pituitary:  Has seen NS - Dr Haynes   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  BP Readings from Last 3 Encounters:   09/15/22 126/79   08/18/22 126/79   10/14/21 136/78     Wt Readings from Last 1 Encounters:   09/15/22 1440 56.4 kg (124 lb 3.7 oz)     There is no height or weight on file to calculate BMI.      Physical Exam  Constitutional:       Appearance: Normal appearance.   Psychiatric:         Mood and Affect: Mood normal.         Behavior: Behavior normal.         Thought Content: Thought content normal.         Judgment: Judgment normal.                Lab Review:   Lab Results   Component Value Date    HGBA1C 5.3 06/15/2022     Lab Results   Component Value Date    CHOL 174 02/02/2021    HDL 71 02/02/2021    LDLCALC 81.6 02/02/2021    TRIG 107 02/02/2021    CHOLHDL 40.8 02/02/2021     Lab Results   Component Value Date     04/03/2023    K 5.2 (H) 04/03/2023     04/03/2023    CO2 19 (L) 04/03/2023    GLU 93 04/03/2023    BUN 31 (H) 04/03/2023    CREATININE 1.8 (H) 04/03/2023    CALCIUM 9.4 04/03/2023    PROT 7.1 04/03/2023    ALBUMIN 3.5 04/03/2023    BILITOT 0.1 04/03/2023    ALKPHOS 63 04/03/2023    AST 16 04/03/2023    ALT 15 04/03/2023    ANIONGAP 9 04/03/2023    ESTGFRAFRICA 46.0 (A) 06/15/2022    EGFRNONAA 39.9 (A) 06/15/2022    TSH 1.990 04/03/2023     Vit D, 25-Hydroxy   Date Value Ref Range Status   02/02/2021 31 30 - 96 ng/mL Final     Comment:     Vitamin D deficiency.........<10 ng/mL                              Vitamin D insufficiency......10-29 ng/mL       Vitamin D sufficiency........> or equal to 30 ng/mL  Vitamin D toxicity............>100 ng/mL         Assessment and Plan     Transwoman (she/her)    Labs asap     Reviewed goals of therapy   If labs are ok, Return to clinic in  12 months         Healthy lifestyle stressed  Discussed increased risk of dvt   Avoid prolonged immobility  D/c ert prior to any procedures           Type 2 diabetes mellitus with retinopathy, without long-term current use of insulin  Controlled  Still undergoing  retinopathy treatments  Periodic labs   No evidence of T1DM      Subclinical hypothyroidism  Check tsh   Goal is nl    No changes.    Mixed dyslipidemia  Follow labs     Testicular failure  Ok to stay off aldactone    discussed stopping finasteride but she prefers to stay on it  If ert stopped would need to address bone health     erections are not important to her        H/O astrocytoma    F/u  NS      Elevated serum creatinine  Labs asap   Urged to re-establish care asap                The patient location is: home  The chief complaint leading to consultation is: gender    Visit type: audiovisual    Face to Face time with patient: 20 minutes of total time spent on the encounter, which includes face to face time and non-face to face time preparing to see the patient (eg, review of tests), Obtaining and/or reviewing separately obtained history, Documenting clinical information in the electronic or other health record, Independently interpreting results (not separately reported) and communicating results to the patient/family/caregiver, or Care coordination (not separately reported).         Each patient to whom he or she provides medical services by telemedicine is:  (1) informed of the relationship between the physician and patient and the respective role of any other health care provider with respect to management of the patient; and (2) notified that he or she may decline to receive medical services by telemedicine and may withdraw from such care at any time.

## 2024-06-24 ENCOUNTER — PATIENT MESSAGE (OUTPATIENT)
Dept: NEPHROLOGY | Facility: CLINIC | Age: 34
End: 2024-06-24
Payer: COMMERCIAL

## 2024-06-24 ENCOUNTER — TELEPHONE (OUTPATIENT)
Dept: NEPHROLOGY | Facility: CLINIC | Age: 34
End: 2024-06-24
Payer: COMMERCIAL

## 2024-06-24 ENCOUNTER — LAB VISIT (OUTPATIENT)
Dept: LAB | Facility: HOSPITAL | Age: 34
End: 2024-06-24
Attending: INTERNAL MEDICINE
Payer: COMMERCIAL

## 2024-06-24 DIAGNOSIS — E11.319 TYPE 2 DIABETES MELLITUS WITH RETINOPATHY, WITHOUT LONG-TERM CURRENT USE OF INSULIN, MACULAR EDEMA PRESENCE UNSPECIFIED, UNSPECIFIED LATERALITY, UNSPECIFIED RETINOPATHY SEVERITY: ICD-10-CM

## 2024-06-24 DIAGNOSIS — E03.8 SUBCLINICAL HYPOTHYROIDISM: ICD-10-CM

## 2024-06-24 DIAGNOSIS — E78.2 MIXED DYSLIPIDEMIA: ICD-10-CM

## 2024-06-24 DIAGNOSIS — R79.89 ELEVATED SERUM CREATININE: ICD-10-CM

## 2024-06-24 LAB
25(OH)D3+25(OH)D2 SERPL-MCNC: 32 NG/ML (ref 30–96)
ALBUMIN SERPL BCP-MCNC: 3.3 G/DL (ref 3.5–5.2)
ALP SERPL-CCNC: 56 U/L (ref 55–135)
ALT SERPL W/O P-5'-P-CCNC: 12 U/L (ref 10–44)
ANION GAP SERPL CALC-SCNC: 12 MMOL/L (ref 8–16)
AST SERPL-CCNC: 11 U/L (ref 10–40)
BILIRUB SERPL-MCNC: 0.1 MG/DL (ref 0.1–1)
BUN SERPL-MCNC: 41 MG/DL (ref 6–20)
CALCIUM SERPL-MCNC: 9.6 MG/DL (ref 8.7–10.5)
CHLORIDE SERPL-SCNC: 110 MMOL/L (ref 95–110)
CHOLEST SERPL-MCNC: 215 MG/DL (ref 120–199)
CHOLEST/HDLC SERPL: 2.9 {RATIO} (ref 2–5)
CO2 SERPL-SCNC: 17 MMOL/L (ref 23–29)
CREAT SERPL-MCNC: 2.1 MG/DL (ref 0.5–1.4)
ERYTHROCYTE [DISTWIDTH] IN BLOOD BY AUTOMATED COUNT: 13.8 % (ref 11.5–14.5)
EST. GFR  (NO RACE VARIABLE): 42.1 ML/MIN/1.73 M^2
ESTIMATED AVG GLUCOSE: 114 MG/DL (ref 68–131)
ESTRADIOL SERPL-MCNC: 516 PG/ML (ref 11–44)
GLUCOSE SERPL-MCNC: 89 MG/DL (ref 70–110)
HBA1C MFR BLD: 5.6 % (ref 4–5.6)
HCT VFR BLD AUTO: 26 % (ref 40–54)
HDLC SERPL-MCNC: 74 MG/DL (ref 40–75)
HDLC SERPL: 34.4 % (ref 20–50)
HGB BLD-MCNC: 8.1 G/DL (ref 14–18)
LDLC SERPL CALC-MCNC: 105.6 MG/DL (ref 63–159)
MCH RBC QN AUTO: 28 PG (ref 27–31)
MCHC RBC AUTO-ENTMCNC: 31.2 G/DL (ref 32–36)
MCV RBC AUTO: 90 FL (ref 82–98)
NONHDLC SERPL-MCNC: 141 MG/DL
PLATELET # BLD AUTO: 298 K/UL (ref 150–450)
PMV BLD AUTO: 10.7 FL (ref 9.2–12.9)
POTASSIUM SERPL-SCNC: 4.9 MMOL/L (ref 3.5–5.1)
PROT SERPL-MCNC: 7.2 G/DL (ref 6–8.4)
PTH-INTACT SERPL-MCNC: 178.1 PG/ML (ref 9–77)
RBC # BLD AUTO: 2.89 M/UL (ref 4.6–6.2)
SODIUM SERPL-SCNC: 139 MMOL/L (ref 136–145)
T4 FREE SERPL-MCNC: 0.8 NG/DL (ref 0.71–1.51)
TESTOST SERPL-MCNC: 36 NG/DL (ref 304–1227)
TRIGL SERPL-MCNC: 177 MG/DL (ref 30–150)
TSH SERPL DL<=0.005 MIU/L-ACNC: 5.48 UIU/ML (ref 0.4–4)
WBC # BLD AUTO: 8.54 K/UL (ref 3.9–12.7)

## 2024-06-24 PROCEDURE — 83970 ASSAY OF PARATHORMONE: CPT | Performed by: INTERNAL MEDICINE

## 2024-06-24 PROCEDURE — 84443 ASSAY THYROID STIM HORMONE: CPT | Performed by: INTERNAL MEDICINE

## 2024-06-24 PROCEDURE — 36415 COLL VENOUS BLD VENIPUNCTURE: CPT | Mod: PO | Performed by: INTERNAL MEDICINE

## 2024-06-24 PROCEDURE — 83036 HEMOGLOBIN GLYCOSYLATED A1C: CPT | Performed by: INTERNAL MEDICINE

## 2024-06-24 PROCEDURE — 85027 COMPLETE CBC AUTOMATED: CPT | Performed by: INTERNAL MEDICINE

## 2024-06-24 PROCEDURE — 80061 LIPID PANEL: CPT | Performed by: INTERNAL MEDICINE

## 2024-06-24 PROCEDURE — 82306 VITAMIN D 25 HYDROXY: CPT | Performed by: INTERNAL MEDICINE

## 2024-06-24 PROCEDURE — 82670 ASSAY OF TOTAL ESTRADIOL: CPT | Performed by: INTERNAL MEDICINE

## 2024-06-24 PROCEDURE — 84403 ASSAY OF TOTAL TESTOSTERONE: CPT | Performed by: INTERNAL MEDICINE

## 2024-06-24 PROCEDURE — 84439 ASSAY OF FREE THYROXINE: CPT | Performed by: INTERNAL MEDICINE

## 2024-06-24 PROCEDURE — 80053 COMPREHEN METABOLIC PANEL: CPT | Performed by: INTERNAL MEDICINE

## 2024-06-25 ENCOUNTER — PATIENT MESSAGE (OUTPATIENT)
Dept: ENDOCRINOLOGY | Facility: CLINIC | Age: 34
End: 2024-06-25
Payer: COMMERCIAL

## 2024-06-25 DIAGNOSIS — E03.8 SUBCLINICAL HYPOTHYROIDISM: Primary | ICD-10-CM

## 2024-07-02 DIAGNOSIS — E03.8 SUBCLINICAL HYPOTHYROIDISM: ICD-10-CM

## 2024-07-02 RX ORDER — LEVOTHYROXINE SODIUM 50 UG/1
50 TABLET ORAL
Qty: 90 TABLET | Refills: 3 | Status: SHIPPED | OUTPATIENT
Start: 2024-07-02

## 2024-07-09 DIAGNOSIS — F64.0 TRANSGENDER WOMAN ON HORMONE THERAPY: ICD-10-CM

## 2024-07-09 DIAGNOSIS — Z79.899 TRANSGENDER WOMAN ON HORMONE THERAPY: ICD-10-CM

## 2024-07-09 RX ORDER — FINASTERIDE 5 MG/1
5 TABLET, FILM COATED ORAL DAILY
Qty: 90 TABLET | Refills: 3 | Status: SHIPPED | OUTPATIENT
Start: 2024-07-09

## 2024-07-09 RX ORDER — ESTRADIOL 2 MG/1
3 TABLET ORAL 2 TIMES DAILY
Qty: 270 TABLET | Refills: 3 | Status: SHIPPED | OUTPATIENT
Start: 2024-07-09

## 2024-08-07 ENCOUNTER — OFFICE VISIT (OUTPATIENT)
Dept: NEPHROLOGY | Facility: CLINIC | Age: 34
End: 2024-08-07
Payer: COMMERCIAL

## 2024-08-07 VITALS — DIASTOLIC BLOOD PRESSURE: 70 MMHG | SYSTOLIC BLOOD PRESSURE: 144 MMHG

## 2024-08-07 DIAGNOSIS — N18.32 STAGE 3B CHRONIC KIDNEY DISEASE: Primary | ICD-10-CM

## 2024-08-07 DIAGNOSIS — D63.1 ANEMIA IN STAGE 3B CHRONIC KIDNEY DISEASE: ICD-10-CM

## 2024-08-07 DIAGNOSIS — N18.32 CKD STAGE 3B, GFR 30-44 ML/MIN: ICD-10-CM

## 2024-08-07 DIAGNOSIS — E87.20 METABOLIC ACIDOSIS: ICD-10-CM

## 2024-08-07 DIAGNOSIS — N25.81 SECONDARY HYPERPARATHYROIDISM OF RENAL ORIGIN: ICD-10-CM

## 2024-08-07 DIAGNOSIS — N18.32 ANEMIA IN STAGE 3B CHRONIC KIDNEY DISEASE: ICD-10-CM

## 2024-08-07 PROCEDURE — 99999 PR PBB SHADOW E&M-EST. PATIENT-LVL III: CPT | Mod: PBBFAC,,, | Performed by: INTERNAL MEDICINE

## 2024-08-07 RX ORDER — MINOXIDIL 2.5 MG/1
2.5 TABLET ORAL
COMMUNITY
Start: 2024-07-08

## 2024-08-09 PROBLEM — E87.20 METABOLIC ACIDOSIS: Status: ACTIVE | Noted: 2024-08-09

## 2024-08-09 PROBLEM — D63.1 ANEMIA IN CHRONIC KIDNEY DISEASE (CKD): Status: ACTIVE | Noted: 2024-08-09

## 2024-08-09 PROBLEM — N18.9 ANEMIA IN CHRONIC KIDNEY DISEASE (CKD): Status: ACTIVE | Noted: 2024-08-09

## 2024-08-09 PROBLEM — N18.32 CKD STAGE 3B, GFR 30-44 ML/MIN: Status: ACTIVE | Noted: 2024-08-09

## 2024-11-05 ENCOUNTER — HOSPITAL ENCOUNTER (OUTPATIENT)
Dept: RADIOLOGY | Facility: HOSPITAL | Age: 34
Discharge: HOME | End: 2024-11-05
Attending: INTERNAL MEDICINE
Payer: COMMERCIAL

## 2024-11-05 DIAGNOSIS — N18.32 STAGE 3B CHRONIC KIDNEY DISEASE: ICD-10-CM

## 2024-11-05 PROCEDURE — 76770 US EXAM ABDO BACK WALL COMP: CPT | Mod: 26,,, | Performed by: RADIOLOGY

## 2024-11-05 PROCEDURE — 76770 US EXAM ABDO BACK WALL COMP: CPT | Mod: TC,PO

## 2024-11-12 ENCOUNTER — OFFICE VISIT (OUTPATIENT)
Dept: NEPHROLOGY | Facility: CLINIC | Age: 34
End: 2024-11-12
Payer: COMMERCIAL

## 2024-11-12 VITALS
OXYGEN SATURATION: 100 % | HEART RATE: 92 BPM | HEIGHT: 60 IN | BODY MASS INDEX: 25.09 KG/M2 | DIASTOLIC BLOOD PRESSURE: 80 MMHG | SYSTOLIC BLOOD PRESSURE: 158 MMHG

## 2024-11-12 DIAGNOSIS — D63.1 ANEMIA IN STAGE 3B CHRONIC KIDNEY DISEASE: ICD-10-CM

## 2024-11-12 DIAGNOSIS — E87.20 METABOLIC ACIDOSIS: ICD-10-CM

## 2024-11-12 DIAGNOSIS — I10 HYPERTENSION, UNSPECIFIED TYPE: ICD-10-CM

## 2024-11-12 DIAGNOSIS — N18.32 CKD STAGE 3B, GFR 30-44 ML/MIN: Primary | ICD-10-CM

## 2024-11-12 DIAGNOSIS — N18.32 ANEMIA IN STAGE 3B CHRONIC KIDNEY DISEASE: ICD-10-CM

## 2024-11-12 DIAGNOSIS — N25.81 SECONDARY HYPERPARATHYROIDISM OF RENAL ORIGIN: ICD-10-CM

## 2024-11-12 PROCEDURE — 1160F RVW MEDS BY RX/DR IN RCRD: CPT | Mod: CPTII,S$GLB,, | Performed by: INTERNAL MEDICINE

## 2024-11-12 PROCEDURE — 3060F POS MICROALBUMINURIA REV: CPT | Mod: CPTII,S$GLB,, | Performed by: INTERNAL MEDICINE

## 2024-11-12 PROCEDURE — 3066F NEPHROPATHY DOC TX: CPT | Mod: CPTII,S$GLB,, | Performed by: INTERNAL MEDICINE

## 2024-11-12 PROCEDURE — 3079F DIAST BP 80-89 MM HG: CPT | Mod: CPTII,S$GLB,, | Performed by: INTERNAL MEDICINE

## 2024-11-12 PROCEDURE — 99999 PR PBB SHADOW E&M-EST. PATIENT-LVL III: CPT | Mod: PBBFAC,,, | Performed by: INTERNAL MEDICINE

## 2024-11-12 PROCEDURE — 1159F MED LIST DOCD IN RCRD: CPT | Mod: CPTII,S$GLB,, | Performed by: INTERNAL MEDICINE

## 2024-11-12 PROCEDURE — 3008F BODY MASS INDEX DOCD: CPT | Mod: CPTII,S$GLB,, | Performed by: INTERNAL MEDICINE

## 2024-11-12 PROCEDURE — 99214 OFFICE O/P EST MOD 30 MIN: CPT | Mod: S$GLB,,, | Performed by: INTERNAL MEDICINE

## 2024-11-12 PROCEDURE — 3044F HG A1C LEVEL LT 7.0%: CPT | Mod: CPTII,S$GLB,, | Performed by: INTERNAL MEDICINE

## 2024-11-12 PROCEDURE — 3077F SYST BP >= 140 MM HG: CPT | Mod: CPTII,S$GLB,, | Performed by: INTERNAL MEDICINE

## 2024-11-12 RX ORDER — SODIUM BICARBONATE 650 MG/1
650 TABLET ORAL 2 TIMES DAILY
Qty: 60 TABLET | Refills: 11 | Status: SHIPPED | OUTPATIENT
Start: 2024-11-12 | End: 2025-11-12

## 2024-11-12 RX ORDER — CALCITRIOL 0.25 UG/1
0.25 CAPSULE ORAL DAILY
Qty: 90 CAPSULE | Refills: 1 | Status: SHIPPED | OUTPATIENT
Start: 2024-11-12

## 2024-11-12 RX ORDER — LOSARTAN POTASSIUM 25 MG/1
25 TABLET ORAL DAILY
Qty: 30 TABLET | Refills: 5 | Status: SHIPPED | OUTPATIENT
Start: 2024-11-12 | End: 2025-05-11

## 2024-11-12 NOTE — PATIENT INSTRUCTIONS
Start the below medications:  Losartan 25mg daily  Sodium bicarbonate 650mg twice per day  Calcitriol 0.25mcg daily

## 2024-11-12 NOTE — PROGRESS NOTES
"Subjective:        Patient ID: Miquel Salcedo is a 33 y.o. who presents for return patient evaluation for chronic renal failure.    No major issues since last visit. Taking minoxidil for hair loss sporadically.  A1c 5.6 (previously poorly controlled)    Review of Systems   Constitutional:  Negative for chills, diaphoresis and fever.   Respiratory:  Negative for cough and shortness of breath.    Cardiovascular:  Negative for chest pain and leg swelling.   Gastrointestinal:  Negative for abdominal pain, diarrhea, nausea and vomiting.   Genitourinary:  Negative for difficulty urinating, dysuria and hematuria.   Musculoskeletal:  Negative for myalgias.   Neurological:  Negative for headaches.   Hematological:  Does not bruise/bleed easily.         The past medical, family and social histories were reviewed for this encounter.     BP (!) 158/80 (BP Location: Left arm, Patient Position: Sitting)   Pulse 92   Ht 4' 11" (1.499 m)   SpO2 100%   BMI 25.09 kg/m²     Objective:      Physical Exam  Vitals reviewed.   Constitutional:       General: She is not in acute distress.     Appearance: She is well-developed.   HENT:      Head: Normocephalic and atraumatic.   Eyes:      General: No scleral icterus.     Conjunctiva/sclera: Conjunctivae normal.   Neck:      Vascular: No JVD.   Cardiovascular:      Rate and Rhythm: Normal rate and regular rhythm.      Heart sounds: Normal heart sounds. No murmur heard.     No friction rub. No gallop.   Pulmonary:      Effort: Pulmonary effort is normal. No respiratory distress.      Breath sounds: Normal breath sounds. No wheezing.   Abdominal:      General: Bowel sounds are normal. There is no distension.      Palpations: Abdomen is soft.      Tenderness: There is no abdominal tenderness.   Musculoskeletal:      Right lower leg: No edema.      Left lower leg: No edema.   Skin:     General: Skin is warm and dry.      Findings: No rash.   Neurological:      Mental Status: She is " alert and oriented to person, place, and time.         Assessment:       1. CKD stage 3b, GFR 30-44 ml/min    2. Metabolic acidosis    3. Anemia in stage 3b chronic kidney disease    4. Hypertension, unspecified type    5. Secondary hyperparathyroidism of renal origin          Lab Results   Component Value Date    CREATININE 2.5 (H) 11/05/2024    BUN 55 (H) 11/05/2024     (L) 11/05/2024    K 5.0 11/05/2024     11/05/2024    CO2 16 (L) 11/05/2024     Lab Results   Component Value Date    .4 (H) 11/05/2024    CALCIUM 9.0 11/05/2024    PHOS 3.3 11/05/2024     Lab Results   Component Value Date    HCT 26.0 (L) 06/24/2024     Prot/Creat Ratio, Urine   Date Value Ref Range Status   11/05/2024 0.37 (H) 0.00 - 0.20 Final      Plan:   Return to clinic in 2 months.  Labs for next visit include rfp, pth, upc, iron studies.  Baseline creatinine is 1.8-2.1 since 2022. No urine studies available. CKD clinically due to diabetic nephropathy given patient also has retinopathy.   Bicarb 16, start sodium bicarbonate  , Ca 9.0. Start calcitriol  Hct 26, check iron studies.  BP uncontrolled, will start on losartan and see back soon to ensure stability of renal function and potassium    KFRE 2-Year: 9.1% at 11/5/2024 11:09 AM  Calculated from:  Serum Creatinine: 2.5 mg/dL at 11/5/2024  9:43 AM  Urine Albumin Creatinine Ratio: 232.0 ug/mg at 11/5/2024 11:09 AM  Age: 33 years  Sex: Male at 11/5/2024 11:09 AM  Has CKD-3 to CKD-5: Yes    KFRE 5-Year: 25.8% at 11/5/2024 11:09 AM  Calculated from:  Serum Creatinine: 2.5 mg/dL at 11/5/2024  9:43 AM  Urine Albumin Creatinine Ratio: 232.0 ug/mg at 11/5/2024 11:09 AM  Age: 33 years  Sex: Male at 11/5/2024 11:09 AM  Has CKD-3 to CKD-5: Yes

## 2025-01-09 ENCOUNTER — LAB VISIT (OUTPATIENT)
Dept: LAB | Facility: HOSPITAL | Age: 35
End: 2025-01-09
Attending: NURSE PRACTITIONER
Payer: COMMERCIAL

## 2025-01-09 DIAGNOSIS — N18.32 STAGE 3B CHRONIC KIDNEY DISEASE: ICD-10-CM

## 2025-01-09 LAB
ALBUMIN SERPL BCP-MCNC: 2.9 G/DL (ref 3.5–5.2)
ANION GAP SERPL CALC-SCNC: 11 MMOL/L (ref 8–16)
BUN SERPL-MCNC: 30 MG/DL (ref 6–20)
CALCIUM SERPL-MCNC: 8.6 MG/DL (ref 8.7–10.5)
CHLORIDE SERPL-SCNC: 107 MMOL/L (ref 95–110)
CO2 SERPL-SCNC: 20 MMOL/L (ref 23–29)
CREAT SERPL-MCNC: 2.1 MG/DL (ref 0.5–1.4)
CREAT UR-MCNC: 56 MG/DL (ref 23–375)
EST. GFR  (NO RACE VARIABLE): 41.8 ML/MIN/1.73 M^2
FERRITIN SERPL-MCNC: 94 NG/ML (ref 20–300)
GLUCOSE SERPL-MCNC: 105 MG/DL (ref 70–110)
IRON SERPL-MCNC: 42 UG/DL (ref 45–160)
PHOSPHATE SERPL-MCNC: 2.7 MG/DL (ref 2.7–4.5)
POTASSIUM SERPL-SCNC: 4.1 MMOL/L (ref 3.5–5.1)
PROT UR-MCNC: 59 MG/DL (ref 0–15)
PROT/CREAT UR: 1.05 MG/G{CREAT} (ref 0–0.2)
PTH-INTACT SERPL-MCNC: 149.3 PG/ML (ref 9–77)
SATURATED IRON: 9 % (ref 20–50)
SODIUM SERPL-SCNC: 138 MMOL/L (ref 136–145)
TOTAL IRON BINDING CAPACITY: 463 UG/DL (ref 250–450)
TRANSFERRIN SERPL-MCNC: 313 MG/DL (ref 200–375)

## 2025-01-09 PROCEDURE — 84156 ASSAY OF PROTEIN URINE: CPT | Performed by: INTERNAL MEDICINE

## 2025-01-09 PROCEDURE — 80069 RENAL FUNCTION PANEL: CPT | Performed by: INTERNAL MEDICINE

## 2025-01-09 PROCEDURE — 84466 ASSAY OF TRANSFERRIN: CPT | Performed by: INTERNAL MEDICINE

## 2025-01-09 PROCEDURE — 82728 ASSAY OF FERRITIN: CPT | Performed by: INTERNAL MEDICINE

## 2025-01-09 PROCEDURE — 83970 ASSAY OF PARATHORMONE: CPT | Performed by: INTERNAL MEDICINE

## 2025-01-09 PROCEDURE — 36415 COLL VENOUS BLD VENIPUNCTURE: CPT | Mod: PO | Performed by: INTERNAL MEDICINE

## 2025-01-14 ENCOUNTER — OFFICE VISIT (OUTPATIENT)
Dept: NEPHROLOGY | Facility: CLINIC | Age: 35
End: 2025-01-14
Payer: COMMERCIAL

## 2025-01-14 VITALS
HEART RATE: 107 BPM | SYSTOLIC BLOOD PRESSURE: 188 MMHG | BODY MASS INDEX: 25.09 KG/M2 | OXYGEN SATURATION: 99 % | DIASTOLIC BLOOD PRESSURE: 84 MMHG | HEIGHT: 60 IN

## 2025-01-14 DIAGNOSIS — N18.32 ANEMIA IN STAGE 3B CHRONIC KIDNEY DISEASE: ICD-10-CM

## 2025-01-14 DIAGNOSIS — N18.32 CKD STAGE 3B, GFR 30-44 ML/MIN: Primary | ICD-10-CM

## 2025-01-14 DIAGNOSIS — D63.1 ANEMIA IN STAGE 3B CHRONIC KIDNEY DISEASE: ICD-10-CM

## 2025-01-14 DIAGNOSIS — E87.20 METABOLIC ACIDOSIS: ICD-10-CM

## 2025-01-14 DIAGNOSIS — I10 HYPERTENSION, UNSPECIFIED TYPE: ICD-10-CM

## 2025-01-14 DIAGNOSIS — N25.81 SECONDARY HYPERPARATHYROIDISM OF RENAL ORIGIN: ICD-10-CM

## 2025-01-14 PROCEDURE — 3077F SYST BP >= 140 MM HG: CPT | Mod: CPTII,S$GLB,, | Performed by: INTERNAL MEDICINE

## 2025-01-14 PROCEDURE — 1159F MED LIST DOCD IN RCRD: CPT | Mod: CPTII,S$GLB,, | Performed by: INTERNAL MEDICINE

## 2025-01-14 PROCEDURE — 3079F DIAST BP 80-89 MM HG: CPT | Mod: CPTII,S$GLB,, | Performed by: INTERNAL MEDICINE

## 2025-01-14 PROCEDURE — 3008F BODY MASS INDEX DOCD: CPT | Mod: CPTII,S$GLB,, | Performed by: INTERNAL MEDICINE

## 2025-01-14 PROCEDURE — 3066F NEPHROPATHY DOC TX: CPT | Mod: CPTII,S$GLB,, | Performed by: INTERNAL MEDICINE

## 2025-01-14 PROCEDURE — 99999 PR PBB SHADOW E&M-EST. PATIENT-LVL III: CPT | Mod: PBBFAC,,, | Performed by: INTERNAL MEDICINE

## 2025-01-14 PROCEDURE — 99214 OFFICE O/P EST MOD 30 MIN: CPT | Mod: S$GLB,,, | Performed by: INTERNAL MEDICINE

## 2025-01-14 RX ORDER — MINOXIDIL 2.5 MG/1
2.5 TABLET ORAL 2 TIMES DAILY
Qty: 60 TABLET | Refills: 2 | Status: SHIPPED | OUTPATIENT
Start: 2025-01-14 | End: 2025-04-14

## 2025-01-14 RX ORDER — FERROUS SULFATE 325(65) MG
325 TABLET ORAL 2 TIMES DAILY
Qty: 180 TABLET | Refills: 0 | Status: SHIPPED | OUTPATIENT
Start: 2025-01-14 | End: 2025-04-14

## 2025-01-14 NOTE — PROGRESS NOTES
"Subjective:        Patient ID: Miquel Salcedo is a 34 y.o. who presents for return patient evaluation for chronic renal failure.    No major issues since last visit. Ran out of minoxidil for past month.    Review of Systems   Constitutional:  Negative for chills, diaphoresis and fever.   Respiratory:  Negative for cough and shortness of breath.    Cardiovascular:  Negative for chest pain and leg swelling.   Gastrointestinal:  Negative for abdominal pain, diarrhea, nausea and vomiting.   Genitourinary:  Negative for difficulty urinating, dysuria and hematuria.   Musculoskeletal:  Negative for myalgias.   Neurological:  Negative for headaches.   Hematological:  Does not bruise/bleed easily.       The past medical, family and social histories were reviewed for this encounter.     BP (!) 188/84 (BP Location: Left arm, Patient Position: Sitting)   Pulse 107   Ht 4' 11" (1.499 m)   SpO2 99%   BMI 25.09 kg/m²     Objective:      Physical Exam  Vitals reviewed.   Constitutional:       General: She is not in acute distress.     Appearance: She is well-developed.   HENT:      Head: Normocephalic and atraumatic.   Eyes:      General: No scleral icterus.     Conjunctiva/sclera: Conjunctivae normal.   Neck:      Vascular: No JVD.   Cardiovascular:      Rate and Rhythm: Normal rate and regular rhythm.      Heart sounds: Normal heart sounds. No murmur heard.     No friction rub. No gallop.   Pulmonary:      Effort: Pulmonary effort is normal. No respiratory distress.      Breath sounds: Normal breath sounds. No wheezing.   Abdominal:      General: Bowel sounds are normal. There is no distension.      Palpations: Abdomen is soft.      Tenderness: There is no abdominal tenderness.   Musculoskeletal:      Right lower leg: No edema.      Left lower leg: No edema.   Skin:     General: Skin is warm and dry.      Findings: No rash.   Neurological:      Mental Status: She is alert and oriented to person, place, and time.     "   Assessment:       1. CKD stage 3b, GFR 30-44 ml/min    2. Anemia in stage 3b chronic kidney disease    3. Hypertension, unspecified type    4. Metabolic acidosis    5. Secondary hyperparathyroidism of renal origin            Lab Results   Component Value Date    CREATININE 2.1 (H) 01/09/2025    BUN 30 (H) 01/09/2025     01/09/2025    K 4.1 01/09/2025     01/09/2025    CO2 20 (L) 01/09/2025     Lab Results   Component Value Date    .3 (H) 01/09/2025    CALCIUM 8.6 (L) 01/09/2025    PHOS 2.7 01/09/2025     Lab Results   Component Value Date    HCT 26.0 (L) 06/24/2024     Prot/Creat Ratio, Urine   Date Value Ref Range Status   01/09/2025 1.05 (H) 0.00 - 0.20 Final   11/05/2024 0.37 (H) 0.00 - 0.20 Final      Plan:   Return to clinic in 3 months.  Labs for next visit include rfp, pth, upc, iron studies.  Baseline creatinine is 1.8-2.1 since 2022. UPC 1.05. CKD clinically due to diabetic nephropathy given patient also has retinopathy.   Bicarb 20, improved on sodium bicarbonate  , Ca 8.6, continue calcitriol  Hct 26, Tsat 9, ferritin 94, start oral iron supplement.  BP uncontrolled, continue losartan and refill minoxidil 2.5mg bid    KFRE 2-Year: 3.8% at 1/9/2025  8:17 AM  Calculated from:  Serum Creatinine: 2.1 mg/dL at 1/9/2025  8:17 AM  Urine Albumin Creatinine Ratio: 232.0 ug/mg at 11/5/2024 11:09 AM  Age: 34 years  Sex: Male at 1/9/2025  8:17 AM  Has CKD-3 to CKD-5: Yes    KFRE 5-Year: 11.3% at 1/9/2025  8:17 AM  Calculated from:  Serum Creatinine: 2.1 mg/dL at 1/9/2025  8:17 AM  Urine Albumin Creatinine Ratio: 232.0 ug/mg at 11/5/2024 11:09 AM  Age: 34 years  Sex: Male at 1/9/2025  8:17 AM  Has CKD-3 to CKD-5: Yes

## 2025-01-24 ENCOUNTER — OFFICE VISIT (OUTPATIENT)
Dept: URGENT CARE | Facility: CLINIC | Age: 35
End: 2025-01-24
Payer: COMMERCIAL

## 2025-01-24 ENCOUNTER — OFFICE VISIT (OUTPATIENT)
Dept: NEPHROLOGY | Facility: CLINIC | Age: 35
End: 2025-01-24
Payer: COMMERCIAL

## 2025-01-24 ENCOUNTER — PATIENT MESSAGE (OUTPATIENT)
Dept: URGENT CARE | Facility: CLINIC | Age: 35
End: 2025-01-24
Payer: COMMERCIAL

## 2025-01-24 VITALS
RESPIRATION RATE: 28 BRPM | SYSTOLIC BLOOD PRESSURE: 192 MMHG | DIASTOLIC BLOOD PRESSURE: 82 MMHG | HEART RATE: 100 BPM | OXYGEN SATURATION: 98 %

## 2025-01-24 VITALS
HEART RATE: 112 BPM | TEMPERATURE: 97 F | RESPIRATION RATE: 18 BRPM | BODY MASS INDEX: 24.41 KG/M2 | WEIGHT: 124.31 LBS | HEIGHT: 60 IN | OXYGEN SATURATION: 98 % | SYSTOLIC BLOOD PRESSURE: 180 MMHG | DIASTOLIC BLOOD PRESSURE: 78 MMHG

## 2025-01-24 DIAGNOSIS — I10 HYPERTENSION, UNSPECIFIED TYPE: ICD-10-CM

## 2025-01-24 DIAGNOSIS — N25.81 SECONDARY HYPERPARATHYROIDISM OF RENAL ORIGIN: ICD-10-CM

## 2025-01-24 DIAGNOSIS — N18.32 CKD STAGE 3B, GFR 30-44 ML/MIN: Primary | ICD-10-CM

## 2025-01-24 DIAGNOSIS — R60.0 BILATERAL LEG EDEMA: Primary | ICD-10-CM

## 2025-01-24 DIAGNOSIS — R03.0 ELEVATED BLOOD PRESSURE READING: ICD-10-CM

## 2025-01-24 PROCEDURE — 99999 PR PBB SHADOW E&M-EST. PATIENT-LVL III: CPT | Mod: PBBFAC,,, | Performed by: INTERNAL MEDICINE

## 2025-01-24 PROCEDURE — 99499 UNLISTED E&M SERVICE: CPT | Mod: S$GLB,,, | Performed by: INTERNAL MEDICINE

## 2025-01-24 PROCEDURE — 99203 OFFICE O/P NEW LOW 30 MIN: CPT | Mod: S$GLB,,, | Performed by: NURSE PRACTITIONER

## 2025-01-24 RX ORDER — TORSEMIDE 10 MG/1
10 TABLET ORAL DAILY
Qty: 90 TABLET | Refills: 1 | Status: SHIPPED | OUTPATIENT
Start: 2025-01-24 | End: 2025-07-23

## 2025-01-24 RX ORDER — TORSEMIDE 10 MG/1
10 TABLET ORAL DAILY
Qty: 90 TABLET | Refills: 1 | Status: SHIPPED | OUTPATIENT
Start: 2025-01-24 | End: 2025-01-24

## 2025-01-24 NOTE — PROGRESS NOTES
Subjective:        Patient ID: Miquel Salcedo is a 34 y.o. White male who presents for return patient evaluation for chronic renal failure.    Just took the minoxidil prior to appointment. Has developed lots of peripheral edema.    Review of Systems   All other systems reviewed and are negative.        The past medical, family and social histories were reviewed for this encounter.     BP (!) 192/82 (BP Location: Right arm, Patient Position: Sitting)   Pulse 100   Resp (!) 28   SpO2 98%     Objective:      Physical Exam  Vitals reviewed.   Constitutional:       General: She is not in acute distress.     Appearance: She is well-developed.   HENT:      Head: Normocephalic and atraumatic.   Eyes:      General: No scleral icterus.     Conjunctiva/sclera: Conjunctivae normal.   Neck:      Vascular: No JVD.   Cardiovascular:      Rate and Rhythm: Normal rate and regular rhythm.      Heart sounds: Normal heart sounds. No murmur heard.     No friction rub. No gallop.   Pulmonary:      Effort: Pulmonary effort is normal. No respiratory distress.      Breath sounds: Normal breath sounds. No wheezing.   Abdominal:      General: Bowel sounds are normal. There is no distension.      Palpations: Abdomen is soft.      Tenderness: There is no abdominal tenderness.   Musculoskeletal:      Right lower leg: Edema present.      Left lower leg: Edema present.   Skin:     General: Skin is warm and dry.      Findings: No rash.   Neurological:      Mental Status: She is alert and oriented to person, place, and time.         Assessment:       1. CKD stage 3b, GFR 30-44 ml/min    2. Hypertension, unspecified type    3. Secondary hyperparathyroidism of renal origin          Lab Results   Component Value Date    CREATININE 2.1 (H) 01/09/2025    BUN 30 (H) 01/09/2025     01/09/2025    K 4.1 01/09/2025     01/09/2025    CO2 20 (L) 01/09/2025     Lab Results   Component Value Date    .3 (H) 01/09/2025    CALCIUM 8.6  (L) 01/09/2025    PHOS 2.7 01/09/2025     Lab Results   Component Value Date    HCT 26.0 (L) 06/24/2024     Prot/Creat Ratio, Urine   Date Value Ref Range Status   01/09/2025 1.05 (H) 0.00 - 0.20 Final   11/05/2024 0.37 (H) 0.00 - 0.20 Final      Plan:   Return to clinic in 3 months.  Labs for next visit include rfp, pth, upc, iron studies.  Baseline creatinine is 1.8-2.1 since 2022. UPC 1.05. CKD clinically due to diabetic nephropathy given patient also has retinopathy.   Bicarb 20, improved on sodium bicarbonate  , Ca 8.6, continue calcitriol  Hct 26, Tsat 9, ferritin 94, start oral iron supplement.  BP uncontrolled, continue losartan, developed swelling on minoxidil, will start on torsemide.

## 2025-01-24 NOTE — PROGRESS NOTES
"Subjective:      Patient ID: Miquel Salcedo is a 34 y.o. male.    Vitals:  height is 4' 11" (1.499 m) and weight is 56.4 kg (124 lb 5.4 oz). Her oral temperature is 97.3 °F (36.3 °C). Her blood pressure is 180/78 (abnormal) and her pulse is 112 (abnormal). Her respiration is 18 and oxygen saturation is 98%.     Chief Complaint: Other Misc (Swollen abdomen, legs and face, short of breath - Entered by patient)    Pt states she has swollen legs, feet, abdomen, and face. Pt has lower back pain and is short of breath. Pt states the swelling has been on and off. Pt has started a high dose of minoxidil and does not know if this could be contributing to her symptoms. Pt symptoms began 4 days ago. 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  Patient recently started back on minoxidil (hair loss) with a higher dose adjustment. Takes losartan for high BP as well, missed dose today.  Patient denies any fever or chills.  No current CP or hemoptysis.  Reports periods of shortness of breath.  Patient is awake and alert.  Answering all questions appropriately.  Speaking in full sentences without pause.    Seen by Dr. Valenzuela/nephrology on 1/14/2024, started back on minoxidil.    Other  This is a new problem. The current episode started in the past 7 days. The problem occurs constantly. The problem has been unchanged. Pertinent negatives include no abdominal pain, arthralgias, chest pain, chills, coughing, fatigue, fever, joint swelling, nausea, neck pain, rash or vomiting. She has tried nothing for the symptoms. The treatment provided no relief.       Constitution: Negative. Negative for chills, " fatigue and fever.   HENT:  Negative for ear pain, ear discharge, facial swelling and sinus pressure.    Neck: Negative for neck pain, neck stiffness and painful lymph nodes.   Cardiovascular:  Positive for leg swelling (bilateral). Negative for chest pain and sob on exertion.   Eyes: Negative.  Negative for eye itching, eye pain and eye redness.   Respiratory:  Positive for shortness of breath. Negative for chest tightness, cough, wheezing and asthma.    Gastrointestinal: Negative.  Negative for abdominal pain, nausea and vomiting.   Endocrine: negative. excessive thirst.   Genitourinary: Negative.  Negative for dysuria, frequency, urgency and flank pain.   Musculoskeletal: Negative.  Negative for pain, trauma, joint pain and joint swelling.   Skin: Negative.  Negative for rash, wound, lesion and hives.   Allergic/Immunologic: Negative.  Negative for eczema, asthma, hives, itching and sneezing.   Neurological: Negative.  Negative for dizziness, passing out, disorientation and altered mental status.   Hematologic/Lymphatic: Negative.  Negative for swollen lymph nodes.   Psychiatric/Behavioral: Negative.  Negative for altered mental status, disorientation and confusion.       Objective:     Physical Exam   Constitutional: She is oriented to person, place, and time. She appears well-developed. She is cooperative.  Non-toxic appearance. She does not appear ill. No distress. awake  HENT:   Head: Normocephalic and atraumatic.   Ears:   Right Ear: Hearing, tympanic membrane, external ear and ear canal normal. no impacted cerumen  Left Ear: Hearing, tympanic membrane, external ear and ear canal normal. no impacted cerumen  Nose: Nose normal. No mucosal edema, rhinorrhea, nasal deformity or congestion. No epistaxis. Right sinus exhibits no maxillary sinus tenderness and no frontal sinus tenderness. Left sinus exhibits no maxillary sinus tenderness and no frontal sinus tenderness.   Mouth/Throat: Uvula is midline, oropharynx  is clear and moist and mucous membranes are normal. No trismus in the jaw. Normal dentition. No uvula swelling. No oropharyngeal exudate, posterior oropharyngeal edema or posterior oropharyngeal erythema.   Eyes: Conjunctivae and lids are normal. No scleral icterus.   Neck: Trachea normal and phonation normal. Neck supple. No edema present. No erythema present. No neck rigidity present.   Cardiovascular: Normal rate, regular rhythm, normal heart sounds and normal pulses.   Pulmonary/Chest: Effort normal and breath sounds normal. No stridor. No respiratory distress. She has no decreased breath sounds. She has no wheezes. She has no rhonchi. She has no rales. She exhibits no tenderness.   Abdominal: Normal appearance. There is no abdominal tenderness.   Musculoskeletal: Normal range of motion.         General: No deformity. Normal range of motion.      Right lower leg: She exhibits no tenderness. 2+ Pitting Edema present.      Left lower leg: She exhibits no tenderness. 2+ Pitting Edema present.   Neurological: no focal deficit. She is alert, oriented to person, place, and time and at baseline. She exhibits normal muscle tone. Coordination normal.   Skin: Skin is warm, dry, intact, not diaphoretic and not pale.   Psychiatric: Her speech is normal and behavior is normal. Mood, judgment and thought content normal.   Nursing note and vitals reviewed.      Assessment:     1. Bilateral leg edema    2. Elevated blood pressure reading        Plan:   Discussed with patient the limitations of urgent care and their current symptoms.  Discussed patient's elevated blood pressure reading.  Patient reports that BP meds were missed this morning.  Patient reports that they will monitor, record and take BP meds when they get home.  Patient instructed to follow-up with PCP/Nephrology today.  Patient given strict ER precautions for any worsening symptoms or failure to improve.  Patient verbalized understanding of all discussed and  agreed with plan of care.    FOLLOWUP  Follow up if symptoms worsen or fail to improve, for PLEASE CONTACT PCP OR CONTACT THE EMERGENCY ROOM..     PATIENT INSTRUCTIONS  Patient Instructions   Follow-up with your PCP/Nephrology today.    INSTRUCTIONS:  - Rest.  - Drink plenty of fluids.  - Take Tylenol and/or Ibuprofen as directed as needed for fever/pain.  Do not take more than the recommended dose.  - follow up with your PCP within the next 1-2 weeks as needed.  - You must understand that you have received an Urgent Care treatment only and that you may be released before all of your medical problems are known or treated.   - You, the patient, will arrange for follow up care as instructed.   - If your condition worsens or fails to improve we recommend that you receive another evaluation at the ER immediately or contact your PCP to discuss your concerns.   - You can call [phone] or [phone] to help schedule an appointment with the appropriate provider.     -If you smoke cigarettes, it would be beneficial for you to stop.         THANK YOU FOR ALLOWING ME TO PARTICIPATE IN YOUR HEALTHCARE,     Johny Gresham NP     Bilateral leg edema    Elevated blood pressure reading

## 2025-01-24 NOTE — PATIENT INSTRUCTIONS
Follow-up with your PCP/Nephrology today.    INSTRUCTIONS:  - Rest.  - Drink plenty of fluids.  - Take Tylenol and/or Ibuprofen as directed as needed for fever/pain.  Do not take more than the recommended dose.  - follow up with your PCP within the next 1-2 weeks as needed.  - You must understand that you have received an Urgent Care treatment only and that you may be released before all of your medical problems are known or treated.   - You, the patient, will arrange for follow up care as instructed.   - If your condition worsens or fails to improve we recommend that you receive another evaluation at the ER immediately or contact your PCP to discuss your concerns.   - You can call [phone] or [phone] to help schedule an appointment with the appropriate provider.     -If you smoke cigarettes, it would be beneficial for you to stop.

## 2025-02-21 ENCOUNTER — LAB VISIT (OUTPATIENT)
Dept: LAB | Facility: HOSPITAL | Age: 35
End: 2025-02-21
Attending: INTERNAL MEDICINE
Payer: COMMERCIAL

## 2025-02-21 DIAGNOSIS — N18.32 STAGE 3B CHRONIC KIDNEY DISEASE: ICD-10-CM

## 2025-02-21 DIAGNOSIS — N18.32 CKD STAGE 3B, GFR 30-44 ML/MIN: ICD-10-CM

## 2025-02-21 LAB
ALBUMIN SERPL BCP-MCNC: 3.4 G/DL (ref 3.5–5.2)
ANION GAP SERPL CALC-SCNC: 14 MMOL/L (ref 8–16)
BASOPHILS # BLD AUTO: 0.06 K/UL (ref 0–0.2)
BASOPHILS NFR BLD: 0.7 % (ref 0–1.9)
BUN SERPL-MCNC: 43 MG/DL (ref 6–20)
CALCIUM SERPL-MCNC: 9.6 MG/DL (ref 8.7–10.5)
CHLORIDE SERPL-SCNC: 101 MMOL/L (ref 95–110)
CO2 SERPL-SCNC: 20 MMOL/L (ref 23–29)
CREAT SERPL-MCNC: 2.4 MG/DL (ref 0.5–1.4)
DIFFERENTIAL METHOD BLD: ABNORMAL
EOSINOPHIL # BLD AUTO: 0.3 K/UL (ref 0–0.5)
EOSINOPHIL NFR BLD: 3.6 % (ref 0–8)
ERYTHROCYTE [DISTWIDTH] IN BLOOD BY AUTOMATED COUNT: 12.6 % (ref 11.5–14.5)
EST. GFR  (NO RACE VARIABLE): 35.6 ML/MIN/1.73 M^2
FERRITIN SERPL-MCNC: 91 NG/ML (ref 20–300)
GLUCOSE SERPL-MCNC: 87 MG/DL (ref 70–110)
HCT VFR BLD AUTO: 26.6 % (ref 40–54)
HGB BLD-MCNC: 8 G/DL (ref 14–18)
IMM GRANULOCYTES # BLD AUTO: 0.03 K/UL (ref 0–0.04)
IMM GRANULOCYTES NFR BLD AUTO: 0.3 % (ref 0–0.5)
IRON SERPL-MCNC: 58 UG/DL (ref 45–160)
LYMPHOCYTES # BLD AUTO: 1.4 K/UL (ref 1–4.8)
LYMPHOCYTES NFR BLD: 16.2 % (ref 18–48)
MCH RBC QN AUTO: 26.4 PG (ref 27–31)
MCHC RBC AUTO-ENTMCNC: 30.1 G/DL (ref 32–36)
MCV RBC AUTO: 88 FL (ref 82–98)
MONOCYTES # BLD AUTO: 0.7 K/UL (ref 0.3–1)
MONOCYTES NFR BLD: 8 % (ref 4–15)
NEUTROPHILS # BLD AUTO: 6.3 K/UL (ref 1.8–7.7)
NEUTROPHILS NFR BLD: 71.2 % (ref 38–73)
NRBC BLD-RTO: 0 /100 WBC
PHOSPHATE SERPL-MCNC: 4.1 MG/DL (ref 2.7–4.5)
PLATELET # BLD AUTO: 362 K/UL (ref 150–450)
PMV BLD AUTO: 10.2 FL (ref 9.2–12.9)
POTASSIUM SERPL-SCNC: 4.7 MMOL/L (ref 3.5–5.1)
RBC # BLD AUTO: 3.03 M/UL (ref 4.6–6.2)
SATURATED IRON: 12 % (ref 20–50)
SODIUM SERPL-SCNC: 135 MMOL/L (ref 136–145)
TOTAL IRON BINDING CAPACITY: 474 UG/DL (ref 250–450)
TRANSFERRIN SERPL-MCNC: 320 MG/DL (ref 200–375)
WBC # BLD AUTO: 8.89 K/UL (ref 3.9–12.7)

## 2025-02-21 PROCEDURE — 80069 RENAL FUNCTION PANEL: CPT | Performed by: INTERNAL MEDICINE

## 2025-02-21 PROCEDURE — 85025 COMPLETE CBC W/AUTO DIFF WBC: CPT | Performed by: INTERNAL MEDICINE

## 2025-02-21 PROCEDURE — 83970 ASSAY OF PARATHORMONE: CPT | Performed by: INTERNAL MEDICINE

## 2025-02-21 PROCEDURE — 83540 ASSAY OF IRON: CPT | Performed by: INTERNAL MEDICINE

## 2025-02-21 PROCEDURE — 36415 COLL VENOUS BLD VENIPUNCTURE: CPT | Mod: PO | Performed by: INTERNAL MEDICINE

## 2025-02-21 PROCEDURE — 82728 ASSAY OF FERRITIN: CPT | Performed by: INTERNAL MEDICINE

## 2025-02-22 LAB — PTH-INTACT SERPL-MCNC: 45.8 PG/ML (ref 9–77)

## 2025-02-27 ENCOUNTER — OFFICE VISIT (OUTPATIENT)
Dept: NEPHROLOGY | Facility: CLINIC | Age: 35
End: 2025-02-27
Payer: COMMERCIAL

## 2025-02-27 VITALS
OXYGEN SATURATION: 97 % | SYSTOLIC BLOOD PRESSURE: 128 MMHG | WEIGHT: 124.31 LBS | BODY MASS INDEX: 24.41 KG/M2 | HEART RATE: 115 BPM | HEIGHT: 60 IN | DIASTOLIC BLOOD PRESSURE: 74 MMHG

## 2025-02-27 DIAGNOSIS — E87.20 METABOLIC ACIDOSIS: ICD-10-CM

## 2025-02-27 DIAGNOSIS — D63.1 ANEMIA IN STAGE 3B CHRONIC KIDNEY DISEASE: ICD-10-CM

## 2025-02-27 DIAGNOSIS — N25.81 SECONDARY HYPERPARATHYROIDISM OF RENAL ORIGIN: ICD-10-CM

## 2025-02-27 DIAGNOSIS — N18.32 CKD STAGE 3B, GFR 30-44 ML/MIN: Primary | ICD-10-CM

## 2025-02-27 DIAGNOSIS — N18.32 ANEMIA IN STAGE 3B CHRONIC KIDNEY DISEASE: ICD-10-CM

## 2025-02-27 DIAGNOSIS — I10 HYPERTENSION, UNSPECIFIED TYPE: ICD-10-CM

## 2025-02-27 PROCEDURE — 99214 OFFICE O/P EST MOD 30 MIN: CPT | Mod: S$GLB,,, | Performed by: INTERNAL MEDICINE

## 2025-02-27 PROCEDURE — 99999 PR PBB SHADOW E&M-EST. PATIENT-LVL III: CPT | Mod: PBBFAC,,, | Performed by: INTERNAL MEDICINE

## 2025-02-27 PROCEDURE — 3074F SYST BP LT 130 MM HG: CPT | Mod: CPTII,S$GLB,, | Performed by: INTERNAL MEDICINE

## 2025-02-27 PROCEDURE — 3066F NEPHROPATHY DOC TX: CPT | Mod: CPTII,S$GLB,, | Performed by: INTERNAL MEDICINE

## 2025-02-27 PROCEDURE — 3008F BODY MASS INDEX DOCD: CPT | Mod: CPTII,S$GLB,, | Performed by: INTERNAL MEDICINE

## 2025-02-27 PROCEDURE — 1159F MED LIST DOCD IN RCRD: CPT | Mod: CPTII,S$GLB,, | Performed by: INTERNAL MEDICINE

## 2025-02-27 PROCEDURE — 1160F RVW MEDS BY RX/DR IN RCRD: CPT | Mod: CPTII,S$GLB,, | Performed by: INTERNAL MEDICINE

## 2025-02-27 PROCEDURE — 3078F DIAST BP <80 MM HG: CPT | Mod: CPTII,S$GLB,, | Performed by: INTERNAL MEDICINE

## 2025-02-27 RX ORDER — SODIUM CHLORIDE 0.9 % (FLUSH) 0.9 %
10 SYRINGE (ML) INJECTION
OUTPATIENT
Start: 2025-02-28

## 2025-02-27 RX ORDER — EPINEPHRINE 0.3 MG/.3ML
0.3 INJECTION SUBCUTANEOUS ONCE AS NEEDED
OUTPATIENT
Start: 2025-02-28

## 2025-02-27 RX ORDER — HEPARIN 100 UNIT/ML
500 SYRINGE INTRAVENOUS
OUTPATIENT
Start: 2025-02-28

## 2025-02-27 RX ORDER — DIPHENHYDRAMINE HYDROCHLORIDE 50 MG/ML
50 INJECTION INTRAMUSCULAR; INTRAVENOUS ONCE AS NEEDED
OUTPATIENT
Start: 2025-02-28

## 2025-02-27 NOTE — PROGRESS NOTES
"Subjective:        Patient ID: Miquel Salcedo is a 34 y.o. White male who presents for return patient evaluation for chronic renal failure.    Doing well, edema improved.    Review of Systems   All other systems reviewed and are negative.      The past medical, family and social histories were reviewed for this encounter.     /74 (BP Location: Left arm, Patient Position: Sitting)   Pulse (!) 115   Ht 4' 11" (1.499 m)   Wt 56.4 kg (124 lb 5.4 oz)   SpO2 97%   BMI 25.11 kg/m²     Objective:      Physical Exam  Vitals reviewed.   Constitutional:       General: She is not in acute distress.     Appearance: She is well-developed.   HENT:      Head: Normocephalic and atraumatic.   Eyes:      General: No scleral icterus.     Conjunctiva/sclera: Conjunctivae normal.   Neck:      Vascular: No JVD.   Cardiovascular:      Rate and Rhythm: Normal rate and regular rhythm.      Heart sounds: Normal heart sounds. No murmur heard.     No friction rub. No gallop.   Pulmonary:      Effort: Pulmonary effort is normal. No respiratory distress.      Breath sounds: Normal breath sounds. No wheezing.   Abdominal:      General: Bowel sounds are normal. There is no distension.      Palpations: Abdomen is soft.      Tenderness: There is no abdominal tenderness.   Musculoskeletal:      Right lower leg: No edema.      Left lower leg: No edema.   Skin:     General: Skin is warm and dry.      Findings: No rash.   Neurological:      Mental Status: She is alert and oriented to person, place, and time.         Assessment:       1. CKD stage 3b, GFR 30-44 ml/min    2. Hypertension, unspecified type    3. Secondary hyperparathyroidism of renal origin    4. Anemia in stage 3b chronic kidney disease    5. Metabolic acidosis            Lab Results   Component Value Date    CREATININE 2.4 (H) 02/21/2025    BUN 43 (H) 02/21/2025     (L) 02/21/2025    K 4.7 02/21/2025     02/21/2025    CO2 20 (L) 02/21/2025     Lab Results "   Component Value Date    PTH 45.8 02/21/2025    CALCIUM 9.6 02/21/2025    PHOS 4.1 02/21/2025     Lab Results   Component Value Date    HCT 26.6 (L) 02/21/2025     Prot/Creat Ratio, Urine   Date Value Ref Range Status   02/21/2025 0.49 (H) 0.00 - 0.20 Final   01/09/2025 1.05 (H) 0.00 - 0.20 Final   11/05/2024 0.37 (H) 0.00 - 0.20 Final      Plan:   Return to clinic in 3 months.  Labs for next visit include rfp, pth, upc, cbc, iron studies.  Baseline creatinine is 1.8-2.1 since 2022. UPC 0.49, CKD clinically due to diabetic nephropathy, patient also has retinopathy.   Bicarb 20, improved on sodium bicarbonate  --> 46, Ca 8.6, continue calcitriol  Hct 26, Tsat 12, ferritin 91, not improving on oral iron supplement, will refer for IV iron.  BP controlled, continue losartan, minoxidil, and torsemide. Patient persistently tachycardic, consider beta blocker if additional agent needed versus referral to cardiology.

## 2025-03-11 ENCOUNTER — INFUSION (OUTPATIENT)
Dept: INFUSION THERAPY | Facility: HOSPITAL | Age: 35
End: 2025-03-11
Attending: INTERNAL MEDICINE
Payer: COMMERCIAL

## 2025-03-11 VITALS
BODY MASS INDEX: 25.93 KG/M2 | OXYGEN SATURATION: 99 % | WEIGHT: 132.06 LBS | DIASTOLIC BLOOD PRESSURE: 62 MMHG | SYSTOLIC BLOOD PRESSURE: 126 MMHG | HEIGHT: 60 IN | RESPIRATION RATE: 16 BRPM | HEART RATE: 96 BPM | TEMPERATURE: 98 F

## 2025-03-11 DIAGNOSIS — N18.32 ANEMIA IN STAGE 3B CHRONIC KIDNEY DISEASE: Primary | ICD-10-CM

## 2025-03-11 DIAGNOSIS — D63.1 ANEMIA IN STAGE 3B CHRONIC KIDNEY DISEASE: Primary | ICD-10-CM

## 2025-03-11 PROCEDURE — 96365 THER/PROPH/DIAG IV INF INIT: CPT | Mod: PN

## 2025-03-11 PROCEDURE — 25000003 PHARM REV CODE 250: Mod: PN | Performed by: INTERNAL MEDICINE

## 2025-03-11 PROCEDURE — 63600175 PHARM REV CODE 636 W HCPCS: Mod: JZ,TB,PN | Performed by: INTERNAL MEDICINE

## 2025-03-11 RX ORDER — HEPARIN 100 UNIT/ML
500 SYRINGE INTRAVENOUS
OUTPATIENT
Start: 2025-03-18

## 2025-03-11 RX ORDER — EPINEPHRINE 0.3 MG/.3ML
0.3 INJECTION SUBCUTANEOUS ONCE AS NEEDED
OUTPATIENT
Start: 2025-03-18

## 2025-03-11 RX ORDER — SODIUM CHLORIDE 0.9 % (FLUSH) 0.9 %
10 SYRINGE (ML) INJECTION
OUTPATIENT
Start: 2025-03-18

## 2025-03-11 RX ORDER — DIPHENHYDRAMINE HYDROCHLORIDE 50 MG/ML
50 INJECTION, SOLUTION INTRAMUSCULAR; INTRAVENOUS ONCE AS NEEDED
OUTPATIENT
Start: 2025-03-18

## 2025-03-11 RX ADMIN — FERRIC CARBOXYMALTOSE INJECTION 750 MG: 50 INJECTION, SOLUTION INTRAVENOUS at 01:03

## 2025-03-11 NOTE — PLAN OF CARE
Problem: Adult Inpatient Plan of Care  Goal: Plan of Care Review  Outcome: Progressing  Flowsheets (Taken 3/11/2025 1455)  Plan of Care Reviewed With: patient  Goal: Patient-Specific Goal (Individualized)  Outcome: Progressing  Flowsheets (Taken 3/11/2025 1455)  Individualized Care Needs: pt likes to read on phone while here  Patient/Family-Specific Goals (Include Timeframe): no s/s of reaction     Problem: Fatigue  Goal: Improved Activity Tolerance  Outcome: Progressing  Intervention: Promote Improved Energy  Flowsheets (Taken 3/11/2025 1455)  Fatigue Management: paced activity encouraged  Sleep/Rest Enhancement: regular sleep/rest pattern promoted  Activity Management: Ambulated in henry -   Environmental Support:   calm environment promoted   distractions minimized     Pt tolerated Injectafer infusion well.  Pt stayed for observation for 30 minutes post infusion.  No s/s of infusion reaction noted.  Instructed to call MD with any problems

## 2025-03-16 RX ORDER — LOSARTAN POTASSIUM 25 MG/1
25 TABLET ORAL DAILY
Qty: 90 TABLET | Refills: 1 | Status: SHIPPED | OUTPATIENT
Start: 2025-03-16 | End: 2025-09-12

## 2025-03-18 ENCOUNTER — INFUSION (OUTPATIENT)
Dept: INFUSION THERAPY | Facility: HOSPITAL | Age: 35
End: 2025-03-18
Attending: INTERNAL MEDICINE
Payer: COMMERCIAL

## 2025-03-18 VITALS
SYSTOLIC BLOOD PRESSURE: 140 MMHG | DIASTOLIC BLOOD PRESSURE: 70 MMHG | TEMPERATURE: 98 F | BODY MASS INDEX: 26.53 KG/M2 | HEIGHT: 60 IN | WEIGHT: 135.13 LBS | OXYGEN SATURATION: 98 % | RESPIRATION RATE: 18 BRPM | HEART RATE: 108 BPM

## 2025-03-18 DIAGNOSIS — N18.32 ANEMIA IN STAGE 3B CHRONIC KIDNEY DISEASE: Primary | ICD-10-CM

## 2025-03-18 DIAGNOSIS — D63.1 ANEMIA IN STAGE 3B CHRONIC KIDNEY DISEASE: Primary | ICD-10-CM

## 2025-03-18 PROCEDURE — 25000003 PHARM REV CODE 250: Mod: PN | Performed by: INTERNAL MEDICINE

## 2025-03-18 PROCEDURE — 63600175 PHARM REV CODE 636 W HCPCS: Mod: JZ,TB,PN | Performed by: INTERNAL MEDICINE

## 2025-03-18 PROCEDURE — 96365 THER/PROPH/DIAG IV INF INIT: CPT | Mod: PN

## 2025-03-18 RX ORDER — DIPHENHYDRAMINE HYDROCHLORIDE 50 MG/ML
50 INJECTION, SOLUTION INTRAMUSCULAR; INTRAVENOUS ONCE AS NEEDED
OUTPATIENT
Start: 2025-03-18

## 2025-03-18 RX ORDER — SODIUM CHLORIDE 0.9 % (FLUSH) 0.9 %
10 SYRINGE (ML) INJECTION
OUTPATIENT
Start: 2025-03-18

## 2025-03-18 RX ORDER — HEPARIN 100 UNIT/ML
500 SYRINGE INTRAVENOUS
Status: DISCONTINUED | OUTPATIENT
Start: 2025-03-18 | End: 2025-03-18 | Stop reason: HOSPADM

## 2025-03-18 RX ORDER — SODIUM CHLORIDE 0.9 % (FLUSH) 0.9 %
10 SYRINGE (ML) INJECTION
Status: DISCONTINUED | OUTPATIENT
Start: 2025-03-18 | End: 2025-03-18 | Stop reason: HOSPADM

## 2025-03-18 RX ORDER — HEPARIN 100 UNIT/ML
500 SYRINGE INTRAVENOUS
OUTPATIENT
Start: 2025-03-18

## 2025-03-18 RX ORDER — EPINEPHRINE 0.3 MG/.3ML
0.3 INJECTION SUBCUTANEOUS ONCE AS NEEDED
OUTPATIENT
Start: 2025-03-18

## 2025-03-18 RX ADMIN — SODIUM CHLORIDE: 9 INJECTION, SOLUTION INTRAVENOUS at 11:03

## 2025-03-18 RX ADMIN — FERRIC CARBOXYMALTOSE INJECTION 750 MG: 50 INJECTION, SOLUTION INTRAVENOUS at 11:03

## 2025-03-18 NOTE — PLAN OF CARE
Problem: Adult Inpatient Plan of Care  Goal: Plan of Care Review  Outcome: Progressing  Goal: Patient-Specific Goal (Individualized)  Outcome: Progressing     Problem: Fatigue  Goal: Improved Activity Tolerance  Outcome: Progressing  Intervention: Promote Improved Energy  Flowsheets (Taken 3/18/2025 1202)  Activity Management: Ambulated -L4  Environmental Support: rest periods encouraged

## 2025-03-18 NOTE — NURSING
Pt arrived to unit for injectafer. Instructed pt will need to wait 30 minutes to be observed. Pt verbalized understanding.  1250 Tolerated well. Left unit NAD.

## 2025-04-09 ENCOUNTER — LAB VISIT (OUTPATIENT)
Dept: LAB | Facility: HOSPITAL | Age: 35
End: 2025-04-09
Attending: INTERNAL MEDICINE
Payer: COMMERCIAL

## 2025-04-09 DIAGNOSIS — N18.32 STAGE 3B CHRONIC KIDNEY DISEASE: ICD-10-CM

## 2025-04-09 DIAGNOSIS — N18.32 CKD STAGE 3B, GFR 30-44 ML/MIN: ICD-10-CM

## 2025-04-09 LAB
ABSOLUTE EOSINOPHIL (OHS): 0.25 K/UL
ABSOLUTE MONOCYTE (OHS): 0.75 K/UL (ref 0.3–1)
ABSOLUTE NEUTROPHIL COUNT (OHS): 6.3 K/UL (ref 1.8–7.7)
ALBUMIN SERPL BCP-MCNC: 3.2 G/DL (ref 3.5–5.2)
ANION GAP (OHS): 12 MMOL/L (ref 8–16)
BASOPHILS # BLD AUTO: 0.08 K/UL
BASOPHILS NFR BLD AUTO: 0.9 %
BUN SERPL-MCNC: 38 MG/DL (ref 6–20)
CALCIUM SERPL-MCNC: 8.7 MG/DL (ref 8.7–10.5)
CHLORIDE SERPL-SCNC: 109 MMOL/L (ref 95–110)
CO2 SERPL-SCNC: 17 MMOL/L (ref 23–29)
CREAT SERPL-MCNC: 2.7 MG/DL (ref 0.5–1.4)
CREAT UR-MCNC: 82 MG/DL (ref 23–375)
ERYTHROCYTE [DISTWIDTH] IN BLOOD BY AUTOMATED COUNT: 14.7 % (ref 11.5–14.5)
FERRITIN SERPL-MCNC: 1117 NG/ML (ref 20–300)
GFR SERPLBLD CREATININE-BSD FMLA CKD-EPI: 31 ML/MIN/1.73/M2
GLUCOSE SERPL-MCNC: 100 MG/DL (ref 70–110)
HCT VFR BLD AUTO: 25 % (ref 40–54)
HGB BLD-MCNC: 7.7 GM/DL (ref 14–18)
IMM GRANULOCYTES # BLD AUTO: 0.05 K/UL (ref 0–0.04)
IMM GRANULOCYTES NFR BLD AUTO: 0.6 % (ref 0–0.5)
IRON SATN MFR SERPL: 21 % (ref 20–50)
IRON SERPL-MCNC: 67 UG/DL (ref 45–160)
LYMPHOCYTES # BLD AUTO: 1.33 K/UL (ref 1–4.8)
MCH RBC QN AUTO: 27.9 PG (ref 27–31)
MCHC RBC AUTO-ENTMCNC: 30.8 G/DL (ref 32–36)
MCV RBC AUTO: 91 FL (ref 82–98)
NUCLEATED RBC (/100WBC) (OHS): 0 /100 WBC
PHOSPHATE SERPL-MCNC: 3.2 MG/DL (ref 2.7–4.5)
PLATELET # BLD AUTO: 327 K/UL (ref 150–450)
PMV BLD AUTO: 10.3 FL (ref 9.2–12.9)
POTASSIUM SERPL-SCNC: 4.8 MMOL/L (ref 3.5–5.1)
PROT UR-MCNC: 26 MG/DL
PROT/CREAT UR: 0.32 MG/G{CREAT}
RBC # BLD AUTO: 2.76 M/UL (ref 4.6–6.2)
RELATIVE EOSINOPHIL (OHS): 2.9 %
RELATIVE LYMPHOCYTE (OHS): 15.2 % (ref 18–48)
RELATIVE MONOCYTE (OHS): 8.6 % (ref 4–15)
RELATIVE NEUTROPHIL (OHS): 71.8 % (ref 38–73)
SODIUM SERPL-SCNC: 138 MMOL/L (ref 136–145)
TIBC SERPL-MCNC: 318 UG/DL (ref 250–450)
TRANSFERRIN SERPL-MCNC: 215 MG/DL (ref 200–375)
WBC # BLD AUTO: 8.76 K/UL (ref 3.9–12.7)

## 2025-04-09 PROCEDURE — 83970 ASSAY OF PARATHORMONE: CPT

## 2025-04-09 PROCEDURE — 85025 COMPLETE CBC W/AUTO DIFF WBC: CPT

## 2025-04-09 PROCEDURE — 83540 ASSAY OF IRON: CPT

## 2025-04-09 PROCEDURE — 82570 ASSAY OF URINE CREATININE: CPT

## 2025-04-09 PROCEDURE — 82728 ASSAY OF FERRITIN: CPT

## 2025-04-09 PROCEDURE — 36415 COLL VENOUS BLD VENIPUNCTURE: CPT | Mod: PO

## 2025-04-09 PROCEDURE — 82040 ASSAY OF SERUM ALBUMIN: CPT

## 2025-04-10 ENCOUNTER — RESULTS FOLLOW-UP (OUTPATIENT)
Dept: NEPHROLOGY | Facility: CLINIC | Age: 35
End: 2025-04-10

## 2025-04-10 LAB — PTH-INTACT SERPL-MCNC: 199.2 PG/ML (ref 9–77)

## 2025-04-15 ENCOUNTER — OFFICE VISIT (OUTPATIENT)
Dept: NEPHROLOGY | Facility: CLINIC | Age: 35
End: 2025-04-15
Payer: COMMERCIAL

## 2025-04-15 VITALS
HEIGHT: 60 IN | OXYGEN SATURATION: 99 % | DIASTOLIC BLOOD PRESSURE: 88 MMHG | HEART RATE: 109 BPM | SYSTOLIC BLOOD PRESSURE: 146 MMHG | BODY MASS INDEX: 26.53 KG/M2 | WEIGHT: 135.13 LBS

## 2025-04-15 DIAGNOSIS — N18.32 ANEMIA IN STAGE 3B CHRONIC KIDNEY DISEASE: ICD-10-CM

## 2025-04-15 DIAGNOSIS — E87.20 METABOLIC ACIDOSIS: ICD-10-CM

## 2025-04-15 DIAGNOSIS — I10 HYPERTENSION, UNSPECIFIED TYPE: ICD-10-CM

## 2025-04-15 DIAGNOSIS — N18.32 CKD STAGE 3B, GFR 30-44 ML/MIN: Primary | ICD-10-CM

## 2025-04-15 DIAGNOSIS — N25.81 SECONDARY HYPERPARATHYROIDISM OF RENAL ORIGIN: ICD-10-CM

## 2025-04-15 DIAGNOSIS — D63.1 ANEMIA IN STAGE 3B CHRONIC KIDNEY DISEASE: ICD-10-CM

## 2025-04-15 PROCEDURE — 3077F SYST BP >= 140 MM HG: CPT | Mod: CPTII,S$GLB,, | Performed by: INTERNAL MEDICINE

## 2025-04-15 PROCEDURE — 3079F DIAST BP 80-89 MM HG: CPT | Mod: CPTII,S$GLB,, | Performed by: INTERNAL MEDICINE

## 2025-04-15 PROCEDURE — 3008F BODY MASS INDEX DOCD: CPT | Mod: CPTII,S$GLB,, | Performed by: INTERNAL MEDICINE

## 2025-04-15 PROCEDURE — 99999 PR PBB SHADOW E&M-EST. PATIENT-LVL III: CPT | Mod: PBBFAC,,, | Performed by: INTERNAL MEDICINE

## 2025-04-15 PROCEDURE — 1160F RVW MEDS BY RX/DR IN RCRD: CPT | Mod: CPTII,S$GLB,, | Performed by: INTERNAL MEDICINE

## 2025-04-15 PROCEDURE — 99214 OFFICE O/P EST MOD 30 MIN: CPT | Mod: S$GLB,,, | Performed by: INTERNAL MEDICINE

## 2025-04-15 PROCEDURE — 4010F ACE/ARB THERAPY RXD/TAKEN: CPT | Mod: CPTII,S$GLB,, | Performed by: INTERNAL MEDICINE

## 2025-04-15 PROCEDURE — 1159F MED LIST DOCD IN RCRD: CPT | Mod: CPTII,S$GLB,, | Performed by: INTERNAL MEDICINE

## 2025-04-15 PROCEDURE — 3066F NEPHROPATHY DOC TX: CPT | Mod: CPTII,S$GLB,, | Performed by: INTERNAL MEDICINE

## 2025-04-15 RX ORDER — MINOXIDIL 2.5 MG/1
2.5 TABLET ORAL 2 TIMES DAILY
Qty: 60 TABLET | Refills: 2 | Status: SHIPPED | OUTPATIENT
Start: 2025-04-15 | End: 2025-07-14

## 2025-04-15 RX ORDER — SODIUM BICARBONATE 650 MG/1
1300 TABLET ORAL 2 TIMES DAILY
Qty: 120 TABLET | Refills: 11 | Status: SHIPPED | OUTPATIENT
Start: 2025-04-15 | End: 2026-04-15

## 2025-04-15 NOTE — PROGRESS NOTES
"Subjective:        Patient ID: Miquel Salcedo is a 34 y.o. White female who presents for return patient evaluation for chronic renal failure.    Doing well, edema improved. No major issues since last seen.    Review of Systems   All other systems reviewed and are negative.      The past medical, family and social histories were reviewed for this encounter.     BP (!) 146/88 (BP Location: Left arm, Patient Position: Sitting)   Pulse 109   Ht 4' 11" (1.499 m)   Wt 61.3 kg (135 lb 2.3 oz)   SpO2 99%   BMI 27.30 kg/m²     Objective:      Physical Exam  Vitals reviewed.   Constitutional:       Appearance: Normal appearance.   HENT:      Head: Normocephalic and atraumatic.   Musculoskeletal:      Right lower leg: No edema.      Left lower leg: No edema.   Skin:     General: Skin is warm and dry.   Neurological:      General: No focal deficit present.      Mental Status: She is alert and oriented to person, place, and time.   Psychiatric:         Mood and Affect: Mood normal.         Behavior: Behavior normal.       Assessment:       1. CKD stage 3b, GFR 30-44 ml/min    2. Hypertension, unspecified type    3. Secondary hyperparathyroidism of renal origin    4. Metabolic acidosis    5. Anemia in stage 3b chronic kidney disease              Lab Results   Component Value Date    CREATININE 2.7 (H) 04/09/2025    BUN 38 (H) 04/09/2025     04/09/2025    K 4.8 04/09/2025     04/09/2025    CO2 17 (L) 04/09/2025     Lab Results   Component Value Date    .2 (H) 04/09/2025    CALCIUM 8.7 04/09/2025    PHOS 3.2 04/09/2025     Lab Results   Component Value Date    HCT 25.0 (L) 04/09/2025     Urine Protein/Creatinine Ratio   Date Value Ref Range Status   04/09/2025 0.32 (H) <=0.20 Final     Prot/Creat Ratio, Urine   Date Value Ref Range Status   02/21/2025 0.49 (H) 0.00 - 0.20 Final   01/09/2025 1.05 (H) 0.00 - 0.20 Final   11/05/2024 0.37 (H) 0.00 - 0.20 Final      Plan:   Return to clinic in 6 " weeks.  Labs for next visit include rfp, pth, upc, cbc, iron studies.  Baseline creatinine is 1.8-2.1 since 2022. UPC 0.49, CKD clinically due to diabetic nephropathy, patient also has retinopathy. Renal function progressively declining without an obvious cause, will refer for kidney biopsy.  Bicarb 17, increase sodium bicarbonate to 1300mg bid  --> 46--> 199, Ca 8.7, continue calcitriol  Hct 25, s/p IV iron, will refer for epo  BP controlled, continue losartan, minoxidil, and torsemide. Patient persistently tachycardic, consider beta blocker if additional agent needed versus referral to cardiology.

## 2025-05-05 ENCOUNTER — PATIENT MESSAGE (OUTPATIENT)
Dept: NEPHROLOGY | Facility: CLINIC | Age: 35
End: 2025-05-05
Payer: COMMERCIAL

## 2025-05-06 ENCOUNTER — TELEPHONE (OUTPATIENT)
Dept: CARDIOTHORACIC SURGERY | Facility: CLINIC | Age: 35
End: 2025-05-06
Payer: COMMERCIAL

## 2025-05-06 ENCOUNTER — HOSPITAL ENCOUNTER (OUTPATIENT)
Facility: HOSPITAL | Age: 35
Discharge: HOME | End: 2025-05-09
Attending: EMERGENCY MEDICINE | Admitting: EMERGENCY MEDICINE
Payer: COMMERCIAL

## 2025-05-06 DIAGNOSIS — R07.9 CHEST PAIN: ICD-10-CM

## 2025-05-06 DIAGNOSIS — Q23.81 BICUSPID AORTIC VALVE: ICD-10-CM

## 2025-05-06 DIAGNOSIS — R06.02 SHORTNESS OF BREATH: ICD-10-CM

## 2025-05-06 PROBLEM — R06.09 DOE (DYSPNEA ON EXERTION): Status: ACTIVE | Noted: 2025-05-06

## 2025-05-06 PROBLEM — R79.89 ELEVATED TROPONIN: Status: ACTIVE | Noted: 2025-05-06

## 2025-05-06 PROBLEM — I35.0 SEVERE AORTIC STENOSIS: Status: ACTIVE | Noted: 2025-05-06

## 2025-05-06 PROBLEM — I50.22 CHRONIC SYSTOLIC HEART FAILURE: Status: ACTIVE | Noted: 2025-05-06

## 2025-05-06 LAB
ABSOLUTE EOSINOPHIL (OHS): 0.57 K/UL
ABSOLUTE MONOCYTE (OHS): 1.18 K/UL (ref 0.3–1)
ABSOLUTE NEUTROPHIL COUNT (OHS): 7.76 K/UL (ref 1.8–7.7)
ALBUMIN SERPL BCP-MCNC: 3.2 G/DL (ref 3.5–5.2)
ALP SERPL-CCNC: 70 UNIT/L (ref 40–150)
ALT SERPL W/O P-5'-P-CCNC: 40 UNIT/L (ref 10–44)
ANION GAP (OHS): 11 MMOL/L (ref 8–16)
AST SERPL-CCNC: 31 UNIT/L (ref 11–45)
BACTERIA #/AREA URNS AUTO: NORMAL /HPF
BASOPHILS # BLD AUTO: 0.11 K/UL
BASOPHILS NFR BLD AUTO: 1 %
BILIRUB SERPL-MCNC: 0.2 MG/DL (ref 0.1–1)
BILIRUB UR QL STRIP.AUTO: NEGATIVE
BNP SERPL-MCNC: 130 PG/ML (ref 0–99)
BUN SERPL-MCNC: 38 MG/DL (ref 6–20)
CALCIUM SERPL-MCNC: 8.7 MG/DL (ref 8.7–10.5)
CHLORIDE SERPL-SCNC: 97 MMOL/L (ref 95–110)
CLARITY UR: CLEAR
CO2 SERPL-SCNC: 23 MMOL/L (ref 23–29)
COLOR UR AUTO: COLORLESS
CREAT SERPL-MCNC: 2.5 MG/DL (ref 0.5–1.4)
ERYTHROCYTE [DISTWIDTH] IN BLOOD BY AUTOMATED COUNT: 14 % (ref 11.5–14.5)
GFR SERPLBLD CREATININE-BSD FMLA CKD-EPI: 34 ML/MIN/1.73/M2
GLUCOSE SERPL-MCNC: 112 MG/DL (ref 70–110)
GLUCOSE UR QL STRIP: NEGATIVE
HCT VFR BLD AUTO: 29.4 % (ref 40–54)
HCV AB SERPL QL IA: NORMAL
HGB BLD-MCNC: 9.8 GM/DL (ref 14–18)
HGB UR QL STRIP: ABNORMAL
HIV 1+2 AB+HIV1 P24 AG SERPL QL IA: NORMAL
HOLD SPECIMEN: NORMAL
HYALINE CASTS UR QL AUTO: 0 /LPF (ref 0–1)
IMM GRANULOCYTES # BLD AUTO: 0.13 K/UL (ref 0–0.04)
IMM GRANULOCYTES NFR BLD AUTO: 1.2 % (ref 0–0.5)
KETONES UR QL STRIP: ABNORMAL
LEUKOCYTE ESTERASE UR QL STRIP: NEGATIVE
LYMPHOCYTES # BLD AUTO: 1.49 K/UL (ref 1–4.8)
MCH RBC QN AUTO: 28.1 PG (ref 27–31)
MCHC RBC AUTO-ENTMCNC: 33.3 G/DL (ref 32–36)
MCV RBC AUTO: 84 FL (ref 82–98)
MICROSCOPIC COMMENT: NORMAL
NITRITE UR QL STRIP: NEGATIVE
NUCLEATED RBC (/100WBC) (OHS): 0 /100 WBC
OHS QRS DURATION: 82 MS
OHS QTC CALCULATION: 478 MS
PH UR STRIP: 6 [PH]
PLATELET # BLD AUTO: 366 K/UL (ref 150–450)
PMV BLD AUTO: 9.4 FL (ref 9.2–12.9)
POCT GLUCOSE: 113 MG/DL (ref 70–110)
POCT GLUCOSE: 116 MG/DL (ref 70–110)
POTASSIUM SERPL-SCNC: 4.2 MMOL/L (ref 3.5–5.1)
PROCALCITONIN SERPL-MCNC: 0.17 NG/ML
PROT SERPL-MCNC: 7.2 GM/DL (ref 6–8.4)
PROT UR QL STRIP: ABNORMAL
RBC # BLD AUTO: 3.49 M/UL (ref 4.6–6.2)
RBC #/AREA URNS AUTO: <1 /HPF (ref 0–4)
RELATIVE EOSINOPHIL (OHS): 5.1 %
RELATIVE LYMPHOCYTE (OHS): 13.3 % (ref 18–48)
RELATIVE MONOCYTE (OHS): 10.5 % (ref 4–15)
RELATIVE NEUTROPHIL (OHS): 68.9 % (ref 38–73)
SODIUM SERPL-SCNC: 131 MMOL/L (ref 136–145)
SP GR UR STRIP: 1.01
SQUAMOUS #/AREA URNS AUTO: <1 /HPF
TROPONIN I SERPL HS-MCNC: 369 NG/L
TROPONIN I SERPL HS-MCNC: 454 NG/L
UROBILINOGEN UR STRIP-ACNC: NEGATIVE EU/DL
WBC # BLD AUTO: 11.24 K/UL (ref 3.9–12.7)
WBC #/AREA URNS AUTO: <1 /HPF (ref 0–5)
YEAST UR QL AUTO: NORMAL /HPF

## 2025-05-06 PROCEDURE — 82962 GLUCOSE BLOOD TEST: CPT

## 2025-05-06 PROCEDURE — 93010 ELECTROCARDIOGRAM REPORT: CPT | Mod: ,,, | Performed by: INTERNAL MEDICINE

## 2025-05-06 PROCEDURE — G0378 HOSPITAL OBSERVATION PER HR: HCPCS

## 2025-05-06 PROCEDURE — 83880 ASSAY OF NATRIURETIC PEPTIDE: CPT

## 2025-05-06 PROCEDURE — 93005 ELECTROCARDIOGRAM TRACING: CPT

## 2025-05-06 PROCEDURE — 84484 ASSAY OF TROPONIN QUANT: CPT | Mod: 91

## 2025-05-06 PROCEDURE — 85025 COMPLETE CBC W/AUTO DIFF WBC: CPT

## 2025-05-06 PROCEDURE — 84145 PROCALCITONIN (PCT): CPT

## 2025-05-06 PROCEDURE — 80053 COMPREHEN METABOLIC PANEL: CPT

## 2025-05-06 PROCEDURE — 87389 HIV-1 AG W/HIV-1&-2 AB AG IA: CPT | Performed by: PHYSICIAN ASSISTANT

## 2025-05-06 PROCEDURE — 86803 HEPATITIS C AB TEST: CPT | Performed by: PHYSICIAN ASSISTANT

## 2025-05-06 PROCEDURE — 99285 EMERGENCY DEPT VISIT HI MDM: CPT | Mod: 25

## 2025-05-06 PROCEDURE — 81003 URINALYSIS AUTO W/O SCOPE: CPT

## 2025-05-06 PROCEDURE — 84484 ASSAY OF TROPONIN QUANT: CPT

## 2025-05-06 PROCEDURE — 25000003 PHARM REV CODE 250

## 2025-05-06 RX ORDER — LEVOTHYROXINE SODIUM 50 UG/1
50 TABLET ORAL
Status: DISCONTINUED | OUTPATIENT
Start: 2025-05-07 | End: 2025-05-09 | Stop reason: HOSPADM

## 2025-05-06 RX ORDER — CARVEDILOL 12.5 MG/1
12.5 TABLET ORAL 2 TIMES DAILY
COMMUNITY
Start: 2025-05-06

## 2025-05-06 RX ORDER — CARVEDILOL 12.5 MG/1
12.5 TABLET ORAL 2 TIMES DAILY
Status: DISCONTINUED | OUTPATIENT
Start: 2025-05-06 | End: 2025-05-09 | Stop reason: HOSPADM

## 2025-05-06 RX ORDER — SODIUM BICARBONATE 650 MG/1
1300 TABLET ORAL 2 TIMES DAILY
Status: DISCONTINUED | OUTPATIENT
Start: 2025-05-06 | End: 2025-05-09 | Stop reason: HOSPADM

## 2025-05-06 RX ORDER — GLUCAGON 1 MG
1 KIT INJECTION
Status: DISCONTINUED | OUTPATIENT
Start: 2025-05-06 | End: 2025-05-09 | Stop reason: HOSPADM

## 2025-05-06 RX ORDER — IBUPROFEN 200 MG
24 TABLET ORAL
Status: DISCONTINUED | OUTPATIENT
Start: 2025-05-06 | End: 2025-05-09 | Stop reason: HOSPADM

## 2025-05-06 RX ORDER — MINOXIDIL 2.5 MG/1
2.5 TABLET ORAL 2 TIMES DAILY
Status: DISCONTINUED | OUTPATIENT
Start: 2025-05-06 | End: 2025-05-09 | Stop reason: HOSPADM

## 2025-05-06 RX ORDER — LOSARTAN POTASSIUM 25 MG/1
25 TABLET ORAL DAILY
Status: DISCONTINUED | OUTPATIENT
Start: 2025-05-07 | End: 2025-05-09 | Stop reason: HOSPADM

## 2025-05-06 RX ORDER — CALCITRIOL 0.25 UG/1
0.25 CAPSULE ORAL DAILY
Status: DISCONTINUED | OUTPATIENT
Start: 2025-05-07 | End: 2025-05-09 | Stop reason: HOSPADM

## 2025-05-06 RX ORDER — ASPIRIN 81 MG/1
81 TABLET ORAL DAILY
COMMUNITY
Start: 2025-05-06

## 2025-05-06 RX ORDER — TORSEMIDE 10 MG/1
10 TABLET ORAL DAILY
Status: DISCONTINUED | OUTPATIENT
Start: 2025-05-07 | End: 2025-05-06

## 2025-05-06 RX ORDER — NALOXONE HCL 0.4 MG/ML
0.02 VIAL (ML) INJECTION
Status: DISCONTINUED | OUTPATIENT
Start: 2025-05-06 | End: 2025-05-09 | Stop reason: HOSPADM

## 2025-05-06 RX ORDER — SODIUM CHLORIDE 0.9 % (FLUSH) 0.9 %
10 SYRINGE (ML) INJECTION EVERY 12 HOURS PRN
Status: DISCONTINUED | OUTPATIENT
Start: 2025-05-06 | End: 2025-05-09 | Stop reason: HOSPADM

## 2025-05-06 RX ORDER — IBUPROFEN 200 MG
16 TABLET ORAL
Status: DISCONTINUED | OUTPATIENT
Start: 2025-05-06 | End: 2025-05-09 | Stop reason: HOSPADM

## 2025-05-06 RX ORDER — FINASTERIDE 5 MG/1
5 TABLET, FILM COATED ORAL DAILY
Status: DISCONTINUED | OUTPATIENT
Start: 2025-05-07 | End: 2025-05-09 | Stop reason: HOSPADM

## 2025-05-06 RX ORDER — INSULIN ASPART 100 [IU]/ML
0-5 INJECTION, SOLUTION INTRAVENOUS; SUBCUTANEOUS
Status: DISCONTINUED | OUTPATIENT
Start: 2025-05-06 | End: 2025-05-07

## 2025-05-06 RX ADMIN — CARVEDILOL 12.5 MG: 12.5 TABLET, FILM COATED ORAL at 10:05

## 2025-05-06 RX ADMIN — SODIUM BICARBONATE 650 MG TABLET 1300 MG: at 10:05

## 2025-05-06 RX ADMIN — ESTRADIOL 3 MG: 2 TABLET ORAL at 10:05

## 2025-05-06 RX ADMIN — MINOXIDIL 2.5 MG: 2.5 TABLET ORAL at 10:05

## 2025-05-06 NOTE — PROVIDER PROGRESS NOTES - EMERGENCY DEPT.
Encounter Date: 5/6/2025    ED Physician Progress Notes        Physician Note:   35 y/o F w/Pmhx of CKD3b, male to female transition on estrogen, T2DM, astrocytoma w/ shunt, hypothyroidism, hyperparathyroidism, HLD, and bicuspid aortic valve. Patient recently discharged from [facility] after being admitted for pneumonia (viral vs bacterial), hypertensive urgency with Type 2 NSTEMI presents to the ED complaining of SOB on exertion > rest, blurry vision, dry cough, and low energy. Pt signed out to me @5:00 PM. CBC, CMP, procal, BNP pending. CXR done, interpretation pending.       Procal negative. CMP unremarkable. Troponin elevated at 454. 2nd Trop pending. Cardiology consulted. CXR unremarkable. Admitting for Observation.

## 2025-05-06 NOTE — ED PROVIDER NOTES
EKG read interpreted by me:  Sinus tachycardia, 106 beats per minute, normal axis, normal intervals, possible left atrial enlargement, LVH with repolarization abnormality, no STEMI     Felipe Pool MD  05/06/25 2269

## 2025-05-06 NOTE — TELEPHONE ENCOUNTER
Returned call and scheduled pt for consultation on next available clinic date.       ----- Message from Regina sent at 5/6/2025 10:40 AM CDT -----  Regarding: Scheduling Request  Name Of Caller:     Gin Block  (Pt's God-Mother)Contact Preference:   [phone] of call:   Pt was referred to Dr. Velazquez for aortic valve replacement by Dr. Justo Rodrigues, Cardiologist at [facility]. She states pt needs an urgent appt and has some concerns. Requesting a call back.

## 2025-05-06 NOTE — ED PROVIDER NOTES
Encounter Date: 5/6/2025       History     Chief Complaint   Patient presents with    Chest Pain    Shortness of Breath     Fuad Salcedo, 33 y/o F w/Pmhx of CKD3b, male to female transition on estrogen, T2DM, astrocytoma w/ shunt, hypothyroidism, hyperparathyroidism, HLD, and bicuspid aortic valve. Patient recently discharged from [facility] after being admitted for pneumonia (viral vs bacterial), hypertensive urgency with Type 2 NSTEMI. CHRIS performed there showed bicuspid aortic valve with severe aortic regurgitation with moderately dilated ascending aortic root measured at 4.2 cm. Patient was due to see Dr. Velazquez next week for second opinion/evaluation for aortic valve replacement/repair. Since discharge, patient has been reporting SOB on exertion > rest, blurry vision, dry cough, and low energy. She was told to be on bed rest since discharge. Has not taken her home torsemide because she felt dehydrated at home and didn't want to further dehydrate herself. Discharged on aspirin and carvedilol but has yet to pick it up from pharmacy.       Review of patient's allergies indicates:  No Known Allergies  Past Medical History:   Diagnosis Date    Astrocytoma brain tumor     Diabetes mellitus     Hypothyroid     Macular degeneration     OU     Past Surgical History:   Procedure Laterality Date    CSF SHUNT      SOFT TISSUE TUMOR RESECTION      patient was about 5 yr of age     Family History   Problem Relation Name Age of Onset    Stroke Father      Heart disease Father      Amblyopia Neg Hx      Blindness Neg Hx      Cataracts Neg Hx      Macular degeneration Neg Hx      Retinal detachment Neg Hx      Strabismus Neg Hx      Glaucoma Neg Hx       Social History[1]  Review of Systems    Physical Exam     Initial Vitals [05/06/25 1521]   BP Pulse Resp Temp SpO2   (!) 161/69 (!) 112 20 97.9 °F (36.6 °C) 99 %      MAP       --         Physical Exam    Nursing note and vitals reviewed.  Constitutional: She  appears distressed.   HENT:   Head: Normocephalic and atraumatic.   Cardiovascular:  Normal rate and regular rhythm.           No bounding pulses    Pulmonary/Chest: Breath sounds normal.   Increased respiratory effort    Abdominal: Abdomen is soft. There is no abdominal tenderness. There is no rebound and no guarding.     Neurological: She is oriented to person, place, and time.   Psychiatric: She has a normal mood and affect.         ED Course   Procedures  Labs Reviewed   HEPATITIS C ANTIBODY   HEP C VIRUS HOLD SPECIMEN   HIV 1 / 2 ANTIBODY   CBC W/ AUTO DIFFERENTIAL    Narrative:     The following orders were created for panel order CBC Auto Differential.  Procedure                               Abnormality         Status                     ---------                               -----------         ------                     CBC with Differential[[id number]]                           In process                   Please view results for these tests on the individual orders.   COMPREHENSIVE METABOLIC PANEL   B-TYPE NATRIURETIC PEPTIDE   TROPONIN I HIGH SENSITIVITY   CBC WITH DIFFERENTIAL   PROCALCITONIN        ECG Results              EKG 12-lead (Final result)        Collection Time Result Time QRS Duration OHS QTC Calculation    05/06/25 15:25:47 05/06/25 15:43:58 82 478                     Final result by Interface, Lab In [facility] (05/06/25 15:44:04)                   Narrative:    Test Reason : R07.9,    Vent. Rate : 106 BPM     Atrial Rate : 106 BPM     P-R Int : 144 ms          QRS Dur :  82 ms      QT Int : 360 ms       P-R-T Axes :  62  51 151 degrees    QTcB Int : 478 ms    Sinus tachycardia  Possible Left atrial enlargement  LVH with repolarization abnormality ( Sokolow-Hollingsworth , Greg product )  Abnormal ECG  No previous ECGs available  Confirmed by Yovani Vidal ([id number]) on 5/6/2025 3:43:54 PM    Referred By:            Confirmed By: Yovani Vidal                                  Imaging Results               X-Ray Chest 1 View (In process)                      Medications - No data to display  Medical Decision Making  Amount and/or Complexity of Data Reviewed  Labs: ordered.  Radiology: ordered.               ED Course as of 05/06/25 1658   Tue May 06, 2025   1527 No STEMI on EKG [NN]      ED Course User Index  [NN] Jennifer Ponce MD                           Clinical Impression:  Final diagnoses:  [R07.9] Chest pain  [R06.02] Shortness of breath                     [1]   Social History  Tobacco Use    Smoking status: Former    Smokeless tobacco: Former   Substance Use Topics    Alcohol use: Yes     Comment: rarely    Drug use: No        Samad, Mawadah, MD  Resident  05/06/25 2408

## 2025-05-06 NOTE — ED TRIAGE NOTES
Miquel Salcedo, a 34 y.o. male presents to the ED w/ complaint of chest pain and sob that started yesterday.     Triage note:  Chief Complaint   Patient presents with    Chest Pain    Shortness of Breath     Review of patient's allergies indicates:  No Known Allergies  Past Medical History:   Diagnosis Date    Astrocytoma brain tumor     Diabetes mellitus     Hypothyroid     Macular degeneration     OU

## 2025-05-07 PROBLEM — I35.1 SEVERE AORTIC REGURGITATION: Status: ACTIVE | Noted: 2025-05-07

## 2025-05-07 LAB
ABSOLUTE EOSINOPHIL (OHS): 0.53 K/UL
ABSOLUTE MONOCYTE (OHS): 1.14 K/UL (ref 0.3–1)
ABSOLUTE NEUTROPHIL COUNT (OHS): 7.09 K/UL (ref 1.8–7.7)
ALBUMIN SERPL BCP-MCNC: 2.9 G/DL (ref 3.5–5.2)
ALP SERPL-CCNC: 65 UNIT/L (ref 40–150)
ALT SERPL W/O P-5'-P-CCNC: 34 UNIT/L (ref 10–44)
ANION GAP (OHS): 11 MMOL/L (ref 8–16)
ASCENDING AORTA: 4 CM
AST SERPL-CCNC: 23 UNIT/L (ref 11–45)
AV AREA BY CONTINUOUS VTI: 1.4 CM2
AV INDEX (PROSTH): 0.43
AV LVOT MEAN GRADIENT: 2 MMHG
AV LVOT PEAK GRADIENT: 4 MMHG
AV MEAN GRADIENT: 14 MMHG
AV PEAK GRADIENT: 29 MMHG
AV REGURGITATION PRESSURE HALF TIME: 439 MS
AV VALVE AREA BY VELOCITY RATIO: 1 CM²
AV VALVE AREA: 1.2 CM²
AV VELOCITY RATIO: 0.37
BASOPHILS # BLD AUTO: 0.07 K/UL
BASOPHILS NFR BLD AUTO: 0.7 %
BILIRUB SERPL-MCNC: 0.2 MG/DL (ref 0.1–1)
BSA FOR ECHO PROCEDURE: 1.51 M2
BUN SERPL-MCNC: 39 MG/DL (ref 6–20)
CALCIUM SERPL-MCNC: 8.8 MG/DL (ref 8.7–10.5)
CHLORIDE SERPL-SCNC: 97 MMOL/L (ref 95–110)
CO2 SERPL-SCNC: 25 MMOL/L (ref 23–29)
CREAT SERPL-MCNC: 2.5 MG/DL (ref 0.5–1.4)
CV ECHO LV RWT: 0.38 CM
DOP CALC AO PEAK VEL: 2.7 M/S
DOP CALC AO VTI: 47.6 CM
DOP CALC LVOT AREA: 2.8 CM2
DOP CALC LVOT DIAMETER: 1.9 CM
DOP CALC LVOT PEAK VEL: 1 M/S
DOP CALC LVOT STROKE VOLUME: 58.1 CM3
DOP CALCLVOT PEAK VEL VTI: 20.5 CM
E WAVE DECELERATION TIME: 188 MS
E/A RATIO: 1.4
E/E' RATIO: 15 M/S
ECHO EF ESTIMATED: 67 %
ECHO LV POSTERIOR WALL: 0.8 CM (ref 0.6–1.1)
EJECTION FRACTION: 60 %
ERYTHROCYTE [DISTWIDTH] IN BLOOD BY AUTOMATED COUNT: 14 % (ref 11.5–14.5)
FRACTIONAL SHORTENING: 35.7 % (ref 28–44)
GFR SERPLBLD CREATININE-BSD FMLA CKD-EPI: 34 ML/MIN/1.73/M2
GLUCOSE SERPL-MCNC: 124 MG/DL (ref 70–110)
HCT VFR BLD AUTO: 27.4 % (ref 40–54)
HGB BLD-MCNC: 9 GM/DL (ref 14–18)
IMM GRANULOCYTES # BLD AUTO: 0.15 K/UL (ref 0–0.04)
IMM GRANULOCYTES NFR BLD AUTO: 1.4 % (ref 0–0.5)
INTERVENTRICULAR SEPTUM: 0.6 CM (ref 0.6–1.1)
IVC DIAMETER: 1.27 CM
LA MAJOR: 4.6 CM
LA MINOR: 4.2 CM
LA WIDTH: 3.6 CM
LEFT ATRIUM SIZE: 3.2 CM
LEFT ATRIUM VOLUME INDEX MOD: 34 ML/M2
LEFT ATRIUM VOLUME INDEX: 29 ML/M2
LEFT ATRIUM VOLUME MOD: 50 ML
LEFT ATRIUM VOLUME: 43 CM3
LEFT INTERNAL DIMENSION IN SYSTOLE: 2.7 CM (ref 2.1–4)
LEFT VENTRICLE DIASTOLIC VOLUME INDEX: 54.36 ML/M2
LEFT VENTRICLE DIASTOLIC VOLUME: 81 ML
LEFT VENTRICLE MASS INDEX: 57.1 G/M2
LEFT VENTRICLE SYSTOLIC VOLUME INDEX: 17.4 ML/M2
LEFT VENTRICLE SYSTOLIC VOLUME: 26 ML
LEFT VENTRICULAR INTERNAL DIMENSION IN DIASTOLE: 4.2 CM (ref 3.5–6)
LEFT VENTRICULAR MASS: 85.1 G
LV LATERAL E/E' RATIO: 13.6
LV SEPTAL E/E' RATIO: 15.6
LYMPHOCYTES # BLD AUTO: 1.5 K/UL (ref 1–4.8)
MAGNESIUM SERPL-MCNC: 2.2 MG/DL (ref 1.6–2.6)
MCH RBC QN AUTO: 27.8 PG (ref 27–31)
MCHC RBC AUTO-ENTMCNC: 32.8 G/DL (ref 32–36)
MCV RBC AUTO: 85 FL (ref 82–98)
MV A" WAVE DURATION": 88.49 MS
MV PEAK A VEL: 0.78 M/S
MV PEAK E VEL: 1.09 M/S
NUCLEATED RBC (/100WBC) (OHS): 0 /100 WBC
OHS CV RV/LV RATIO: 0.86 CM
PISA TR MAX VEL: 2.9 M/S
PLATELET # BLD AUTO: 356 K/UL (ref 150–450)
PMV BLD AUTO: 9.5 FL (ref 9.2–12.9)
POTASSIUM SERPL-SCNC: 3.8 MMOL/L (ref 3.5–5.1)
PROT SERPL-MCNC: 6.4 GM/DL (ref 6–8.4)
PULM VEIN A" WAVE DURATION": 88.49 MS
PULM VEIN S/D RATIO: 0.67
PULMONIC VEIN PEAK A VELOCITY: 0.3 M/S
PV PEAK D VEL: 0.83 M/S
PV PEAK S VEL: 0.56 M/S
RA MAJOR: 3.91 CM
RA PRESSURE ESTIMATED: 3 MMHG
RA WIDTH: 3.03 CM
RBC # BLD AUTO: 3.24 M/UL (ref 4.6–6.2)
RELATIVE EOSINOPHIL (OHS): 5.1 %
RELATIVE LYMPHOCYTE (OHS): 14.3 % (ref 18–48)
RELATIVE MONOCYTE (OHS): 10.9 % (ref 4–15)
RELATIVE NEUTROPHIL (OHS): 67.6 % (ref 38–73)
RIGHT ATRIAL AREA: 11.2 CM2
RIGHT VENTRICLE DIASTOLIC BASEL DIMENSION: 3.6 CM
RV TB RVSP: 6 MMHG
RV TISSUE DOPPLER FREE WALL SYSTOLIC VELOCITY 1 (APICAL 4 CHAMBER VIEW): 12.47 CM/S
SINUS: 2.63 CM
SODIUM SERPL-SCNC: 133 MMOL/L (ref 136–145)
STJ: 2.4 CM
TDI LATERAL: 0.08 M/S
TDI SEPTAL: 0.07 M/S
TDI: 0.08 M/S
TRICUSPID ANNULAR PLANE SYSTOLIC EXCURSION: 2.4 CM
TSH SERPL-ACNC: 2.69 UIU/ML (ref 0.4–4)
TV PEAK GRADIENT: 32 MMHG
TV REST PULMONARY ARTERY PRESSURE: 37 MMHG
WBC # BLD AUTO: 10.48 K/UL (ref 3.9–12.7)
Z-SCORE OF LEFT VENTRICULAR DIMENSION IN END DIASTOLE: -0.51
Z-SCORE OF LEFT VENTRICULAR DIMENSION IN END SYSTOLE: -0.13

## 2025-05-07 PROCEDURE — 99204 OFFICE O/P NEW MOD 45 MIN: CPT | Mod: ,,, | Performed by: THORACIC SURGERY (CARDIOTHORACIC VASCULAR SURGERY)

## 2025-05-07 PROCEDURE — 80053 COMPREHEN METABOLIC PANEL: CPT

## 2025-05-07 PROCEDURE — 84443 ASSAY THYROID STIM HORMONE: CPT

## 2025-05-07 PROCEDURE — 36415 COLL VENOUS BLD VENIPUNCTURE: CPT

## 2025-05-07 PROCEDURE — 99223 1ST HOSP IP/OBS HIGH 75: CPT | Mod: ,,, | Performed by: INTERNAL MEDICINE

## 2025-05-07 PROCEDURE — 25000003 PHARM REV CODE 250

## 2025-05-07 PROCEDURE — 85025 COMPLETE CBC W/AUTO DIFF WBC: CPT

## 2025-05-07 PROCEDURE — G0378 HOSPITAL OBSERVATION PER HR: HCPCS

## 2025-05-07 PROCEDURE — 25000003 PHARM REV CODE 250: Performed by: HOSPITALIST

## 2025-05-07 PROCEDURE — 83735 ASSAY OF MAGNESIUM: CPT

## 2025-05-07 RX ORDER — POTASSIUM CHLORIDE 20 MEQ/1
20 TABLET, EXTENDED RELEASE ORAL ONCE
Status: COMPLETED | OUTPATIENT
Start: 2025-05-07 | End: 2025-05-07

## 2025-05-07 RX ADMIN — POTASSIUM CHLORIDE 20 MEQ: 1500 TABLET, EXTENDED RELEASE ORAL at 09:05

## 2025-05-07 RX ADMIN — LEVOTHYROXINE SODIUM 50 MCG: 0.05 TABLET ORAL at 06:05

## 2025-05-07 RX ADMIN — CARVEDILOL 12.5 MG: 12.5 TABLET, FILM COATED ORAL at 09:05

## 2025-05-07 RX ADMIN — SODIUM BICARBONATE 650 MG TABLET 1300 MG: at 09:05

## 2025-05-07 RX ADMIN — FINASTERIDE 5 MG: 5 TABLET, FILM COATED ORAL at 09:05

## 2025-05-07 RX ADMIN — SODIUM BICARBONATE 650 MG TABLET 1300 MG: at 08:05

## 2025-05-07 RX ADMIN — ESTRADIOL 3 MG: 2 TABLET ORAL at 09:05

## 2025-05-07 RX ADMIN — ESTRADIOL 3 MG: 2 TABLET ORAL at 08:05

## 2025-05-07 RX ADMIN — MINOXIDIL 2.5 MG: 2.5 TABLET ORAL at 08:05

## 2025-05-07 RX ADMIN — CARVEDILOL 12.5 MG: 12.5 TABLET, FILM COATED ORAL at 08:05

## 2025-05-07 RX ADMIN — CALCITRIOL CAPSULES 0.25 MCG 0.25 MCG: 0.25 CAPSULE ORAL at 09:05

## 2025-05-07 RX ADMIN — LOSARTAN POTASSIUM 25 MG: 25 TABLET, FILM COATED ORAL at 09:05

## 2025-05-07 RX ADMIN — MINOXIDIL 2.5 MG: 2.5 TABLET ORAL at 09:05

## 2025-05-07 NOTE — ASSESSMENT & PLAN NOTE
Anemia is likely due to chronic disease due to Chronic Kidney Disease. Most recent hemoglobin and hematocrit are listed below.  Recent Labs     05/06/25  1652   HGB 9.8*   HCT 29.4*     Plan  - Monitor serial CBC: Daily  - Transfuse PRBC if patient becomes hemodynamically unstable, symptomatic or H/H drops below 7/21.  - Patient has not received any PRBC transfusions to date  - Patient's anemia is currently stable

## 2025-05-07 NOTE — H&P
[facility] - Cardiology [facility] Medicine  History & Physical    Patient Name: Miquel Salcedo  MRN: [id number]  Patient Class: OP- Observation  Admission Date: 5/6/2025  Attending Physician: Neida Cespedes MD   Primary Care Provider: Janine Ventura FNP         Patient information was obtained from patient, parent, past medical records, and ER records.     Subjective:     Principal Problem:NAVARRO (dyspnea on exertion)    Chief Complaint:   Chief Complaint   Patient presents with    Chest Pain    Shortness of Breath        HPI: Ms. Fuad Salcedo is a 33 y/o transgender female with hx of NIDDM2, HLD, hypothyroidism, CKD3b, astrocytoma s/p  shunt who presented to the ED for evaluation of SOB and NAVARRO. Of note pt recently admitted for SOB and a productive cough, found to have a pneumonia, hypertensive urgency and volume overload with associated type 2 NSTEMI, as well as an LUTHER. Pt placed on course of empiric abx, diuresed with lasix and started on coreg. TTE obtained showing newly reduced EF 45-50% and severe AR. CHRIS during that admit performed 5/5 demonstrated bicuspid aortic valve with severe regurg and a moderately dilated ascending aortic root. CTS consulted, recommended surgical workup for valve repair. Pt discharged to home yesterday. Since DC, patient reports improvement in her cough, though continued profound NAVARRO to the point she can't even take a shower without getting significantly winded. Pt reports stable breathing at rest in bed, denies any current orthopnea, chest pain, palpitations.     In the ED, patient afebrile, SBP 160s, satting well on RA. ECG showing sinus tach  with LVH. CBC showing stable anemia of 9.8, no leukocytosis. Creatinine appears close to patient's current BL at 2.5. . HS-trop 454>369. CXR without acute findings. Cardiology consulted and patient admitted to  for continued management.     Past Medical History:   Diagnosis Date    Astrocytoma brain tumor      Diabetes mellitus     Hypothyroid     Macular degeneration     OU       Past Surgical History:   Procedure Laterality Date    CSF SHUNT      SOFT TISSUE TUMOR RESECTION      patient was about 5 yr of age       Review of patient's allergies indicates:  No Known Allergies    Current Facility-Administered Medications on File Prior to Encounter   Medication    [DISCONTINUED] GENERIC EXTERNAL MEDICATION     Current Outpatient Medications on File Prior to Encounter   Medication Sig    blood sugar diagnostic Strp Checks bg 1-2  times a day. Dispense with lancets. One touch ultra  mini meter used.    calcitRIOL (ROCALTROL) 0.25 MCG Cap Take 1 capsule (0.25 mcg total) by mouth once daily.    estradioL (ESTRACE) 2 MG tablet Take 1.5 tablets (3 mg total) by mouth 2 (two) times daily.    finasteride (PROSCAR) 5 mg tablet Take 1 tablet (5 mg total) by mouth once daily.    levothyroxine (SYNTHROID) 50 MCG tablet TAKE 1 TABLET(50 MCG) BY MOUTH BEFORE BREAKFAST    losartan (COZAAR) 25 MG tablet Take 1 tablet (25 mg total) by mouth once daily.    minoxidiL (LONITEN) 2.5 MG tablet Take 1 tablet (2.5 mg total) by mouth 2 (two) times daily.    sodium bicarbonate 650 MG tablet Take 2 tablets (1,300 mg total) by mouth 2 (two) times daily.    torsemide (DEMADEX) 10 MG Tab Take 1 tablet (10 mg total) by mouth once daily.     Family History       Problem Relation (Age of Onset)    Heart disease Father    Stroke Father          Tobacco Use    Smoking status: Former    Smokeless tobacco: Former   Substance and Sexual Activity    Alcohol use: Yes     Comment: rarely    Drug use: No    Sexual activity: Not Currently     Partners: Female, Male     Review of Systems   Respiratory:  Positive for cough and shortness of breath.    Cardiovascular:  Negative for chest pain, palpitations and leg swelling.     Objective:     Vital Signs (Most Recent):  Temp: 98 °F (36.7 °C) (05/06/25 1915)  Pulse: 98 (05/06/25 1915)  Resp: 18 (05/06/25 1915)  BP:  134/63 (05/06/25 1915)  SpO2: 98 % (05/06/25 1915) Vital Signs (24h Range):  Temp:  [97.9 °F (36.6 °C)-98 °F (36.7 °C)] 98 °F (36.7 °C)  Pulse:  [] 98  Resp:  [18-20] 18  SpO2:  [98 %-99 %] 98 %  BP: (134-161)/(63-69) 134/63     Weight: 54.4 kg (120 lb)  Body mass index is 24.24 kg/m².     Physical Exam  Vitals reviewed.   Constitutional:       Appearance: Normal appearance.   HENT:      Head: Normocephalic and atraumatic.      Mouth/Throat:      Mouth: Mucous membranes are dry.      Pharynx: Oropharynx is clear.   Eyes:      Extraocular Movements: Extraocular movements intact.      Pupils: Pupils are equal, round, and reactive to light.   Cardiovascular:      Rate and Rhythm: Normal rate and regular rhythm.      Pulses: Normal pulses.      Heart sounds: Normal heart sounds. No murmur heard.     No friction rub. No gallop.   Pulmonary:      Effort: Pulmonary effort is normal. No respiratory distress.      Breath sounds: Normal breath sounds.   Abdominal:      General: Abdomen is flat.      Tenderness: There is no abdominal tenderness.   Musculoskeletal:         General: Normal range of motion.      Cervical back: Normal range of motion and neck supple.      Right lower leg: No edema.      Left lower leg: No edema.   Skin:     General: Skin is warm and dry.      Coloration: Skin is pale.   Neurological:      General: No focal deficit present.      Mental Status: She is alert and oriented to person, place, and time.   Psychiatric:         Mood and Affect: Mood normal.         Behavior: Behavior normal.              CRANIAL NERVES     CN III, IV, VI   Pupils are equal, round, and reactive to light.       Significant Labs: All pertinent labs within the past 24 hours have been reviewed.  CBC:   Recent Labs   Lab 05/06/25  1652   WBC 11.24   HGB 9.8*   HCT 29.4*        CMP:   Recent Labs   Lab 05/05/25  0500 05/06/25  1652   * 131*   K 3.2* 4.2   CL  --  97   CO2 23 23   GLU  --  112*   BUN 42* 38*    CREATININE 2.56* 2.5*   CALCIUM 7.8* 8.7   PROT 7 7.2   ALBUMIN 3.3* 3.2*   BILITOT 0.3* 0.2   ALKPHOS 59 70   AST 16 31   ALT 21 40   ANIONGAP 12 11     Cardiac Markers:   Recent Labs   Lab 05/06/25  1652   *     Troponin:   Recent Labs   Lab 05/06/25  1652 05/06/25  1906   TROPONINIHS 454* 369*       Significant Imaging: I have reviewed all pertinent imaging results/findings within the past 24 hours.    X-Ray Chest 1 View   Final Result      As above         Electronically signed by: Shivam Cruz MD   Date:    05/06/2025   Time:    17:32          Assessment/Plan:     Assessment & Plan  NAVARRO (dyspnea on exertion)  Patient presenting with recurrent significant NAVARRO and SOB impairing her ability to exert herself even with ADLs. Pt appears euvolemic at this time, no e/o decompensated HF. C/f severe AR as etiology of patient's sxs.    Plan:  -- repeat TTE ordered on admission  -- cardiology consulted     Transwoman (she/her)   -- pronouns: she/her/hers  -- continue home estradiol and minoxidil on admission    Type 2 diabetes mellitus with retinopathy, without long-term current use of insulin  Patient's FSGs are controlled on current medication regimen.  Last A1c reviewed-   Lab Results   Component Value Date    HGBA1C 5.9 05/02/2025     Most recent fingerstick glucose reviewed-   Recent Labs   Lab 05/06/25 2114 05/06/25  2340   POCTGLUCOSE 113* 116*     Current correctional scale  Low  Maintain anti-hyperglycemic dose as follows-   Antihyperglycemics (From admission, onward)      Start     Stop Route Frequency Ordered    05/06/25 2141  insulin aspart U-100 pen 0-5 Units         -- SubQ Before meals & nightly PRN 05/06/25 2042          -- Hold Oral hypoglycemics while patient is in the hospital.  -- LDSSI as above, can adjust as clinically appropriate      Mixed dyslipidemia  -- most recent LDL 81, not currently on any lipid lowering therapy    Subclinical hypothyroidism  -- continue home synthroid 50mcg  --  repeat TSH ordered on admission    H/O astrocytoma  -- noted, s/p shunt placement     Secondary hyperparathyroidism of renal origin  -- continue home calcitriol on admission  -- calcium wnl on admission     CKD stage 3b, GFR 30-44 ml/min  Creatine stable for now. BMP reviewed- noted Estimated Creatinine Clearance: 30.7 mL/min (A) (based on SCr of 2.5 mg/dL (H)). according to latest data. Based on current GFR, CKD stage is stage 3 - GFR 30-59.  Monitor UOP and serial BMP and adjust therapy as needed. Renally dose meds. Avoid nephrotoxic medications and procedures.  Anemia in chronic kidney disease (CKD)  Anemia is likely due to chronic disease due to Chronic Kidney Disease. Most recent hemoglobin and hematocrit are listed below.  Recent Labs     05/06/25  1652   HGB 9.8*   HCT 29.4*     Plan  - Monitor serial CBC: Daily  - Transfuse PRBC if patient becomes hemodynamically unstable, symptomatic or H/H drops below 7/21.  - Patient has not received any PRBC transfusions to date  - Patient's anemia is currently stable  Hypertension  Patient's blood pressure range in the last 24 hours was: BP  Min: 118/57  Max: 161/69.The patient's inpatient anti-hypertensive regimen is listed below:  Current Antihypertensives  losartan tablet 25 mg, Daily, Oral  minoxidiL tablet 2.5 mg, 2 times daily, Oral  , 2 times daily, Oral  carvediloL tablet 12.5 mg, 2 times daily, Oral    Plan  -- BP is controlled, no changes needed to their regimen  -- continue home BP regimen    Chronic systolic heart failure  Patient with systolic HF (LVEF 45-50% at [facility]) s/o severe AR. Pt with recent admission with decompensated HF. On current admit patient appears overall euvolemic/volume down.     Patient has Systolic (HFrEF) heart failure that is Chronic. On presentation their CHF was well compensated. Most recent BNP and echo results are listed below.  Recent Labs     05/06/25  1652   *     Latest ECHO  No results found for this or any  previous visit.    Current Heart Failure Medications  losartan tablet 25 mg, Daily, Oral  , 2 times daily, Oral  carvediloL tablet 12.5 mg, 2 times daily, Oral    Plan  -- Monitor strict I&Os and daily weights.    -- Place on telemetry  -- Low sodium diet  -- Cardiology has been consulted  -- The patient's volume status is at their baseline    Severe aortic stenosis  Echocardiogram with evidence of aortic regurgitation that is severe . The patient's most recent echocardiogram result is listed below. We will manage the valvular abnormality by consulting cardiology.     Transesophageal echo (CHRIS)  Result Date: 5/5/2025  Huber Barnes MD     5/5/2025 10:14 AM  CHRIS (transesophageal echocardiogram) with monitored conscious   sedation    Indication -rule out bicuspid aortic valve, possible severe   aortic regurgitation    Impression  1.  successful CHRIS performed without complication. Findings of   CHRIS show bicuspid aortic valve, mild aortic valve stenosis,   severe aortic valve regurgitation with 2 jets one of them   eccentric, moderately dilated ascending aortic root measured at   4.2 cm to be correlated with CTA, full results of study will be   reported separately.    Plan:  -- cardiology consulted on admission, appreciate recs  -- repeat TTE pending    Elevated troponin  Patient with elevated HS-trop 454>369. Likely type 2 NSTEMI 2/2 demand ischemia s/o severe AR. ECG without ischemic changes, patient without any chest pain or discomfort. No indication for ACS protocol at this time.       VTE Risk Mitigation (From admission, onward)           Ordered     IP VTE LOW RISK PATIENT  Once         05/06/25 2042     Place sequential compression device  Until discontinued         05/06/25 2042                       On 05/07/2025, patient should be placed in hospital observation services under my care.         Erick Dougherty MD  Department of Hospital Medicine  [facility] - Cardiology Stepdown

## 2025-05-07 NOTE — ED NOTES
Report received from Shawna BEAVER RN. Assume care of pt. Pt resting comfortably and independently repositioned in stretcher with bed locked in lowest position for safety. NAD noted at this time. Respirations even and unlabored and visible chest rise noted.  Patient offered bathroom assistance and denies need at this time. Pt instructed to call if assistance is needed. Pt on continuous cardiac monitoring. Call light within reach. Family at bedside. No needs at this time. Will continue to monitor.

## 2025-05-07 NOTE — HPI
Ms. Fuad Salcedo is a 35 y/o transgender female with hx of NIDDM2, HLD, hypothyroidism, CKD3b, astrocytoma s/p  shunt who presented to the ED for evaluation of SOB and NAVARRO. Of note pt recently admitted for SOB and a productive cough, found to have a pneumonia, hypertensive urgency and volume overload with associated type 2 NSTEMI, as well as an LUTHER. Pt placed on course of empiric abx, diuresed with lasix and started on coreg. TTE obtained showing newly reduced EF 45-50% and severe AR. CHRIS during that admit performed 5/5 demonstrated bicuspid aortic valve with severe regurg and a moderately dilated ascending aortic root. CTS consulted, recommended surgical workup for valve repair. Pt discharged to home yesterday. Since DC, patient reports improvement in her cough, though continued profound NAVARRO to the point she can't even take a shower without getting significantly winded. Pt reports stable breathing at rest in bed, denies any current orthopnea, chest pain, palpitations.     In the ED, patient afebrile, SBP 160s, satting well on RA. ECG showing sinus tach  with LVH. CBC showing stable anemia of 9.8, no leukocytosis. Creatinine appears close to patient's current BL at 2.5. . HS-trop 454>369. CXR without acute findings. Cardiology consulted and patient admitted to  for continued management.

## 2025-05-07 NOTE — CARE UPDATE
Admitted overnight to  Team C.  Pt seen and examined this morning on rounds. FARIDEH.  Cards and CTS consulted, appreciate recs.  Unable to see the [place] d/c summary, CM assisting with obtaining per cards.  Discrepancy in Echos, so CTS ordered another Echo with contrast.  Also checking CT non-con. F/U CTS recs.     Care plan reviewed with pt and mother at bedside. Otherwise, doing well and with no further complaints at this time.

## 2025-05-07 NOTE — ASSESSMENT & PLAN NOTE
Patient with history DM2 controlled with lifestyle, CKD3, astrocytoma s/p  shunt, hyperparathyroidism, hypothyroidism, anemia who was recently discharged from [facility] after being admitted for SOB / fatigue. There was some initial concern the patient may have pneumonia but this was ruled out. Patient likely having a heart failure exacerbation. Started to slow down somewhat in January after the snow storm. Noticed some mild swelling and was placed on torsemide which she takes several times a week. No dizziness, syncope, chest pain. In the several weeks prior to admission was really having difficulty breathing. CHRIS at OSH noted a bicuspid aortic valve with severe AR with 2 jets, one eccentric and dilated ascending aorta at 4.2cm. Patient has known about the bicuspid valve  for some time but not a pertinent leak. Patient was told they needed surgery immediately which was understandably frightening. Was discharge home basically on bed rest. Took a shower and was really winded after. Presented for second opinion. Surface echo today with EF 55%, moderate AR and mild AS (HARRIS 1.2, MG 14, PV 2.7), ascending aorta 4cm, LV 4.2cm.  No prior strokes, seizures, blood clots, stents, or sternotomies. Regarding history of DM, reports not on medication for this as her A1C last year was 5. Kidney function has been stably bad for the last several years with creatinine in the 2s. There was plan for a renal biopsy which has been postponed due to these admissions. No issues with  shunt.    Due to such conflicting echo findings, will plan to repeat an echo with contrast and a CT scan here to better delineate what is going on. Staffed with Dr. Marcum.

## 2025-05-07 NOTE — SUBJECTIVE & OBJECTIVE
Past Medical History:   Diagnosis Date    Astrocytoma brain tumor     Diabetes mellitus     Hypothyroid     Macular degeneration     OU       Past Surgical History:   Procedure Laterality Date    CSF SHUNT      SOFT TISSUE TUMOR RESECTION      patient was about 5 yr of age       Review of patient's allergies indicates:  No Known Allergies    Current Facility-Administered Medications on File Prior to Encounter   Medication    [DISCONTINUED] GENERIC EXTERNAL MEDICATION     Current Outpatient Medications on File Prior to Encounter   Medication Sig    aspirin (ECOTRIN) 81 MG EC tablet Take 81 mg by mouth once daily.    carvediloL (COREG) 12.5 MG tablet Take 12.5 mg by mouth 2 (two) times daily.    blood sugar diagnostic Strp Checks bg 1-2  times a day. Dispense with lancets. One touch ultra  mini meter used.    calcitRIOL (ROCALTROL) 0.25 MCG Cap Take 1 capsule (0.25 mcg total) by mouth once daily.    estradioL (ESTRACE) 2 MG tablet Take 1.5 tablets (3 mg total) by mouth 2 (two) times daily.    finasteride (PROSCAR) 5 mg tablet Take 1 tablet (5 mg total) by mouth once daily.    levothyroxine (SYNTHROID) 50 MCG tablet TAKE 1 TABLET(50 MCG) BY MOUTH BEFORE BREAKFAST    losartan (COZAAR) 25 MG tablet Take 1 tablet (25 mg total) by mouth once daily.    minoxidiL (LONITEN) 2.5 MG tablet Take 1 tablet (2.5 mg total) by mouth 2 (two) times daily.    sodium bicarbonate 650 MG tablet Take 2 tablets (1,300 mg total) by mouth 2 (two) times daily.    torsemide (DEMADEX) 10 MG Tab Take 1 tablet (10 mg total) by mouth once daily.     Family History       Problem Relation (Age of Onset)    Heart disease Father    Stroke Father          Tobacco Use    Smoking status: Former    Smokeless tobacco: Former   Substance and Sexual Activity    Alcohol use: Yes     Comment: rarely    Drug use: No    Sexual activity: Not Currently     Partners: Female, Male     Review of Systems   Constitutional: Positive for malaise/fatigue. Negative for  chills and fever.   Cardiovascular:  Positive for dyspnea on exertion. Negative for chest pain, irregular heartbeat, leg swelling, near-syncope, orthopnea, palpitations and syncope.   Respiratory:  Negative for shortness of breath.    Gastrointestinal:  Positive for bloating. Negative for abdominal pain, nausea and vomiting.   Neurological:  Positive for weakness. Negative for light-headedness.     Objective:     Vital Signs (Most Recent):  Temp: 97.3 °F (36.3 °C) (05/06/25 2324)  Pulse: 99 (05/07/25 0000)  Resp: 18 (05/06/25 2324)  BP: (!) 118/57 (05/06/25 2324)  SpO2: 98 % (05/06/25 2324) Vital Signs (24h Range):  Temp:  [97.3 °F (36.3 °C)-99.2 °F (37.3 °C)] 97.3 °F (36.3 °C)  Pulse:  [] 99  Resp:  [16-20] 18  SpO2:  [98 %-99 %] 98 %  BP: (118-161)/(57-82) 118/57     Weight: 54.8 kg (120 lb 13 oz)  Body mass index is 24.4 kg/m².    SpO2: 98 %         Intake/Output Summary (Last 24 hours) at 5/7/2025 0225  Last data filed at 5/6/2025 2320  Gross per 24 hour   Intake 240 ml   Output --   Net 240 ml       Lines/Drains/Airways       Peripheral Intravenous Line  Duration                  Peripheral IV - Single Lumen 05/06/25 1653 20 G Left Forearm <1 day                     Physical Exam  Constitutional:       Appearance: Normal appearance.   HENT:      Mouth/Throat:      Mouth: Mucous membranes are moist.   Eyes:      Extraocular Movements: Extraocular movements intact.      Conjunctiva/sclera: Conjunctivae normal.   Cardiovascular:      Rate and Rhythm: Normal rate and regular rhythm.      Comments: JVD above edge of clavicle  Pulmonary:      Effort: Pulmonary effort is normal.   Abdominal:      General: Abdomen is flat.      Palpations: Abdomen is soft.   Musculoskeletal:      Right lower leg: No edema.      Left lower leg: No edema.   Skin:     General: Skin is warm and dry.   Neurological:      General: No focal deficit present.      Mental Status: She is alert and oriented to person, place, and time.         "  Significant Labs: CMP   Recent Labs   Lab 05/05/25  0500 05/06/25  1652   * 131*   K 3.2* 4.2   CL  --  97   CO2 23 23   GLU  --  112*   BUN 42* 38*   CREATININE 2.56* 2.5*   CALCIUM 7.8* 8.7   PROT 7 7.2   ALBUMIN 3.3* 3.2*   BILITOT 0.3* 0.2   ALKPHOS 59 70   AST 16 31   ALT 21 40   ANIONGAP 12 11   , CBC   Recent Labs   Lab 05/06/25  1652   WBC 11.24   HGB 9.8*   HCT 29.4*      , and INR No results for input(s): "INR", "PROTIME" in the last 48 hours.    Significant Imaging: X-Ray: CXR: X-Ray Chest 1 View (CXR):   Results for orders placed or performed during the hospital encounter of 05/06/25   X-Ray Chest 1 View    Narrative    EXAMINATION:  XR CHEST 1 VIEW    CLINICAL HISTORY:  shortness of breath;    TECHNIQUE:  Single frontal view of the chest was performed.    COMPARISON:  None    FINDINGS:  The cardiomediastinal silhouette is not enlarged.  There is no pleural effusion.  The trachea is midline.  The lungs are symmetrically expanded bilaterally without evidence of acute parenchymal process. No large focal consolidation seen.  There is no pneumothorax.  The osseous structures are unremarkable.  Shunt catheter tubing descends along the left aspect of the chest wall and left in, crosses midline, and descends along the right.  No kink or disruption.      Impression    As above      Electronically signed by: Shivam Cruz MD  Date:    05/06/2025  Time:    17:32     "

## 2025-05-07 NOTE — SUBJECTIVE & OBJECTIVE
Past Medical History:   Diagnosis Date    Astrocytoma brain tumor     Diabetes mellitus     Hypothyroid     Macular degeneration     OU       Past Surgical History:   Procedure Laterality Date    CSF SHUNT      SOFT TISSUE TUMOR RESECTION      patient was about 5 yr of age       Review of patient's allergies indicates:  No Known Allergies    Current Facility-Administered Medications on File Prior to Encounter   Medication    [DISCONTINUED] GENERIC EXTERNAL MEDICATION     Current Outpatient Medications on File Prior to Encounter   Medication Sig    blood sugar diagnostic Strp Checks bg 1-2  times a day. Dispense with lancets. One touch ultra  mini meter used.    calcitRIOL (ROCALTROL) 0.25 MCG Cap Take 1 capsule (0.25 mcg total) by mouth once daily.    estradioL (ESTRACE) 2 MG tablet Take 1.5 tablets (3 mg total) by mouth 2 (two) times daily.    finasteride (PROSCAR) 5 mg tablet Take 1 tablet (5 mg total) by mouth once daily.    levothyroxine (SYNTHROID) 50 MCG tablet TAKE 1 TABLET(50 MCG) BY MOUTH BEFORE BREAKFAST    losartan (COZAAR) 25 MG tablet Take 1 tablet (25 mg total) by mouth once daily.    minoxidiL (LONITEN) 2.5 MG tablet Take 1 tablet (2.5 mg total) by mouth 2 (two) times daily.    sodium bicarbonate 650 MG tablet Take 2 tablets (1,300 mg total) by mouth 2 (two) times daily.    torsemide (DEMADEX) 10 MG Tab Take 1 tablet (10 mg total) by mouth once daily.     Family History       Problem Relation (Age of Onset)    Heart disease Father    Stroke Father          Tobacco Use    Smoking status: Former    Smokeless tobacco: Former   Substance and Sexual Activity    Alcohol use: Yes     Comment: rarely    Drug use: No    Sexual activity: Not Currently     Partners: Female, Male     Review of Systems   Respiratory:  Positive for cough and shortness of breath.    Cardiovascular:  Negative for chest pain, palpitations and leg swelling.     Objective:     Vital Signs (Most Recent):  Temp: 98 °F (36.7 °C) (05/06/25  1915)  Pulse: 98 (05/06/25 1915)  Resp: 18 (05/06/25 1915)  BP: 134/63 (05/06/25 1915)  SpO2: 98 % (05/06/25 1915) Vital Signs (24h Range):  Temp:  [97.9 °F (36.6 °C)-98 °F (36.7 °C)] 98 °F (36.7 °C)  Pulse:  [] 98  Resp:  [18-20] 18  SpO2:  [98 %-99 %] 98 %  BP: (134-161)/(63-69) 134/63     Weight: 54.4 kg (120 lb)  Body mass index is 24.24 kg/m².     Physical Exam  Vitals reviewed.   Constitutional:       Appearance: Normal appearance.   HENT:      Head: Normocephalic and atraumatic.      Mouth/Throat:      Mouth: Mucous membranes are dry.      Pharynx: Oropharynx is clear.   Eyes:      Extraocular Movements: Extraocular movements intact.      Pupils: Pupils are equal, round, and reactive to light.   Cardiovascular:      Rate and Rhythm: Normal rate and regular rhythm.      Pulses: Normal pulses.      Heart sounds: Normal heart sounds. No murmur heard.     No friction rub. No gallop.   Pulmonary:      Effort: Pulmonary effort is normal. No respiratory distress.      Breath sounds: Normal breath sounds.   Abdominal:      General: Abdomen is flat.      Tenderness: There is no abdominal tenderness.   Musculoskeletal:         General: Normal range of motion.      Cervical back: Normal range of motion and neck supple.      Right lower leg: No edema.      Left lower leg: No edema.   Skin:     General: Skin is warm and dry.      Coloration: Skin is pale.   Neurological:      General: No focal deficit present.      Mental Status: She is alert and oriented to person, place, and time.   Psychiatric:         Mood and Affect: Mood normal.         Behavior: Behavior normal.              CRANIAL NERVES     CN III, IV, VI   Pupils are equal, round, and reactive to light.       Significant Labs: All pertinent labs within the past 24 hours have been reviewed.  CBC:   Recent Labs   Lab 05/06/25  1652   WBC 11.24   HGB 9.8*   HCT 29.4*        CMP:   Recent Labs   Lab 05/05/25  0500 05/06/25  1652   * 131*   K 3.2*  4.2   CL  --  97   CO2 23 23   GLU  --  112*   BUN 42* 38*   CREATININE 2.56* 2.5*   CALCIUM 7.8* 8.7   PROT 7 7.2   ALBUMIN 3.3* 3.2*   BILITOT 0.3* 0.2   ALKPHOS 59 70   AST 16 31   ALT 21 40   ANIONGAP 12 11     Cardiac Markers:   Recent Labs   Lab 05/06/25  1652   *     Troponin:   Recent Labs   Lab 05/06/25  1652 05/06/25  1906   TROPONINIHS 454* 369*       Significant Imaging: I have reviewed all pertinent imaging results/findings within the past 24 hours.    X-Ray Chest 1 View   Final Result      As above         Electronically signed by: Shivam Cruz MD   Date:    05/06/2025   Time:    17:32

## 2025-05-07 NOTE — HPI
Miquel Salcedo is a 34 y.o. transgender female with a history of CKD3, T2DM, bicuspid AV, astrocytoma s/p  shunt, hyperparathyroidism, and hypothyroidism who presented with NAVARRO and fatigue. Cardiology consulted for NAVARRO and AI.      Patient recently admitted ot OSH from 05/01-05 after presenting with a dry cough and NAVARRO. While admitted she was treated for HTN urgency, Type II NSTEMI, LUTHER on CKD, and PNA vs pulmonary edema/volume overload. TTE EF 45-50% and severe AI. CHRIS then obtained and noted bicuspid AV with mild AS and severe AI, along with moderate aortic root dilation of 4.2cm. CTS consulted and planned for outpatient workup for surgical AV repair. She was diuresed with IV lasix and discharged on prior home dose of PRN Torsemide.      On discharge, she reports that she had improved symptomatically, though still with mild NAVARRO. This progressed over the next day with dyspnea with minimal activity. She reports some abdominal distension as well.      Patient reports that she first noted LE edema and abdominal distension earlier this year. This was followed by dyspnea with exertion. She is prescribed PRN Torsemide outpatient and takes it 1-2 times weekly with improvement in symptoms.

## 2025-05-07 NOTE — HPI
Miquel Salcedo is a 34 y.o. transgender female with a history of CKD3, T2DM, bicuspid AV, astrocytoma s/p  shunt with endocrine complications, hyperparathyroidism, and hypothyroidism who presented with NAVARRO and fatigue. Cardiology consulted for NAVARRO and AI.     Patient recently admitted ot OSH from 05/01-05 after presenting with a dry cough and NAVARRO. While admitted she was treated for HTN urgency, Type II NSTEMI, LUTHER on CKD, and PNA vs pulmonary edema/volume overload. TTE EF 45-50% and severe AI. CHRIS then obtained and noted bicuspid AV with mild AS and severe AI, along with moderate aortic root dilation of 4.2cm. CTS consulted and planned for outpatient workup for surgical AV repair. She was diuresed with IV lasix and discharged on prior home dose of PRN Torsemide.     On discharge, she reports that she had improved symptomatically, though still with mild NAVARRO. This progressed over the next day with dyspnea with minimal activity. She reports some abdominal distension as well. Denies dyspnea at rest, LE edema, orthopnea, chest pain, pre-syncope, syncope.     Patient reports that she first noted LE edema and abdominal distension earlier this year. This was followed by dyspnea with exertion. She is prescribed PRN Torsemide outpatient and takes it 1-2 times weekly with improvement in symptoms.      ASA 81  Coreg 12.5 BID  Estrogen  finasteride  Levothyroxine  Losartan  Minoxidil  Bicarb  Torsemide 10

## 2025-05-07 NOTE — ASSESSMENT & PLAN NOTE
Patient with elevated HS-trop [id number]. Likely type 2 NSTEMI 2/2 demand ischemia s/o severe AR. ECG without ischemic changes, patient without any chest pain or discomfort. No indication for ACS protocol at this time.

## 2025-05-07 NOTE — ED NOTES
Telemetry Verification   Patient placed on Telemetry Box  Verified with War Room  Box # [id number]   Monitor Tech darin   Rate 105   Rhythm Sinus Tach
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
## 2025-05-07 NOTE — PLAN OF CARE
SW went to bedside to attempt initial assessment but pt was meeting with a provider.  Will attempt again later.      Janine Bustamante LMSW  [facility]  [id number]

## 2025-05-07 NOTE — PLAN OF CARE
Pt maintained free from falls/trauma/injuries. Pt denied pain or discomfort. Plan of care reviewed. Pt verbalized understanding. All questions and concerns addressed. VSS with call light and belongings within reach.    Problem: Adult Inpatient Plan of Care  Goal: Plan of Care Review  Outcome: Progressing  Goal: Patient-Specific Goal (Individualized)  Outcome: Progressing     Problem: Diabetes Comorbidity  Goal: Blood Glucose Level Within Targeted Range  Outcome: Progressing

## 2025-05-07 NOTE — PLAN OF CARE
[facility] - [facility]iology Stepdown  Initial Discharge Assessment       Primary Care Provider: Janine Ventura FNP    Admission Diagnosis: Shortness of breath [R06.02]  Chest pain [R07.9]    Admission Date: 5/6/2025  Expected Discharge Date: 5/8/2025    Transition of Care Barriers: None    Payor: BLUE CROSS BLUE SHIELD / Plan: [place] HMO / Product Type: HMO /     Extended Emergency Contact Information  Primary Emergency Contact: Latrell Salcedo   [place]  Mobile Phone: [phone]  Relation: Brother  Secondary Emergency Contact: Muriel Salcedo [place]  Mobile Phone: [phone]  Relation: Mother    Discharge Plan A: Home with family  Discharge Plan B: Home      [id number] [place] - [address] [facility] [address] [facility] [address] [place]  [address] [facility] [address]  Phone: [phone] Fax: [phone]    [id number] [address] [address]  [address] [address] [address]  Phone: [phone] Fax: [phone]    [id number] - [place] - [address] AT [facility] [address]placed by [facility]  [address]  [facility] [address]  Phone: [phone] Fax: [phone]    [facility] - [place] - [address] [address]  Phone: [phone] Fax: [phone]      Initial Assessment (most recent)       Adult Discharge Assessment - 05/07/25 1533          Discharge Assessment    Assessment Type Discharge Planning Assessment     Confirmed/corrected address, phone number and insurance Yes     Confirmed Demographics Correct on Facesheet     Source of Information patient;family;health record     Communicated LORENZO with patient/caregiver Yes     Reason For Admission NAVARRO     People in Home alone     Do you expect to return to your current living situation? Yes     Do you have help at home or someone to help you manage your care at home? Yes     Who  are your caregiver(s) and their phone number(s)? Latrell Salcedo (brother) [phone], Muriel Salcedo (mother) [phone]     Prior to hospitilization cognitive status: Alert/Oriented     Current cognitive status: Alert/Oriented     Walking or Climbing Stairs Difficulty no     Dressing/Bathing Difficulty no     Home Layout Able to live on 1st floor     Equipment Currently Used at Home none     Readmission within 30 days? No     Patient currently being followed by outpatient case management? No     Do you currently have service(s) that help you manage your care at home? No     Do you take prescription medications? Yes     Do you have prescription coverage? Yes     Do you have any problems affording any of your prescribed medications? No     Is the patient taking medications as prescribed? yes     Who is going to help you get home at discharge? Family     How do you get to doctors appointments? car, drives self     Are you on dialysis? No     Do you take coumadin? No     Discharge Plan A Home with family     Discharge Plan B Home     Discharge Plan discussed with: Patient;Sibling     Name(s) and Number(s) Latrell Salcedo (brother) [phone]     Transition of Care Barriers None                   SW met with pt and brother Latrell at bedside to discuss discharge planning.  Pt lives alone in a one-story house and is independent with ambulation and ADLs.  No DME, HH, dialysis, or coumadin.  Pt will have transportation and assistance from family at discharge, stating that she might go stay with family.  Discharge Plan A and Plan B have been determined by review of patient's clinical status, future medical and therapeutic needs, and coverage/benefits for post-acute care in coordination with multidisciplinary team members.  RAMOS name and ext on whiteboard.  Will continue to follow.      Janine Bustamante, MARLON  [facility]  [id number]

## 2025-05-07 NOTE — ASSESSMENT & PLAN NOTE
Patient presenting with recurrent significant NAVARRO and SOB impairing her ability to exert herself even with ADLs. Pt appears euvolemic at this time, no e/o decompensated HF. C/f severe AR as etiology of patient's sxs.    Plan:  -- repeat TTE ordered on admission  -- cardiology consulted

## 2025-05-07 NOTE — DISCHARGE INSTRUCTIONS
When you follow up in clinic (cardiology or cardiothoracic surgery), please bring a disc with the imaging from your recent hospitalization

## 2025-05-07 NOTE — ASSESSMENT & PLAN NOTE
Echocardiogram with evidence of aortic regurgitation that is severe . The patient's most recent echocardiogram result is listed below. We will manage the valvular abnormality by consulting cardiology.     Transesophageal echo (CHRIS)  Result Date: 5/5/2025  Huber Barnes MD     5/5/2025 10:14 AM  CHRIS (transesophageal echocardiogram) with monitored conscious   sedation    Indication -rule out bicuspid aortic valve, possible severe   aortic regurgitation    Impression  1.  successful CHRIS performed without complication. Findings of   CHRIS show bicuspid aortic valve, mild aortic valve stenosis,   severe aortic valve regurgitation with 2 jets one of them   eccentric, moderately dilated ascending aortic root measured at   4.2 cm to be correlated with CTA, full results of study will be   reported separately.    Plan:  -- cardiology consulted on admission, appreciate recs  -- repeat TTE pending

## 2025-05-07 NOTE — ASSESSMENT & PLAN NOTE
Patient's FSGs are controlled on current medication regimen.  Last A1c reviewed-   Lab Results   Component Value Date    HGBA1C 5.9 05/02/2025     Most recent fingerstick glucose reviewed-   Recent Labs   Lab 05/06/25 2114 05/06/25  2340   POCTGLUCOSE 113* 116*     Current correctional scale  Low  Maintain anti-hyperglycemic dose as follows-   Antihyperglycemics (From admission, onward)      Start     Stop Route Frequency Ordered    05/06/25 2141  insulin aspart U-100 pen 0-5 Units         -- SubQ Before meals & nightly PRN 05/06/25 2042          -- Hold Oral hypoglycemics while patient is in the hospital.  -- LDSSI as above, can adjust as clinically appropriate

## 2025-05-07 NOTE — ASSESSMENT & PLAN NOTE
Patient's blood pressure range in the last 24 hours was: BP  Min: 118/57  Max: 161/69.The patient's inpatient anti-hypertensive regimen is listed below:  Current Antihypertensives  losartan tablet 25 mg, Daily, Oral  minoxidiL tablet 2.5 mg, 2 times daily, Oral  , 2 times daily, Oral  carvediloL tablet 12.5 mg, 2 times daily, Oral    Plan  -- BP is controlled, no changes needed to their regimen  -- continue home BP regimen

## 2025-05-07 NOTE — ASSESSMENT & PLAN NOTE
Patient with systolic HF (LVEF 45-50% at [facility]) s/o severe AR. Pt with recent admission with decompensated HF. On current admit patient appears overall euvolemic/volume down.     Patient has Systolic (HFrEF) heart failure that is Chronic. On presentation their CHF was well compensated. Most recent BNP and echo results are listed below.  Recent Labs     05/06/25  1652   *     Latest ECHO  No results found for this or any previous visit.    Current Heart Failure Medications  losartan tablet 25 mg, Daily, Oral  , 2 times daily, Oral  carvediloL tablet 12.5 mg, 2 times daily, Oral    Plan  -- Monitor strict I&Os and daily weights.    -- Place on telemetry  -- Low sodium diet  -- Cardiology has been consulted  -- The patient's volume status is at their baseline

## 2025-05-07 NOTE — SUBJECTIVE & OBJECTIVE
Scheduled Meds:   calcitRIOL  0.25 mcg Oral Daily    carvediloL  12.5 mg Oral BID    estradioL  3 mg Oral BID    finasteride  5 mg Oral Daily    levothyroxine  50 mcg Oral Before breakfast    losartan  25 mg Oral Daily    minoxidiL  2.5 mg Oral BID    sodium bicarbonate  1,300 mg Oral BID     Continuous Infusions:  PRN Meds:.  Current Facility-Administered Medications:     dextrose 50%, 12.5 g, Intravenous, PRN    dextrose 50%, 25 g, Intravenous, PRN    glucagon (human recombinant), 1 mg, Intramuscular, PRN    glucose, 16 g, Oral, PRN    glucose, 24 g, Oral, PRN    naloxone, 0.02 mg, Intravenous, PRN    sodium chloride 0.9%, 10 mL, Intravenous, Q12H PRN      Current Outpatient Medications on File Prior to Encounter   Medication Sig    aspirin (ECOTRIN) 81 MG EC tablet Take 81 mg by mouth once daily.    carvediloL (COREG) 12.5 MG tablet Take 12.5 mg by mouth 2 (two) times daily.    blood sugar diagnostic Strp Checks bg 1-2  times a day. Dispense with lancets. One touch ultra  mini meter used.    calcitRIOL (ROCALTROL) 0.25 MCG Cap Take 1 capsule (0.25 mcg total) by mouth once daily.    estradioL (ESTRACE) 2 MG tablet Take 1.5 tablets (3 mg total) by mouth 2 (two) times daily.    finasteride (PROSCAR) 5 mg tablet Take 1 tablet (5 mg total) by mouth once daily.    levothyroxine (SYNTHROID) 50 MCG tablet TAKE 1 TABLET(50 MCG) BY MOUTH BEFORE BREAKFAST    losartan (COZAAR) 25 MG tablet Take 1 tablet (25 mg total) by mouth once daily.    minoxidiL (LONITEN) 2.5 MG tablet Take 1 tablet (2.5 mg total) by mouth 2 (two) times daily.    sodium bicarbonate 650 MG tablet Take 2 tablets (1,300 mg total) by mouth 2 (two) times daily.    torsemide (DEMADEX) 10 MG Tab Take 1 tablet (10 mg total) by mouth once daily.       Review of patient's allergies indicates:  No Known Allergies    Past Medical History:   Diagnosis Date    Astrocytoma brain tumor     Diabetes mellitus     Hypothyroid     Macular degeneration     OU     Past  Surgical History:   Procedure Laterality Date    CSF SHUNT      SOFT TISSUE TUMOR RESECTION      patient was about 5 yr of age     Family History       Problem Relation (Age of Onset)    Heart disease Father    Stroke Father          Tobacco Use    Smoking status: Former    Smokeless tobacco: Former   Substance and Sexual Activity    Alcohol use: Yes     Comment: rarely    Drug use: No    Sexual activity: Not Currently     Partners: Female, Male     Review of Systems   Constitutional:  Positive for activity change and fatigue.   HENT:  Negative for nosebleeds.    Eyes:  Negative for visual disturbance.   Respiratory:  Positive for shortness of breath.    Cardiovascular:  Positive for leg swelling (intermittent). Negative for chest pain and palpitations.   Gastrointestinal:  Negative for nausea.   Musculoskeletal:  Negative for gait problem.   Skin:  Negative for color change.   Neurological:  Negative for dizziness, seizures, syncope and headaches.   Hematological:  Does not bruise/bleed easily.   Psychiatric/Behavioral:  Negative for sleep disturbance.    Objective:     Vital Signs (Most Recent):  Temp: 98.6 °F (37 °C) (05/07/25 1455)  Pulse: 89 (05/07/25 1455)  Resp: 20 (05/07/25 1455)  BP: (!) 148/66 (05/07/25 1455)  SpO2: 99 % (05/07/25 1455) Vital Signs (24h Range):  Temp:  [97.3 °F (36.3 °C)-99.2 °F (37.3 °C)] 98.6 °F (37 °C)  Pulse:  [] 89  Resp:  [16-20] 20  SpO2:  [95 %-99 %] 99 %  BP: (118-161)/(56-82) 148/66     Weight: 54.8 kg (120 lb 13 oz)  Body mass index is 24.4 kg/m².    SpO2: 99 %        Intake/Output - Last 3 Shifts         05/05 0700 05/06 0659 05/06 0700  05/07 0659 05/07 0700  05/08 0659    P.O.  240     Total Intake(mL/kg)  240 (4.4)     Urine (mL/kg/hr)   300 (0.7)    Total Output   300    Net  +240 -300                    Lines/Drains/Airways       Peripheral Intravenous Line  Duration                  Peripheral IV - Single Lumen 05/06/25 1653 20 G Left Forearm <1 day               "            Physical Exam  Vitals reviewed.   Constitutional:       General: She is not in acute distress.     Appearance: She is well-developed. She is not diaphoretic.   HENT:      Head: Normocephalic and atraumatic.   Eyes:      Conjunctiva/sclera: Conjunctivae normal.   Neck:      Vascular: No JVD.   Cardiovascular:      Rate and Rhythm: Normal rate and regular rhythm.   Pulmonary:      Effort: Pulmonary effort is normal. No respiratory distress.   Musculoskeletal:         General: Normal range of motion.      Cervical back: Normal range of motion.   Skin:     Coloration: Skin is not pale.   Neurological:      General: No focal deficit present.      Mental Status: She is alert.   Psychiatric:         Speech: Speech normal.         Behavior: Behavior normal.         Thought Content: Thought content normal.         Judgment: Judgment normal.        Significant Labs:  Amylase: No results for input(s): "AMYLASE" in the last 48 hours.  BMP:   Recent Labs   Lab 05/07/25  0539   *   *   K 3.8   CL 97   CO2 25   BUN 39*   CREATININE 2.5*   CALCIUM 8.8   MG 2.2     Cardiac markers: No results for input(s): "CKMB", "CPKMB", "TROPONINT", "TROPONINI", "MYOGLOBIN" in the last 48 hours.  CBC:   Recent Labs   Lab 05/07/25  0539   WBC 10.48   RBC 3.24*   HGB 9.0*   HCT 27.4*      MCV 85   MCH 27.8   MCHC 32.8     CMP:   Recent Labs   Lab 05/07/25  0539   *   CALCIUM 8.8   ALBUMIN 2.9*   PROT 6.4   *   K 3.8   CO2 25   CL 97   BUN 39*   CREATININE 2.5*   ALKPHOS 65   ALT 34   AST 23   BILITOT 0.2     Coagulation: No results for input(s): "PT", "INR", "APTT" in the last 48 hours.  Lactic Acid: No results for input(s): "LACTATE" in the last 48 hours.  LFTs:   Recent Labs   Lab 05/07/25  0539   ALT 34   AST 23   ALKPHOS 65   BILITOT 0.2   PROT 6.4   ALBUMIN 2.9*     Lipase: No results for input(s): "LIPASE" in the last 48 hours.    Significant Diagnostics:  TTE 5/7/25    Left Ventricle: The left " ventricle is normal in size. Normal wall thickness. Normal wall motion. There is normal systolic function with a visually estimated ejection fraction of 55 - 60%. Ejection fraction is approximately 60%. Grade II diastolic dysfunction.    Right Ventricle: The right ventricle is normal in size. Wall thickness is normal. Systolic function is normal.    Aortic Valve: The aortic valve may be a  bicuspid valve but do not see leaflets clearly. There is moderate aortic valve sclerosis. Mildly restricted motion. There is moderate stenosis. Aortic valve area by VTI is 1.2 cm². Aortic valve peak velocity is 2.7 m/s. Mean gradient is 14 mmHg. The dimensionless index is 0.43. There is mild aortic regurgitation.    Mitral Valve: There is trace regurgitation.    Tricuspid Valve: There is mild regurgitation.    Aorta: The aortic root is normal in size measuring 2.63 cm. The ascending aorta is mildly to moderately dilated measuring 4.0 cm.    Pulmonary Artery: The estimated pulmonary artery systolic pressure is 37 mmHg.    IVC/SVC: Normal venous pressure at 3 mmHg.  LV 4.2  TAPSE 2.4    OSH CHRIS 5/5/25  1.  successful CHRIS performed without complication. Findings of   CHRIS show bicuspid aortic valve, mild aortic valve stenosis,   severe aortic valve regurgitation with 2 jets one of them   eccentric, moderately dilated ascending aortic root measured at   4.2 cm to be correlated with CTA, full results of study will be   reported separately.     OSH CT 5/5/25 (no images for review)   The lungs are clear.  No infiltrates or effusions.  Ascending aortic aneurysm measuring up to 4 cm in diameter.  Aortic atherosclerosis.  No mediastinal, hilar, or axillary lymphadenopathy.     Abdomen:   Left-sided  shunt with the distal tip in the pelvis.  Trace free pelvic fluid.   No hydronephrosis, urolithiasis, or perinephric stranding.     The liver is normal in size and contour.     The spleen and pancreas have a normal non-contrasted appearance.      The adrenal glands are within normal limits.     The gallbladder is unremarkable.     No free intraperitoneal fluid or air.  The bowel is within normal limits.  The appendix is normal. No abdominal lymphadenopathy.

## 2025-05-07 NOTE — ASSESSMENT & PLAN NOTE
34 y.o. transgender female with history significant for bicuspid AV, CKD3, and newly diagnosed HFmrEF and severe AI. Cardiology consulted for NAVARRO and severe AI.    Patient recently admitted to OSH with new onset heart failure exacerbation in the s/o newly diagnosed severe AI with known bicuspid valve. Diuresed and discharged, though presenting to [facility] with ongoing NAVARRO. , significantly improved from prior hospitalization (1361). Trop 454 -> 369, due to demand. Cr at baseline. JVD only mildly elevated on exam. Overall, patient near euvolemia.     Recommendations:  - Careful diuresis  - TTE  - Consult CTS, may be able to complete some of the surgical workup inpatient   - Strict I&Os, weights qd

## 2025-05-07 NOTE — CONSULTS
[facility] Step[facility]  Cardiology  Consult Note    Patient Name: Miquel Salcedo  MRN: [id number]  Admission Date: 5/6/2025  Hospital Length of Stay: 0 days  Code Status: Full Code   Attending Provider: Neida Cespedes MD   Consulting Provider: Aleta Ferro MD  Primary Care Physician: Janine Ventura FNP  Principal Problem:NAVARRO (dyspnea on exertion)    Patient information was obtained from patient, past medical records, and ER records.     Inpatient consult to Cardiology  Consult performed by: Aleta Ferro MD  Consult ordered by: Samad, Mawadah, MD  Reason for consult: Severe AI        Subjective:     Chief Complaint:  NAVARRO     HPI:   Miquel Salcedo is a 34 y.o. transgender female with a history of CKD3, T2DM, bicuspid AV, astrocytoma s/p  shunt, hyperparathyroidism, and hypothyroidism who presented with NAVARRO and fatigue. Cardiology consulted for NAVARRO and AI.     Patient recently admitted ot OSH from 05/01-05 after presenting with a dry cough and NAVARRO. While admitted she was treated for HTN urgency, Type II NSTEMI, LUTHER on CKD, and PNA vs pulmonary edema/volume overload. TTE EF 45-50% and severe AI. CHRIS then obtained and noted bicuspid AV with mild AS and severe AI, along with moderate aortic root dilation of 4.2cm. CTS consulted and planned for outpatient workup for surgical AV repair. She was diuresed with IV lasix and discharged on prior home dose of PRN Torsemide.     On discharge, she reports that she had improved symptomatically, though still with mild NAVARRO. This progressed over the next day with dyspnea with minimal activity. She reports some abdominal distension as well. Denies dyspnea at rest, LE edema, orthopnea, chest pain, pre-syncope, syncope.     Patient reports that she first noted LE edema and abdominal distension earlier this year. This was followed by dyspnea with exertion. She is prescribed PRN Torsemide outpatient and takes it 1-2 times weekly with improvement in  symptoms.    Past Medical History:   Diagnosis Date    Astrocytoma brain tumor     Diabetes mellitus     Hypothyroid     Macular degeneration     OU       Past Surgical History:   Procedure Laterality Date    CSF SHUNT      SOFT TISSUE TUMOR RESECTION      patient was about 5 yr of age       Review of patient's allergies indicates:  No Known Allergies    Current Facility-Administered Medications on File Prior to Encounter   Medication    [DISCONTINUED] GENERIC EXTERNAL MEDICATION     Current Outpatient Medications on File Prior to Encounter   Medication Sig    aspirin (ECOTRIN) 81 MG EC tablet Take 81 mg by mouth once daily.    carvediloL (COREG) 12.5 MG tablet Take 12.5 mg by mouth 2 (two) times daily.    blood sugar diagnostic Strp Checks bg 1-2  times a day. Dispense with lancets. One touch ultra  mini meter used.    calcitRIOL (ROCALTROL) 0.25 MCG Cap Take 1 capsule (0.25 mcg total) by mouth once daily.    estradioL (ESTRACE) 2 MG tablet Take 1.5 tablets (3 mg total) by mouth 2 (two) times daily.    finasteride (PROSCAR) 5 mg tablet Take 1 tablet (5 mg total) by mouth once daily.    levothyroxine (SYNTHROID) 50 MCG tablet TAKE 1 TABLET(50 MCG) BY MOUTH BEFORE BREAKFAST    losartan (COZAAR) 25 MG tablet Take 1 tablet (25 mg total) by mouth once daily.    minoxidiL (LONITEN) 2.5 MG tablet Take 1 tablet (2.5 mg total) by mouth 2 (two) times daily.    sodium bicarbonate 650 MG tablet Take 2 tablets (1,300 mg total) by mouth 2 (two) times daily.    torsemide (DEMADEX) 10 MG Tab Take 1 tablet (10 mg total) by mouth once daily.     Family History       Problem Relation (Age of Onset)    Heart disease Father    Stroke Father          Tobacco Use    Smoking status: Former    Smokeless tobacco: Former   Substance and Sexual Activity    Alcohol use: Yes     Comment: rarely    Drug use: No    Sexual activity: Not Currently     Partners: Female, Male     Review of Systems   Constitutional: Positive for malaise/fatigue.  Negative for chills and fever.   Cardiovascular:  Positive for dyspnea on exertion. Negative for chest pain, irregular heartbeat, leg swelling, near-syncope, orthopnea, palpitations and syncope.   Respiratory:  Negative for shortness of breath.    Gastrointestinal:  Positive for bloating. Negative for abdominal pain, nausea and vomiting.   Neurological:  Positive for weakness. Negative for light-headedness.     Objective:     Vital Signs (Most Recent):  Temp: 97.3 °F (36.3 °C) (05/06/25 2324)  Pulse: 99 (05/07/25 0000)  Resp: 18 (05/06/25 2324)  BP: (!) 118/57 (05/06/25 2324)  SpO2: 98 % (05/06/25 2324) Vital Signs (24h Range):  Temp:  [97.3 °F (36.3 °C)-99.2 °F (37.3 °C)] 97.3 °F (36.3 °C)  Pulse:  [] 99  Resp:  [16-20] 18  SpO2:  [98 %-99 %] 98 %  BP: (118-161)/(57-82) 118/57     Weight: 54.8 kg (120 lb 13 oz)  Body mass index is 24.4 kg/m².    SpO2: 98 %         Intake/Output Summary (Last 24 hours) at 5/7/2025 0225  Last data filed at 5/6/2025 2320  Gross per 24 hour   Intake 240 ml   Output --   Net 240 ml       Lines/Drains/Airways       Peripheral Intravenous Line  Duration                  Peripheral IV - Single Lumen 05/06/25 1653 20 G Left Forearm <1 day                     Physical Exam  Constitutional:       Appearance: Normal appearance.   HENT:      Mouth/Throat:      Mouth: Mucous membranes are moist.   Eyes:      Extraocular Movements: Extraocular movements intact.      Conjunctiva/sclera: Conjunctivae normal.   Cardiovascular:      Rate and Rhythm: Normal rate and regular rhythm.      Comments: JVD above edge of clavicle  Pulmonary:      Effort: Pulmonary effort is normal.   Abdominal:      General: Abdomen is flat.      Palpations: Abdomen is soft.   Musculoskeletal:      Right lower leg: No edema.      Left lower leg: No edema.   Skin:     General: Skin is warm and dry.   Neurological:      General: No focal deficit present.      Mental Status: She is alert and oriented to person, place, and  "time.          Significant Labs: CMP   Recent Labs   Lab 05/05/25  0500 05/06/25  1652   * 131*   K 3.2* 4.2   CL  --  97   CO2 23 23   GLU  --  112*   BUN 42* 38*   CREATININE 2.56* 2.5*   CALCIUM 7.8* 8.7   PROT 7 7.2   ALBUMIN 3.3* 3.2*   BILITOT 0.3* 0.2   ALKPHOS 59 70   AST 16 31   ALT 21 40   ANIONGAP 12 11   , CBC   Recent Labs   Lab 05/06/25  1652   WBC 11.24   HGB 9.8*   HCT 29.4*      , and INR No results for input(s): "INR", "PROTIME" in the last 48 hours.    Significant Imaging: X-Ray: CXR: X-Ray Chest 1 View (CXR):   Results for orders placed or performed during the hospital encounter of 05/06/25   X-Ray Chest 1 View    Narrative    EXAMINATION:  XR CHEST 1 VIEW    CLINICAL HISTORY:  shortness of breath;    TECHNIQUE:  Single frontal view of the chest was performed.    COMPARISON:  None    FINDINGS:  The cardiomediastinal silhouette is not enlarged.  There is no pleural effusion.  The trachea is midline.  The lungs are symmetrically expanded bilaterally without evidence of acute parenchymal process. No large focal consolidation seen.  There is no pneumothorax.  The osseous structures are unremarkable.  Shunt catheter tubing descends along the left aspect of the chest wall and left in, crosses midline, and descends along the right.  No kink or disruption.      Impression    As above      Electronically signed by: Shivam Cruz MD  Date:    05/06/2025  Time:    17:32     Assessment and Plan:     Severe aortic regurgitation  34 y.o. transgender female with history significant for bicuspid AV, CKD3, and newly diagnosed HFmrEF and severe AI. Cardiology consulted for NAVARRO and severe AI.    Patient recently admitted to OSH with new onset heart failure exacerbation in the s/o newly diagnosed severe AI with known bicuspid valve. Diuresed and discharged, though presenting to [facility] with ongoing NAVARRO. , significantly improved from prior hospitalization (1361). Trop 454 -> 369, due to demand. Cr " at baseline. JVD only mildly elevated on exam. Overall, patient near euvolemia.     Recommendations:  - Careful diuresis  - TTE  - Consult CTS, may be able to complete some of the surgical workup inpatient   - Strict I&Os, weights qd      Thank you for your consult. I will follow-up with patient. Please contact us if you have any additional questions.    Aleta Ferro MD  Cardiology   [facility] - [facility] Stepdown

## 2025-05-07 NOTE — ASSESSMENT & PLAN NOTE
Creatine stable for now. BMP reviewed- noted Estimated Creatinine Clearance: 30.7 mL/min (A) (based on SCr of 2.5 mg/dL (H)). according to latest data. Based on current GFR, CKD stage is stage 3 - GFR 30-59.  Monitor UOP and serial BMP and adjust therapy as needed. Renally dose meds. Avoid nephrotoxic medications and procedures.

## 2025-05-08 LAB
ABSOLUTE EOSINOPHIL (OHS): 0.51 K/UL
ABSOLUTE MONOCYTE (OHS): 1.13 K/UL (ref 0.3–1)
ABSOLUTE NEUTROPHIL COUNT (OHS): 6.86 K/UL (ref 1.8–7.7)
ALBUMIN SERPL BCP-MCNC: 2.8 G/DL (ref 3.5–5.2)
ALP SERPL-CCNC: 53 UNIT/L (ref 40–150)
ALT SERPL W/O P-5'-P-CCNC: 34 UNIT/L (ref 10–44)
ANION GAP (OHS): 10 MMOL/L (ref 8–16)
AST SERPL-CCNC: 17 UNIT/L (ref 11–45)
AV AREA BY CONTINUOUS VTI: 1.4 CM2
AV INDEX (PROSTH): 0.43
AV LVOT MEAN GRADIENT: 2 MMHG
AV LVOT PEAK GRADIENT: 4 MMHG
AV MEAN GRADIENT: 18 MMHG
AV PEAK GRADIENT: 29 MMHG
AV VALVE AREA BY VELOCITY RATIO: 1.2 CM²
AV VALVE AREA: 1.4 CM2
AV VELOCITY RATIO: 0.37
BASOPHILS # BLD AUTO: 0.16 K/UL
BASOPHILS NFR BLD AUTO: 1.5 %
BILIRUB SERPL-MCNC: 0.2 MG/DL (ref 0.1–1)
BSA FOR ECHO PROCEDURE: 1.51 M2
BUN SERPL-MCNC: 44 MG/DL (ref 6–20)
CALCIUM SERPL-MCNC: 8.6 MG/DL (ref 8.7–10.5)
CHLORIDE SERPL-SCNC: 99 MMOL/L (ref 95–110)
CO2 SERPL-SCNC: 24 MMOL/L (ref 23–29)
CREAT SERPL-MCNC: 2.6 MG/DL (ref 0.5–1.4)
CV ECHO LV RWT: 0.35 CM
DOP CALC AO PEAK VEL: 2.7 M/S
DOP CALC AO VTI: 44 CM
DOP CALC LVOT AREA: 3.1 CM2
DOP CALC LVOT DIAMETER: 2 CM
DOP CALC LVOT PEAK VEL: 1 M/S
DOP CALC LVOT STROKE VOLUME: 59.7 CM3
DOP CALCLVOT PEAK VEL VTI: 19 CM
ECHO EF ESTIMATED: 61 %
ECHO LV POSTERIOR WALL: 0.7 CM (ref 0.6–1.1)
ERYTHROCYTE [DISTWIDTH] IN BLOOD BY AUTOMATED COUNT: 14.1 % (ref 11.5–14.5)
FRACTIONAL SHORTENING: 32.5 % (ref 28–44)
GFR SERPLBLD CREATININE-BSD FMLA CKD-EPI: 32 ML/MIN/1.73/M2
GLUCOSE SERPL-MCNC: 121 MG/DL (ref 70–110)
HCT VFR BLD AUTO: 25.6 % (ref 40–54)
HGB BLD-MCNC: 8.3 GM/DL (ref 14–18)
IMM GRANULOCYTES # BLD AUTO: 0.15 K/UL (ref 0–0.04)
IMM GRANULOCYTES NFR BLD AUTO: 1.4 % (ref 0–0.5)
INTERVENTRICULAR SEPTUM: 0.8 CM (ref 0.6–1.1)
LEFT INTERNAL DIMENSION IN SYSTOLE: 2.7 CM (ref 2.1–4)
LEFT VENTRICLE DIASTOLIC VOLUME INDEX: 46.31 ML/M2
LEFT VENTRICLE DIASTOLIC VOLUME: 69 ML
LEFT VENTRICLE MASS INDEX: 57.6 G/M2
LEFT VENTRICLE SYSTOLIC VOLUME INDEX: 18.1 ML/M2
LEFT VENTRICLE SYSTOLIC VOLUME: 27 ML
LEFT VENTRICULAR INTERNAL DIMENSION IN DIASTOLE: 4 CM (ref 3.5–6)
LEFT VENTRICULAR MASS: 85.8 G
LYMPHOCYTES # BLD AUTO: 1.91 K/UL (ref 1–4.8)
MAGNESIUM SERPL-MCNC: 2 MG/DL (ref 1.6–2.6)
MCH RBC QN AUTO: 27.8 PG (ref 27–31)
MCHC RBC AUTO-ENTMCNC: 32.4 G/DL (ref 32–36)
MCV RBC AUTO: 86 FL (ref 82–98)
NUCLEATED RBC (/100WBC) (OHS): 0 /100 WBC
PLATELET # BLD AUTO: 358 K/UL (ref 150–450)
PMV BLD AUTO: 9.6 FL (ref 9.2–12.9)
POTASSIUM SERPL-SCNC: 4.7 MMOL/L (ref 3.5–5.1)
PROT SERPL-MCNC: 6.2 GM/DL (ref 6–8.4)
RBC # BLD AUTO: 2.99 M/UL (ref 4.6–6.2)
RELATIVE EOSINOPHIL (OHS): 4.8 %
RELATIVE LYMPHOCYTE (OHS): 17.8 % (ref 18–48)
RELATIVE MONOCYTE (OHS): 10.5 % (ref 4–15)
RELATIVE NEUTROPHIL (OHS): 64 % (ref 38–73)
SODIUM SERPL-SCNC: 133 MMOL/L (ref 136–145)
WBC # BLD AUTO: 10.72 K/UL (ref 3.9–12.7)
Z-SCORE OF LEFT VENTRICULAR DIMENSION IN END DIASTOLE: -1
Z-SCORE OF LEFT VENTRICULAR DIMENSION IN END SYSTOLE: -0.13

## 2025-05-08 PROCEDURE — 85025 COMPLETE CBC W/AUTO DIFF WBC: CPT

## 2025-05-08 PROCEDURE — 83735 ASSAY OF MAGNESIUM: CPT

## 2025-05-08 PROCEDURE — G0378 HOSPITAL OBSERVATION PER HR: HCPCS

## 2025-05-08 PROCEDURE — 80053 COMPREHEN METABOLIC PANEL: CPT

## 2025-05-08 PROCEDURE — 25000003 PHARM REV CODE 250

## 2025-05-08 PROCEDURE — 36415 COLL VENOUS BLD VENIPUNCTURE: CPT

## 2025-05-08 PROCEDURE — 25000003 PHARM REV CODE 250: Performed by: NURSE PRACTITIONER

## 2025-05-08 RX ORDER — ACETAMINOPHEN 325 MG/1
650 TABLET ORAL EVERY 6 HOURS PRN
Status: DISCONTINUED | OUTPATIENT
Start: 2025-05-08 | End: 2025-05-09 | Stop reason: HOSPADM

## 2025-05-08 RX ORDER — ONDANSETRON HYDROCHLORIDE 2 MG/ML
4 INJECTION, SOLUTION INTRAVENOUS EVERY 8 HOURS PRN
Status: DISCONTINUED | OUTPATIENT
Start: 2025-05-08 | End: 2025-05-09 | Stop reason: HOSPADM

## 2025-05-08 RX ORDER — BENZONATATE 100 MG/1
100 CAPSULE ORAL 3 TIMES DAILY PRN
Status: DISCONTINUED | OUTPATIENT
Start: 2025-05-08 | End: 2025-05-09 | Stop reason: HOSPADM

## 2025-05-08 RX ADMIN — CARVEDILOL 12.5 MG: 12.5 TABLET, FILM COATED ORAL at 08:05

## 2025-05-08 RX ADMIN — SODIUM BICARBONATE 650 MG TABLET 1300 MG: at 08:05

## 2025-05-08 RX ADMIN — LOSARTAN POTASSIUM 25 MG: 25 TABLET, FILM COATED ORAL at 08:05

## 2025-05-08 RX ADMIN — FINASTERIDE 5 MG: 5 TABLET, FILM COATED ORAL at 08:05

## 2025-05-08 RX ADMIN — MINOXIDIL 2.5 MG: 2.5 TABLET ORAL at 08:05

## 2025-05-08 RX ADMIN — CALCITRIOL CAPSULES 0.25 MCG 0.25 MCG: 0.25 CAPSULE ORAL at 08:05

## 2025-05-08 RX ADMIN — ESTRADIOL 3 MG: 2 TABLET ORAL at 08:05

## 2025-05-08 RX ADMIN — BENZONATATE 100 MG: 100 CAPSULE ORAL at 08:05

## 2025-05-08 RX ADMIN — LEVOTHYROXINE SODIUM 50 MCG: 0.05 TABLET ORAL at 06:05

## 2025-05-08 NOTE — NURSING TRANSFER
Nursing Transfer Note      5/8/2025   8:31 AM    Reason patient is being transferred: CT of chest    Transfer From: [facility]    Transfer via wheelchair    Transfer with cardiac monitoring    Transported by Patient escort    Telemetry: Box Number [id number]  Order for Tele Monitor? Yes    Additional Lines: n/a    Medicines sent: n/a    Patient belongings transferred with patient: No    Chart send with patient:

## 2025-05-08 NOTE — ASSESSMENT & PLAN NOTE
Anemia is likely due to chronic disease due to Chronic Kidney Disease. Most recent hemoglobin and hematocrit are listed below.  Recent Labs     05/06/25  1652 05/07/25  0539 05/08/25  0624   HGB 9.8* 9.0* 8.3*   HCT 29.4* 27.4* 25.6*     Plan  - Monitor serial CBC: Daily  - Transfuse PRBC if patient becomes hemodynamically unstable, symptomatic or H/H drops below 7/21.  - Patient has not received any PRBC transfusions to date  - Patient's anemia is currently stable

## 2025-05-08 NOTE — ASSESSMENT & PLAN NOTE
Echocardiogram with evidence of aortic regurgitation that is severe . The patient's most recent echocardiogram result is listed below. We will manage the valvular abnormality by consulting cardiology.     Transesophageal echo (CHRIS)  Result Date: 5/5/2025  Huber Barnes MD     5/5/2025 10:14 AM  CHRIS (transesophageal echocardiogram) with monitored conscious   sedation    Indication -rule out bicuspid aortic valve, possible severe   aortic regurgitation    Impression  1.  successful CHRIS performed without complication. Findings of   CHRIS show bicuspid aortic valve, mild aortic valve stenosis,   severe aortic valve regurgitation with 2 jets one of them   eccentric, moderately dilated ascending aortic root measured at   4.2 cm to be correlated with CTA, full results of study will be   reported separately.    -Cardiology consulted, did not think surgical intervention was needed.   -CTS consulted; recommended CT non-contrast and TTE with contrast.

## 2025-05-08 NOTE — PROGRESS NOTES
[facility] Turk - Cardiology [facility] Medicine  Progress Note    Patient Name: Miquel Salcedo  MRN: [id number]  Patient Class: OP- Observation   Admission Date: 5/6/2025  Length of Stay: 0 days  Attending Physician: Suzette Jefferson MD  Primary Care Provider: Janine Ventura FNP        Subjective     Principal Problem:NAVARRO (dyspnea on exertion)    HPI:  Ms. Fuad Salcedo is a 35 y/o transgender female with hx of NIDDM2, HLD, hypothyroidism, CKD3b, astrocytoma s/p  shunt who presented to the ED for evaluation of SOB and NAVARRO. Of note pt recently admitted for SOB and a productive cough, found to have a pneumonia, hypertensive urgency and volume overload with associated type 2 NSTEMI, as well as an LUTHER. Pt placed on course of empiric abx, diuresed with lasix and started on coreg. TTE obtained showing newly reduced EF 45-50% and severe AR. CHRIS during that admit performed 5/5 demonstrated bicuspid aortic valve with severe regurg and a moderately dilated ascending aortic root. CTS consulted, recommended surgical workup for valve repair. Pt discharged to home yesterday. Since DC, patient reports improvement in her cough, though continued profound NAVARRO to the point she can't even take a shower without getting significantly winded. Pt reports stable breathing at rest in bed, denies any current orthopnea, chest pain, palpitations.     In the ED, patient afebrile, SBP 160s, satting well on RA. ECG showing sinus tach  with LVH. CBC showing stable anemia of 9.8, no leukocytosis. Creatinine appears close to patient's current BL at 2.5. . HS-trop 454>369. CXR without acute findings. Cardiology consulted and patient admitted to  for continued management.     Overview/Hospital Course:  34 transgender female with hx of NIDDM2, HLD, hypothyroidism, CKD3b, bicuspid aortic valve with severe regurgitation and moderately dilated ascending aortic root astrocytoma s/p  shunt admitted to Hospital Medicine Team C  on 5/06 with SOB and NAVARRO. CTS consulted, recommended additional imaging with TTE with contrast and CT non-contrast as prior echo findings have been conflicting.     Interval History:   No new complaints this AM. Awaiting additional imaging.     Review of Systems   Respiratory:  Positive for cough and shortness of breath.    Cardiovascular:  Negative for chest pain, palpitations and leg swelling.     Objective:     Vital Signs (Most Recent):  Temp: 97.9 °F (36.6 °C) (05/08/25 0750)  Pulse: 94 (05/08/25 0828)  Resp: 16 (05/08/25 0750)  BP: 136/61 (05/08/25 0750)  SpO2: 99 % (05/08/25 0750) Vital Signs (24h Range):  Temp:  [97.8 °F (36.6 °C)-98.6 °F (37 °C)] 97.9 °F (36.6 °C)  Pulse:  [] 94  Resp:  [16-20] 16  SpO2:  [96 %-99 %] 99 %  BP: (103-148)/(53-66) 136/61     Weight: 54.8 kg (120 lb 13 oz)  Body mass index is 24.4 kg/m².    Intake/Output Summary (Last 24 hours) at 5/8/2025 1005  Last data filed at 5/8/2025 0828  Gross per 24 hour   Intake 360 ml   Output 1550 ml   Net -1190 ml         Physical Exam  Vitals reviewed.   Constitutional:       General: She is not in acute distress.     Appearance: She is well-developed. She is not diaphoretic.   HENT:      Head: Normocephalic.   Neck:      Vascular: No JVD.   Cardiovascular:      Rate and Rhythm: Normal rate and regular rhythm.   Pulmonary:      Effort: Pulmonary effort is normal. No respiratory distress.   Musculoskeletal:         General: Normal range of motion.      Cervical back: Normal range of motion.   Neurological:      Mental Status: She is alert. Mental status is at baseline.   Psychiatric:         Speech: Speech normal.             Significant Labs: All pertinent labs within the past 24 hours have been reviewed.        Assessment & Plan  NAVARRO (dyspnea on exertion)  Patient presenting with recurrent significant NAVARRO and SOB impairing her ability to exert herself even with ADLs. Pt appears euvolemic at this time, no e/o decompensated HF. C/f severe AR  "as etiology of patient's sxs.Cardiology consulted, did not think surgical intervention was needed.     - CTS consulted; recommended CT non-contrast and TTE with contrast.      Transwoman (she/her)   -- pronouns: she/her/hers  -- continue home estradiol and minoxidil on admission    Type 2 diabetes mellitus with retinopathy, without long-term current use of insulin  Patient's FSGs are controlled on current medication regimen.  Last A1c reviewed-   Lab Results   Component Value Date    HGBA1C 5.9 05/02/2025     Most recent fingerstick glucose reviewed-   No results for input(s): "POCTGLUCOSE" in the last 24 hours.    Current correctional scale  Low  Maintain anti-hyperglycemic dose as follows-   Antihyperglycemics (From admission, onward)      None          -- Hold Oral hypoglycemics while patient is in the hospital.  -- LDSSI as above, can adjust as clinically appropriate      Mixed dyslipidemia  -- most recent LDL 81, not currently on any lipid lowering therapy    Subclinical hypothyroidism  -- continue home synthroid 50mcg      H/O astrocytoma  -- noted, s/p shunt placement     Secondary hyperparathyroidism of renal origin  -- continue home calcitriol on admission  -- calcium wnl on admission     CKD stage 3b, GFR 30-44 ml/min  Creatine stable for now. BMP reviewed- noted Estimated Creatinine Clearance: 29.6 mL/min (A) (based on SCr of 2.6 mg/dL (H)). according to latest data. Based on current GFR, CKD stage is stage 3 - GFR 30-59.  Monitor UOP and serial BMP and adjust therapy as needed. Renally dose meds. Avoid nephrotoxic medications and procedures.  Anemia in chronic kidney disease (CKD)  Anemia is likely due to chronic disease due to Chronic Kidney Disease. Most recent hemoglobin and hematocrit are listed below.  Recent Labs     05/06/25  1652 05/07/25  0539 05/08/25  0624   HGB 9.8* 9.0* 8.3*   HCT 29.4* 27.4* 25.6*     Plan  - Monitor serial CBC: Daily  - Transfuse PRBC if patient becomes hemodynamically " unstable, symptomatic or H/H drops below 7/21.  - Patient has not received any PRBC transfusions to date  - Patient's anemia is currently stable  Hypertension  Patient's blood pressure range in the last 24 hours was: BP  Min: 103/56  Max: 148/66.The patient's inpatient anti-hypertensive regimen is listed below:  Current Antihypertensives  losartan tablet 25 mg, Daily, Oral  minoxidiL tablet 2.5 mg, 2 times daily, Oral  , 2 times daily, Oral  carvediloL tablet 12.5 mg, 2 times daily, Oral    Plan  -- BP is controlled, no changes needed to their regimen  -- continue home BP regimen    Chronic systolic heart failure  Patient with systolic HF (LVEF 45-50% at [facility]) s/o severe AR. Pt with recent admission with decompensated HF. On current admit patient appears overall euvolemic/volume down.     Patient has Systolic (HFrEF) heart failure that is Chronic. On presentation their CHF was well compensated. Most recent BNP and echo results are listed below.  Recent Labs     05/06/25  1652   *     Latest ECHO  No results found for this or any previous visit.    Current Heart Failure Medications  losartan tablet 25 mg, Daily, Oral  , 2 times daily, Oral  carvediloL tablet 12.5 mg, 2 times daily, Oral    Plan  -- Monitor strict I&Os and daily weights.    -- Place on telemetry  -- Low sodium diet  -- Cardiology has been consulted  -- The patient's volume status is at their baseline    Severe aortic stenosis  Echocardiogram with evidence of aortic regurgitation that is severe . The patient's most recent echocardiogram result is listed below. We will manage the valvular abnormality by consulting cardiology.     Transesophageal echo (CHRIS)  Result Date: 5/5/2025  Huber Barnes MD     5/5/2025 10:14 AM  CHRIS (transesophageal echocardiogram) with monitored conscious   sedation    Indication -rule out bicuspid aortic valve, possible severe   aortic regurgitation    Impression  1.  successful CHRIS performed without  complication. Findings of   CHRIS show bicuspid aortic valve, mild aortic valve stenosis,   severe aortic valve regurgitation with 2 jets one of them   eccentric, moderately dilated ascending aortic root measured at   4.2 cm to be correlated with CTA, full results of study will be   reported separately.    -Cardiology consulted, did not think surgical intervention was needed.   -CTS consulted; recommended CT non-contrast and TTE with contrast.    Elevated troponin  Patient with elevated HS-trop 454>369. Likely type 2 NSTEMI 2/2 demand ischemia s/o severe AR. ECG without ischemic changes, patient without any chest pain or discomfort. No indication for ACS protocol at this time.       VTE Risk Mitigation (From admission, onward)           Ordered     IP VTE LOW RISK PATIENT  Once         05/06/25 2042     Place sequential compression device  Until discontinued         05/06/25 2042                    Discharge Planning   LORENZO: 5/8/2025     Code Status: Full Code   Medical Readiness for Discharge Date:   Discharge Plan A: Home with family                Suzette Jefferson MD  Department of Hospital Medicine   [facility] - Cardiology Stepdown

## 2025-05-08 NOTE — ASSESSMENT & PLAN NOTE
Patient with systolic HF (LVEF 45-50% at [facility]) s/o severe AR. Pt with recent admission with decompensated HF. On current admit patient appears overall euvolemic/volume down.     Patient has Systolic (HFrEF) heart failure that is Chronic. On presentation their CHF was well compensated. Most recent BNP and echo results are listed below.  Recent Labs     05/06/25  1652   *     Latest ECHO  No results found for this or any previous visit.    Current Heart Failure Medications  losartan tablet 25 mg, Daily, Oral  , 2 times daily, Oral  carvediloL tablet 12.5 mg, 2 times daily, Oral    Plan  -- Monitor strict I&Os and daily weights.    -- Place on telemetry  -- Low sodium diet  -- Cardiology has been consulted  -- The patient's volume status is at their baseline

## 2025-05-08 NOTE — SUBJECTIVE & OBJECTIVE
Interval History:   No new complaints this AM. Awaiting additional imaging.     Review of Systems   Respiratory:  Positive for cough and shortness of breath.    Cardiovascular:  Negative for chest pain, palpitations and leg swelling.     Objective:     Vital Signs (Most Recent):  Temp: 97.9 °F (36.6 °C) (05/08/25 0750)  Pulse: 94 (05/08/25 0828)  Resp: 16 (05/08/25 0750)  BP: 136/61 (05/08/25 0750)  SpO2: 99 % (05/08/25 0750) Vital Signs (24h Range):  Temp:  [97.8 °F (36.6 °C)-98.6 °F (37 °C)] 97.9 °F (36.6 °C)  Pulse:  [] 94  Resp:  [16-20] 16  SpO2:  [96 %-99 %] 99 %  BP: (103-148)/(53-66) 136/61     Weight: 54.8 kg (120 lb 13 oz)  Body mass index is 24.4 kg/m².    Intake/Output Summary (Last 24 hours) at 5/8/2025 1005  Last data filed at 5/8/2025 0828  Gross per 24 hour   Intake 360 ml   Output 1550 ml   Net -1190 ml         Physical Exam  Vitals reviewed.   Constitutional:       General: She is not in acute distress.     Appearance: She is well-developed. She is not diaphoretic.   HENT:      Head: Normocephalic.   Neck:      Vascular: No JVD.   Cardiovascular:      Rate and Rhythm: Normal rate and regular rhythm.   Pulmonary:      Effort: Pulmonary effort is normal. No respiratory distress.   Musculoskeletal:         General: Normal range of motion.      Cervical back: Normal range of motion.   Neurological:      Mental Status: She is alert. Mental status is at baseline.   Psychiatric:         Speech: Speech normal.             Significant Labs: All pertinent labs within the past 24 hours have been reviewed.

## 2025-05-08 NOTE — ASSESSMENT & PLAN NOTE
"Patient's FSGs are controlled on current medication regimen.  Last A1c reviewed-   Lab Results   Component Value Date    HGBA1C 5.9 05/02/2025     Most recent fingerstick glucose reviewed-   No results for input(s): "POCTGLUCOSE" in the last 24 hours.    Current correctional scale  Low  Maintain anti-hyperglycemic dose as follows-   Antihyperglycemics (From admission, onward)      None          -- Hold Oral hypoglycemics while patient is in the hospital.  -- LDSSI as above, can adjust as clinically appropriate      "

## 2025-05-08 NOTE — PLAN OF CARE
Urine output overnight 750cc. Pt maintained free from falls/trauma/injuries. Pt denied pain or discomfort. Plan of care reviewed. Pt verbalized understanding. All questions and concerns addressed. VSS with call light and belongings within reach.    Problem: Adult Inpatient Plan of Care  Goal: Plan of Care Review  Outcome: Progressing  Goal: Patient-Specific Goal (Individualized)  Outcome: Progressing     Problem: Diabetes Comorbidity  Goal: Blood Glucose Level Within Targeted Range  Outcome: Progressing     Problem: Fall Injury Risk  Goal: Absence of Fall and Fall-Related Injury  Outcome: Progressing

## 2025-05-08 NOTE — HOSPITAL COURSE
34 transgender female with hx of NIDDM2, HLD, hypothyroidism, CKD3b, bicuspid aortic valve with severe regurgitation and moderately dilated ascending aortic root astrocytoma s/p  shunt admitted to Hospital Medicine Team C on 5/06 with SOB and NAVARRO. CTS consulted, recommended additional imaging with TTE with contrast and CT non-contrast as prior echo findings have been conflicting. Imaging reviewed by CTS and discussed with patient; no acute intervention needed inpatient. Will follow-up with CTS as an outpatient (appointment already scheduled for 5/15). Will also arrange ENT follow-up for hoarseness. All questions and concerns have been answered. She is medically stable for discharge.       Physical Exam  Vitals reviewed.   Constitutional:       General: She is not in acute distress.     Appearance: She is well-developed. She is not diaphoretic.   HENT:      Head: Normocephalic.   Neck:      Vascular: No JVD.   Cardiovascular:      Rate and Rhythm: Normal rate and regular rhythm.   Pulmonary:      Effort: Pulmonary effort is normal. No respiratory distress.   Musculoskeletal:         General: Normal range of motion.      Cervical back: Normal range of motion.   Neurological:      Mental Status: She is alert. Mental status is at baseline.

## 2025-05-08 NOTE — ASSESSMENT & PLAN NOTE
Patient's blood pressure range in the last 24 hours was: BP  Min: 103/56  Max: 148/66.The patient's inpatient anti-hypertensive regimen is listed below:  Current Antihypertensives  losartan tablet 25 mg, Daily, Oral  minoxidiL tablet 2.5 mg, 2 times daily, Oral  , 2 times daily, Oral  carvediloL tablet 12.5 mg, 2 times daily, Oral    Plan  -- BP is controlled, no changes needed to their regimen  -- continue home BP regimen

## 2025-05-08 NOTE — ASSESSMENT & PLAN NOTE
Creatine stable for now. BMP reviewed- noted Estimated Creatinine Clearance: 29.6 mL/min (A) (based on SCr of 2.6 mg/dL (H)). according to latest data. Based on current GFR, CKD stage is stage 3 - GFR 30-59.  Monitor UOP and serial BMP and adjust therapy as needed. Renally dose meds. Avoid nephrotoxic medications and procedures.

## 2025-05-08 NOTE — PLAN OF CARE
Evaluated patient at bedside with cardiology team.     Repeat Echo in house with EF 55-60%, GIIDD, LV normal size, RV normal, AV with Mod stenosis, Mild AI. Aortic root normal, ascending Aorta 4.0 cm. PAP 37, IVC 3.     Patient HDS, comfortable on room air, does not appear volume overloaded on exam.       Rec   Continue current management  Stable for d/c from cardiology perspective  Will need IP Gen cards follow up  Repeat echo in 6-12months to assess Ascending Aortic dilation  Rest of care per primary team

## 2025-05-08 NOTE — ASSESSMENT & PLAN NOTE
Patient presenting with recurrent significant NAVARRO and SOB impairing her ability to exert herself even with ADLs. Pt appears euvolemic at this time, no e/o decompensated HF. C/f severe AR as etiology of patient's sxs.Cardiology consulted, did not think surgical intervention was needed.     - CTS consulted; recommended CT non-contrast and TTE with contrast.

## 2025-05-09 VITALS
SYSTOLIC BLOOD PRESSURE: 134 MMHG | HEIGHT: 60 IN | RESPIRATION RATE: 18 BRPM | HEART RATE: 91 BPM | OXYGEN SATURATION: 97 % | DIASTOLIC BLOOD PRESSURE: 65 MMHG | TEMPERATURE: 98 F | WEIGHT: 120.81 LBS | BODY MASS INDEX: 23.72 KG/M2

## 2025-05-09 LAB
ABSOLUTE EOSINOPHIL (OHS): 0.37 K/UL
ABSOLUTE MONOCYTE (OHS): 1.12 K/UL (ref 0.3–1)
ABSOLUTE NEUTROPHIL COUNT (OHS): 6.63 K/UL (ref 1.8–7.7)
ALBUMIN SERPL BCP-MCNC: 2.8 G/DL (ref 3.5–5.2)
ALP SERPL-CCNC: 53 UNIT/L (ref 40–150)
ALT SERPL W/O P-5'-P-CCNC: 27 UNIT/L (ref 10–44)
ANION GAP (OHS): 7 MMOL/L (ref 8–16)
AST SERPL-CCNC: 12 UNIT/L (ref 11–45)
BASOPHILS # BLD AUTO: 0.08 K/UL
BASOPHILS NFR BLD AUTO: 0.8 %
BILIRUB SERPL-MCNC: 0.1 MG/DL (ref 0.1–1)
BUN SERPL-MCNC: 47 MG/DL (ref 6–20)
CALCIUM SERPL-MCNC: 8.8 MG/DL (ref 8.7–10.5)
CHLORIDE SERPL-SCNC: 103 MMOL/L (ref 95–110)
CO2 SERPL-SCNC: 25 MMOL/L (ref 23–29)
CREAT SERPL-MCNC: 2.6 MG/DL (ref 0.5–1.4)
ERYTHROCYTE [DISTWIDTH] IN BLOOD BY AUTOMATED COUNT: 14 % (ref 11.5–14.5)
GFR SERPLBLD CREATININE-BSD FMLA CKD-EPI: 32 ML/MIN/1.73/M2
GLUCOSE SERPL-MCNC: 121 MG/DL (ref 70–110)
HCT VFR BLD AUTO: 24.6 % (ref 40–54)
HGB BLD-MCNC: 7.8 GM/DL (ref 14–18)
IMM GRANULOCYTES # BLD AUTO: 0.11 K/UL (ref 0–0.04)
IMM GRANULOCYTES NFR BLD AUTO: 1.1 % (ref 0–0.5)
LYMPHOCYTES # BLD AUTO: 1.88 K/UL (ref 1–4.8)
MAGNESIUM SERPL-MCNC: 1.9 MG/DL (ref 1.6–2.6)
MCH RBC QN AUTO: 27.6 PG (ref 27–31)
MCHC RBC AUTO-ENTMCNC: 31.7 G/DL (ref 32–36)
MCV RBC AUTO: 87 FL (ref 82–98)
NUCLEATED RBC (/100WBC) (OHS): 0 /100 WBC
PLATELET # BLD AUTO: 337 K/UL (ref 150–450)
PMV BLD AUTO: 9.7 FL (ref 9.2–12.9)
POTASSIUM SERPL-SCNC: 5 MMOL/L (ref 3.5–5.1)
PROT SERPL-MCNC: 6 GM/DL (ref 6–8.4)
RBC # BLD AUTO: 2.83 M/UL (ref 4.6–6.2)
RELATIVE EOSINOPHIL (OHS): 3.6 %
RELATIVE LYMPHOCYTE (OHS): 18.4 % (ref 18–48)
RELATIVE MONOCYTE (OHS): 11 % (ref 4–15)
RELATIVE NEUTROPHIL (OHS): 65.1 % (ref 38–73)
SODIUM SERPL-SCNC: 135 MMOL/L (ref 136–145)
WBC # BLD AUTO: 10.19 K/UL (ref 3.9–12.7)

## 2025-05-09 PROCEDURE — 80053 COMPREHEN METABOLIC PANEL: CPT

## 2025-05-09 PROCEDURE — 94761 N-INVAS EAR/PLS OXIMETRY MLT: CPT

## 2025-05-09 PROCEDURE — 85025 COMPLETE CBC W/AUTO DIFF WBC: CPT

## 2025-05-09 PROCEDURE — G0378 HOSPITAL OBSERVATION PER HR: HCPCS

## 2025-05-09 PROCEDURE — 36415 COLL VENOUS BLD VENIPUNCTURE: CPT

## 2025-05-09 PROCEDURE — 25000003 PHARM REV CODE 250

## 2025-05-09 PROCEDURE — 83735 ASSAY OF MAGNESIUM: CPT

## 2025-05-09 RX ADMIN — LEVOTHYROXINE SODIUM 50 MCG: 0.05 TABLET ORAL at 05:05

## 2025-05-09 RX ADMIN — SODIUM BICARBONATE 650 MG TABLET 1300 MG: at 09:05

## 2025-05-09 RX ADMIN — FINASTERIDE 5 MG: 5 TABLET, FILM COATED ORAL at 09:05

## 2025-05-09 RX ADMIN — CALCITRIOL CAPSULES 0.25 MCG 0.25 MCG: 0.25 CAPSULE ORAL at 09:05

## 2025-05-09 RX ADMIN — CARVEDILOL 12.5 MG: 12.5 TABLET, FILM COATED ORAL at 09:05

## 2025-05-09 RX ADMIN — MINOXIDIL 2.5 MG: 2.5 TABLET ORAL at 10:05

## 2025-05-09 RX ADMIN — LOSARTAN POTASSIUM 25 MG: 25 TABLET, FILM COATED ORAL at 09:05

## 2025-05-09 RX ADMIN — ESTRADIOL 3 MG: 2 TABLET ORAL at 09:05

## 2025-05-09 NOTE — ASSESSMENT & PLAN NOTE
Patient's blood pressure range in the last 24 hours was: BP  Min: 107/52  Max: 165/71.The patient's inpatient anti-hypertensive regimen is listed below:  Current Antihypertensives  losartan tablet 25 mg, Daily, Oral  minoxidiL tablet 2.5 mg, 2 times daily, Oral  , 2 times daily, Oral  carvediloL tablet 12.5 mg, 2 times daily, Oral    Plan  -- BP is controlled, no changes needed to their regimen  -- continue home BP regimen

## 2025-05-09 NOTE — DISCHARGE SUMMARY
Yohan Turk - Cardiology [facility] Medicine  Discharge Summary      Patient Name: Miquel Salcedo  MRN: [id number]  JIM: [id number]  Patient Class: OP- Observation  Admission Date: 5/6/2025  Hospital Length of Stay: 0 days  Discharge Date and Time: 05/09/2025 11:03 AM  Attending Physician: Suzette Jefferson MD   Discharging Provider: Suzette Miller MD  Primary Care Provider: Janine Ventura [facility] Medicine Team: [facility] MED  Suzette Miller MD  Primary Care Team: [facility]    HPI:   Ms. Fuad Salcedo is a 33 y/o transgender female with hx of NIDDM2, HLD, hypothyroidism, CKD3b, astrocytoma s/p  shunt who presented to the ED for evaluation of SOB and NAVARRO. Of note pt recently admitted for SOB and a productive cough, found to have a pneumonia, hypertensive urgency and volume overload with associated type 2 NSTEMI, as well as an LUTHER. Pt placed on course of empiric abx, diuresed with lasix and started on coreg. TTE obtained showing newly reduced EF 45-50% and severe AR. CHRIS during that admit performed 5/5 demonstrated bicuspid aortic valve with severe regurg and a moderately dilated ascending aortic root. CTS consulted, recommended surgical workup for valve repair. Pt discharged to home yesterday. Since DC, patient reports improvement in her cough, though continued profound NAVARRO to the point she can't even take a shower without getting significantly winded. Pt reports stable breathing at rest in bed, denies any current orthopnea, chest pain, palpitations.     In the ED, patient afebrile, SBP 160s, satting well on RA. ECG showing sinus tach  with LVH. CBC showing stable anemia of 9.8, no leukocytosis. Creatinine appears close to patient's current BL at 2.5. . HS-trop 454>369. CXR without acute findings. Cardiology consulted and patient admitted to  for continued management.     * No surgery found *      Hospital Course:   34 transgender female with hx of NIDDM2, HLD,  hypothyroidism, CKD3b, bicuspid aortic valve with severe regurgitation and moderately dilated ascending aortic root astrocytoma s/p  shunt admitted to Hospital Medicine Team C on 5/06 with SOB and NAVARRO. CTS consulted, recommended additional imaging with TTE with contrast and CT non-contrast as prior echo findings have been conflicting. Imaging reviewed by CTS and discussed with patient; no acute intervention needed inpatient. Will plan for 6 month follow up with Dr. Marcum for TAA and AR with repeat CT scan and TTE at that time. Will also arrange ENT follow-up for hoarseness. All questions and concerns have been answered. She is medically stable for discharge.     Physical Exam  Vitals reviewed.   Constitutional:       General: She is not in acute distress.     Appearance: She is well-developed. She is not diaphoretic.   HENT:      Head: Normocephalic.   Neck:      Vascular: No JVD.   Cardiovascular:      Rate and Rhythm: Normal rate and regular rhythm.   Pulmonary:      Effort: Pulmonary effort is normal. No respiratory distress.   Musculoskeletal:         General: Normal range of motion.      Cervical back: Normal range of motion.   Neurological:      Mental Status: She is alert. Mental status is at baseline.        Goals of Care Treatment Preferences:  Code Status: Full Code      SDOH Screening:  The patient was screened for utility difficulties, food insecurity, transport difficulties, housing insecurity, and interpersonal safety and there were no concerns identified this admission.     Consults:   Consults (From admission, onward)          Status Ordering Provider     Inpatient consult to Cardiothoracic Surgery  Once        Provider:  (Not yet assigned)    Completed JERONIMO JARRETT     Inpatient consult to Cardiology  Once        Provider:  (Not yet assigned)    Completed SAMAD, MAWADAH            Assessment & Plan  NAVARRO (dyspnea on exertion)  Patient presenting with recurrent significant NAVARRO and SOB impairing  "her ability to exert herself even with ADLs. Pt appears euvolemic at this time, no e/o decompensated HF. C/f severe AR as etiology of patient's sxs.Cardiology consulted, did not think surgical intervention was needed.     - CTS consulted; recommended CT non-contrast and TTE with contrast. Imaging reviewed. Plan for follow-up as an outpatient.    Transwoman (she/her)   -- pronouns: she/her/hers  -- continue home estradiol and minoxidil on admission    Type 2 diabetes mellitus with retinopathy, without long-term current use of insulin  Patient's FSGs are controlled on current medication regimen.  Last A1c reviewed-   Lab Results   Component Value Date    HGBA1C 5.9 05/02/2025     Most recent fingerstick glucose reviewed-   No results for input(s): "POCTGLUCOSE" in the last 24 hours.    Current correctional scale  Low  Maintain anti-hyperglycemic dose as follows-   Antihyperglycemics (From admission, onward)      None          -- Hold Oral hypoglycemics while patient is in the hospital.  -- LDSSI as above, can adjust as clinically appropriate      Mixed dyslipidemia  -- most recent LDL 81, not currently on any lipid lowering therapy    Subclinical hypothyroidism  -- continue home synthroid 50mcg      H/O astrocytoma  -- noted, s/p shunt placement     Secondary hyperparathyroidism of renal origin  -- continue home calcitriol on admission  -- calcium wnl on admission     CKD stage 3b, GFR 30-44 ml/min  Creatine stable for now. BMP reviewed- noted Estimated Creatinine Clearance: 29.6 mL/min (A) (based on SCr of 2.6 mg/dL (H)). according to latest data. Based on current GFR, CKD stage is stage 3 - GFR 30-59.  Monitor UOP and serial BMP and adjust therapy as needed. Renally dose meds. Avoid nephrotoxic medications and procedures.  Anemia in chronic kidney disease (CKD)  Anemia is likely due to chronic disease due to Chronic Kidney Disease. Most recent hemoglobin and hematocrit are listed below.  Recent Labs     " 05/07/25  0539 05/08/25  0624 05/09/25  0456   HGB 9.0* 8.3* 7.8*   HCT 27.4* 25.6* 24.6*     Plan  - Monitor serial CBC: Daily  - Transfuse PRBC if patient becomes hemodynamically unstable, symptomatic or H/H drops below 7/21.  - Patient has not received any PRBC transfusions to date  - Patient's anemia is currently stable  Hypertension  Patient's blood pressure range in the last 24 hours was: BP  Min: 107/52  Max: 165/71.The patient's inpatient anti-hypertensive regimen is listed below:  Current Antihypertensives  losartan tablet 25 mg, Daily, Oral  minoxidiL tablet 2.5 mg, 2 times daily, Oral  , 2 times daily, Oral  carvediloL tablet 12.5 mg, 2 times daily, Oral    Plan  -- BP is controlled, no changes needed to their regimen  -- continue home BP regimen    Chronic systolic heart failure  Patient with systolic HF (LVEF 45-50% at [facility]) s/o severe AR. Pt with recent admission with decompensated HF. On current admit patient appears overall euvolemic/volume down.     Patient has Systolic (HFrEF) heart failure that is Chronic. On presentation their CHF was well compensated. Most recent BNP and echo results are listed below.  Recent Labs     05/06/25  1652   *     Latest ECHO  No results found for this or any previous visit.    Current Heart Failure Medications  losartan tablet 25 mg, Daily, Oral  , 2 times daily, Oral  carvediloL tablet 12.5 mg, 2 times daily, Oral    Plan  -- Monitor strict I&Os and daily weights.    -- Place on telemetry  -- Low sodium diet  -- Cardiology has been consulted  -- The patient's volume status is at their baseline    Severe aortic stenosis  Echocardiogram with evidence of aortic regurgitation that is severe . The patient's most recent echocardiogram result is listed below. We will manage the valvular abnormality by consulting cardiology.     Transesophageal echo (CHRIS)  Result Date: 5/5/2025  Huber Barnes MD     5/5/2025 10:14 AM  CHRIS (transesophageal echocardiogram)  with monitored conscious   sedation    Indication -rule out bicuspid aortic valve, possible severe   aortic regurgitation    Impression  1.  successful CHRIS performed without complication. Findings of   CHRIS show bicuspid aortic valve, mild aortic valve stenosis,   severe aortic valve regurgitation with 2 jets one of them   eccentric, moderately dilated ascending aortic root measured at   4.2 cm to be correlated with CTA, full results of study will be   reported separately.    -Cardiology consulted, did not think surgical intervention was needed.   -CTS consulted; recommended CT non-contrast and TTE with contrast.    Elevated troponin  Patient with elevated HS-trop 454>369. Likely type 2 NSTEMI 2/2 demand ischemia s/o severe AR. ECG without ischemic changes, patient without any chest pain or discomfort. No indication for ACS protocol at this time.       Severe aortic regurgitation    Echo Ultrasound enhancing contrast? Yes  Result Date: 2025  Repeat study to reassess the aortic regurgitation with and without bicycle   exercise.    On resting images, again is demonstrated a normal LV cavity size, normal   LVOT stroke volume, and color and spectral Doppler signals consistent with   no more than mild aortic insufficiency.    With bicycles pedaling, and with the heart rate rising to near 120 bpm,   the aortic insufficiency decreases in volume and is barely noticeable.    Conclusion: Mild aortic insufficiency         Transesophageal echo (CHRIS)  Result Date: 2025                              Transesophageal Echocardiogram  Name: ARIELLE BRUNO             Study Date: 2025  MRN: [id number]                         Age: 34 yrs  Patient Location: [facility]^[id number]  : 1990  Gender: Male  Reason For Study: Evaluate    Ordering Physician: DOMINIC PRIETO  Performed By: Stan Virgen RDCS    Interpretation Summary  Ejection Fraction = 60-65%.  The aortic valve is bicuspid.  There is an eccentric jet of aortic  insufficiency directed against the  anterior mitral leaflet.  Severe aortic regurgitation.  Moderate aortic root dilatation.    Measurements with Normals    MMode/2D Measurements & Calculations  LVOT diam: 2.0 cm  LVOT area: 3.1 cm2    Doppler Measurements & Calculations  Ao V2 max: 238.5 cm/sec               AI max aldo: 252.2 cm/sec  Ao max P.8 mmHg                  AI max P.4 mmHg  Ao V2 mean: 157.3 cm/sec  Ao mean P.5 mmHg                 AI dec slope: 782.7 cm/sec2  Ao V2 VTI: 41.1 cm                    AI P1/2t: 94.4 msec    HARRIS(I,D): 1.6 cm2  HARRIS(V,D): 1.5 cm2  LV V1 max P.8 mmHg                SV(LVOT): 65.9 ml  LV V1 mean PG: 3.5 mmHg  LV V1 max: 120.1 cm/sec  LV V1 mean: 88.3 cm/sec  LV V1 VTI: 21.4 cm  AV P1/2t-pr_phl: 94.4 msec  AV VR_phl: 0.50    LEFT VENTRICLE       o The left ventricle is grossly normal size.     o Ejection Fraction = 60-65%.    RIGHT VENTRICLE       o The right ventricle is grossly normal size.     o RV systolic function grossly normal.    ATRIA       o Left Atrium Grossly Normal.     o Right Atrium Grossly Normal.    MITRAL VALVE       o The mitral valve is grossly normal.     o There is trace mitral regurgitation.    TRICUSPID VALVE       o The tricuspid valve is not well visualized, but is grossly normal.     o There is trace tricuspid regurgitation.    AORTIC VALVE       o The aortic valve is bicuspid.     o Severe aortic regurgitation.     o There is an eccentric jet of aortic insufficiency directed against the       anterior mitral leaflet.    PULMONIC VALVE       o The pulmonic valve is not well visualized.    GREAT VESSELS       o Moderate aortic root dilatation.    ______________________________________________________________________________  Electronically signed by: RASHAAD Barnes 2025 01:50 PM  InterpretingPhysician: RASHAAD Barnes,  electronically signed on 2025 13:50:[id number]      Echo  Result Date: 2025    Left Ventricle:  The left ventricle is normal in size. Normal wall   thickness. Normal wall motion. There is normal systolic function with a   visually estimated ejection fraction of 55 - 60%. Ejection fraction is   approximately 60%. Grade II diastolic dysfunction.    Right Ventricle: The right ventricle is normal in size. Wall thickness   is normal. Systolic function is normal.    Aortic Valve: The aortic valve may be a  bicuspid valve but do not see   leaflets clearly. There is moderate aortic valve sclerosis. Mildly   restricted motion. There is moderate stenosis. Aortic valve area by VTI is   1.2 cm². Aortic valve peak velocity is 2.7 m/s. Mean gradient is 14 mmHg.   The dimensionless index is 0.43. There is mild aortic regurgitation.    Mitral Valve: There is trace regurgitation.    Tricuspid Valve: There is mild regurgitation.    Aorta: The aortic root is normal in size measuring 2.63 cm. The   ascending aorta is mildly to moderately dilated measuring 4.0 cm.    Pulmonary Artery: The estimated pulmonary artery systolic pressure is   37 mmHg.    IVC/SVC: Normal venous pressure at 3 mmHg.        Transesophageal echo (CHRIS)  Result Date: 5/5/2025  Huber Barnes MD     5/5/2025 10:14 AM  CHRIS (transesophageal echocardiogram) with monitored conscious   sedation    Indication -rule out bicuspid aortic valve, possible severe   aortic regurgitation    Physician - Huber Barnes MD, [facility]    Assistant  -Yolanda Lopez RN    Sedation monitor -Yolanda Lopez RN    Monitored conscious sedation time -30 minutes    Blood loss - none    Specimen removed - none    Procedure in detail  Patient was informed and consented a risks and benefit of   procedure prior to being brought to cath lab holding. Local   anesthesia with aerosolized lidocaine was applied to the throat.   The patient was given conscious sedation by a registered nurse   with me in attendance. The agents used were IV Versed and   fentanyl.  The patient was directly monitored  throughout the   procedure including vital signs, ECG, face-to-face assessment and   level of sedation assessment. Patient was directly monitored   throughout the procedure with vitals signs, face to face   assessments, and assement of level of sedation.  A Bite-block was   inserted. CHRIS performed in the usual fashion with findings   reported separately. Patient recovered without complication.    Impression  1.  successful CHRIS performed without complication. Findings of   CHRIS show bicuspid aortic valve, mild aortic valve stenosis,   severe aortic valve regurgitation with 2 jets one of them   eccentric, moderately dilated ascending aortic root measured at   4.2 cm to be correlated with CTA, full results of study will be   reported separately.    Consultation with cardiothoracic surgery      Huber Barnes MD, [facility] Cardiology  [id number]  [place] [id number]  Office - [phone]        Final Active Diagnoses:    Diagnosis Date Noted POA    PRINCIPAL PROBLEM:  NAVARRO (dyspnea on exertion) [R06.09] 05/06/2025 Yes    Severe aortic regurgitation [I35.1] 05/07/2025 Yes    Chronic systolic heart failure [I50.22] 05/06/2025 Yes    Severe aortic stenosis [I35.0] 05/06/2025 Yes    Elevated troponin [R79.89] 05/06/2025 Yes    Hypertension [I10] 11/12/2024 Yes    CKD stage 3b, GFR 30-44 ml/min [N18.32] 08/09/2024 Yes    Anemia in chronic kidney disease (CKD) [N18.9, D63.1] 08/09/2024 Yes    Secondary hyperparathyroidism of renal origin [N25.81] 10/21/2022 Yes    Transwoman (she/her)  [F64.0, Z79.899] 02/02/2021 Not Applicable    Type 2 diabetes mellitus with retinopathy, without long-term current use of insulin [E11.319] 02/02/2021 Yes    Mixed dyslipidemia [E78.2] 02/02/2021 Yes    Subclinical hypothyroidism [E03.8] 02/02/2021 Yes    H/O astrocytoma [Z85.841] 02/02/2021 Not Applicable      Problems Resolved During this Admission:       Discharged Condition: stable    Disposition: Home or Self  Care    Follow Up:    Patient Instructions:      Ambulatory referral/consult to ENT   Standing Status: Future   Referral Priority: Routine Referral Type: Consultation   Referral Reason: Specialty Services Required   Requested Specialty: Otolaryngology   Number of Visits Requested: 1     ACCEPT - Ambulatory referral/consult to [facility]   Standing Status: Future   Referral Priority: Routine Referral Type: Consultation   Referral Reason: Specialty Services Required   Requested Specialty: Cardiology   Number of Visits Requested: 1     Diet Cardiac     Notify your health care provider if you experience any of the following:  temperature >100.4     Notify your health care provider if you experience any of the following:  redness, tenderness, or signs of infection (pain, swelling, redness, odor or green/yellow discharge around incision site)     Notify your health care provider if you experience any of the following:  severe uncontrolled pain     Notify your health care provider if you experience any of the following:  persistent dizziness, light-headedness, or visual disturbances     Notify your health care provider if you experience any of the following:  increased confusion or weakness     Notify your health care provider if you experience any of the following:  difficulty breathing or increased cough     Activity as tolerated       Significant Diagnostic Studies: Labs: BMP:   Recent Labs   Lab 05/08/25 0624 05/09/25  0456   * 121*   * 135*   K 4.7 5.0   CL 99 103   CO2 24 25   BUN 44* 47*   CREATININE 2.6* 2.6*   CALCIUM 8.6* 8.8   MG 2.0 1.9   , CMP   Recent Labs   Lab 05/08/25 0624 05/09/25  0456   * 135*   K 4.7 5.0   CL 99 103   CO2 24 25   * 121*   BUN 44* 47*   CREATININE 2.6* 2.6*   CALCIUM 8.6* 8.8   PROT 6.2 6.0   ALBUMIN 2.8* 2.8*   BILITOT 0.2 0.1   ALKPHOS 53 53   AST 17 12   ALT 34 27   ANIONGAP 10 7*   , and CBC   Recent Labs   Lab 05/08/25 0624  05/09/25  0456   WBC 10.72 10.19   HGB 8.3* 7.8*   HCT 25.6* 24.6*    337       Pending Diagnostic Studies:       Procedure Component Value Units Date/Time    CT Chest Without Contrast [[id number]] Resulted: 05/08/25 0845    Order Status: Sent Lab Status: In process Updated: 05/08/25 0853    HCV Virus Hold Specimen [[id number]] Collected: 05/06/25 1652    Order Status: Sent Lab Status: In process Updated: 05/06/25 1658    Specimen: Blood            Medications:  Reconciled Home Medications:      Medication List        CONTINUE taking these medications      blood sugar diagnostic Strp  Checks bg 1-2  times a day. Dispense with lancets. One touch ultra  mini meter used.     calcitRIOL 0.25 MCG Cap  Commonly known as: ROCALTROL  Take 1 capsule (0.25 mcg total) by mouth once daily.     carvediloL 12.5 MG tablet  Commonly known as: COREG  Take 12.5 mg by mouth 2 (two) times daily.     estradioL 2 MG tablet  Commonly known as: ESTRACE  Take 1.5 tablets (3 mg total) by mouth 2 (two) times daily.     finasteride 5 mg tablet  Commonly known as: PROSCAR  Take 1 tablet (5 mg total) by mouth once daily.     levothyroxine 50 MCG tablet  Commonly known as: SYNTHROID  TAKE 1 TABLET(50 MCG) BY MOUTH BEFORE BREAKFAST     losartan 25 MG tablet  Commonly known as: COZAAR  Take 1 tablet (25 mg total) by mouth once daily.     minoxidiL 2.5 MG tablet  Commonly known as: LONITEN  Take 1 tablet (2.5 mg total) by mouth 2 (two) times daily.     sodium bicarbonate 650 MG tablet  Take 2 tablets (1,300 mg total) by mouth 2 (two) times daily.     torsemide 10 MG Tab  Commonly known as: DEMADEX  Take 1 tablet (10 mg total) by mouth once daily.            ASK your doctor about these medications      aspirin 81 MG EC tablet  Commonly known as: ECOTRIN  Take 81 mg by mouth once daily.              Indwelling Lines/Drains at time of discharge:   Lines/Drains/Airways       None                   Time spent on the discharge of patient: 35  minutes         Suzette Jefferson MD  Department of [facility] Medicine  [facility] - Cardiology Stepdown

## 2025-05-09 NOTE — NURSING
Patient is ready for discharge. Patient stable alert and oriented x4. VSS(see flowsheet)  IVs removed. No complaints of chest pain, sob, palpitations, dizziness, lightheadedness, headache, or n/v. Discussed discharge plan. Reviewed AVS;  medications and side effects, appointments, and answered questions with patient and family.No RX provided, no new medications added to patient's regimen. Patient confirmed having all medications listed at home.

## 2025-05-09 NOTE — ASSESSMENT & PLAN NOTE
Echo Ultrasound enhancing contrast? Yes  Result Date: 2025  Repeat study to reassess the aortic regurgitation with and without bicycle   exercise.    On resting images, again is demonstrated a normal LV cavity size, normal   LVOT stroke volume, and color and spectral Doppler signals consistent with   no more than mild aortic insufficiency.    With bicycles pedaling, and with the heart rate rising to near 120 bpm,   the aortic insufficiency decreases in volume and is barely noticeable.    Conclusion: Mild aortic insufficiency         Transesophageal echo (CHRIS)  Result Date: 2025                              Transesophageal Echocardiogram  Name: ARIELLE BRUNO             Study Date: 2025  MRN: [id number]                         Age: 34 yrs  Patient Location: [facility]^[id number]  : 1990  Gender: Male  Reason For Study: Evaluate    Ordering Physician: DOMINIC PRIETO  Performed By: Stan Virgen RDCS    Interpretation Summary  Ejection Fraction = 60-65%.  The aortic valve is bicuspid.  There is an eccentric jet of aortic insufficiency directed against the  anterior mitral leaflet.  Severe aortic regurgitation.  Moderate aortic root dilatation.    Measurements with Normals    MMode/2D Measurements & Calculations  LVOT diam: 2.0 cm  LVOT area: 3.1 cm2    Doppler Measurements & Calculations  Ao V2 max: 238.5 cm/sec               AI max aldo: 252.2 cm/sec  Ao max P.8 mmHg                  AI max P.4 mmHg  Ao V2 mean: 157.3 cm/sec  Ao mean P.5 mmHg                 AI dec slope: 782.7 cm/sec2  Ao V2 VTI: 41.1 cm                    AI P1/2t: 94.4 msec    HARRIS(I,D): 1.6 cm2  HARRIS(V,D): 1.5 cm2  LV V1 max P.8 mmHg                SV(LVOT): 65.9 ml  LV V1 mean PG: 3.5 mmHg  LV V1 max: 120.1 cm/sec  LV V1 mean: 88.3 cm/sec  LV V1 VTI: 21.4 cm  AV P1/2t-pr_phl: 94.4 msec  AV VR_phl: 0.50    LEFT VENTRICLE       o The left ventricle is grossly normal size.     o Ejection Fraction = 60-65%.    RIGHT  VENTRICLE       o The right ventricle is grossly normal size.     o RV systolic function grossly normal.    ATRIA       o Left Atrium Grossly Normal.     o Right Atrium Grossly Normal.    MITRAL VALVE       o The mitral valve is grossly normal.     o There is trace mitral regurgitation.    TRICUSPID VALVE       o The tricuspid valve is not well visualized, but is grossly normal.     o There is trace tricuspid regurgitation.    AORTIC VALVE       o The aortic valve is bicuspid.     o Severe aortic regurgitation.     o There is an eccentric jet of aortic insufficiency directed against the       anterior mitral leaflet.    PULMONIC VALVE       o The pulmonic valve is not well visualized.    GREAT VESSELS       o Moderate aortic root dilatation.    ______________________________________________________________________________  Electronically signed by: RASHAAD Barnes 05/07/2025 01:50 PM  InterpretingPhysician: RASHAAD Barnes,  electronically signed on 2025-05-07 13:50:45.763      Echo  Result Date: 5/7/2025    Left Ventricle: The left ventricle is normal in size. Normal wall   thickness. Normal wall motion. There is normal systolic function with a   visually estimated ejection fraction of 55 - 60%. Ejection fraction is   approximately 60%. Grade II diastolic dysfunction.    Right Ventricle: The right ventricle is normal in size. Wall thickness   is normal. Systolic function is normal.    Aortic Valve: The aortic valve may be a  bicuspid valve but do not see   leaflets clearly. There is moderate aortic valve sclerosis. Mildly   restricted motion. There is moderate stenosis. Aortic valve area by VTI is   1.2 cm². Aortic valve peak velocity is 2.7 m/s. Mean gradient is 14 mmHg.   The dimensionless index is 0.43. There is mild aortic regurgitation.    Mitral Valve: There is trace regurgitation.    Tricuspid Valve: There is mild regurgitation.    Aorta: The aortic root is normal in size measuring 2.63 cm. The    ascending aorta is mildly to moderately dilated measuring 4.0 cm.    Pulmonary Artery: The estimated pulmonary artery systolic pressure is   37 mmHg.    IVC/SVC: Normal venous pressure at 3 mmHg.        Transesophageal echo (CHRIS)  Result Date: 5/5/2025  Huber Barnes MD     5/5/2025 10:14 AM  CHRIS (transesophageal echocardiogram) with monitored conscious   sedation    Indication -rule out bicuspid aortic valve, possible severe   aortic regurgitation    Physician - Huber Barnes MD, [facility]    Assistant  -Yolanda Lopez RN    Sedation monitor -Yolanda Lopez RN    Monitored conscious sedation time -30 minutes    Blood loss - none    Specimen removed - none    Procedure in detail  Patient was informed and consented a risks and benefit of   procedure prior to being brought to cath lab holding. Local   anesthesia with aerosolized lidocaine was applied to the throat.   The patient was given conscious sedation by a registered nurse   with me in attendance. The agents used were IV Versed and   fentanyl.  The patient was directly monitored throughout the   procedure including vital signs, ECG, face-to-face assessment and   level of sedation assessment. Patient was directly monitored   throughout the procedure with vitals signs, face to face   assessments, and assement of level of sedation.  A Bite-block was   inserted. CHRIS performed in the usual fashion with findings   reported separately. Patient recovered without complication.    Impression  1.  successful CHRIS performed without complication. Findings of   CHRIS show bicuspid aortic valve, mild aortic valve stenosis,   severe aortic valve regurgitation with 2 jets one of them   eccentric, moderately dilated ascending aortic root measured at   4.2 cm to be correlated with CTA, full results of study will be   reported separately.    Consultation with cardiothoracic surgery      Huber Barnes MD, [facility]  [id number]  [place] [id number]  Office   [phone]

## 2025-05-09 NOTE — ASSESSMENT & PLAN NOTE
Patient presenting with recurrent significant NAVARRO and SOB impairing her ability to exert herself even with ADLs. Pt appears euvolemic at this time, no e/o decompensated HF. C/f severe AR as etiology of patient's sxs.Cardiology consulted, did not think surgical intervention was needed.     - CTS consulted; recommended CT non-contrast and TTE with contrast. Imaging reviewed. Plan for follow-up as an outpatient.

## 2025-05-09 NOTE — PLAN OF CARE
Pt maintained free from falls/trauma/injuries. Pt denied pain or discomfort. Plan of care reviewed. Pt verbalized understanding. All questions and concerns addressed. VSS with call light and belongings within reach.    Problem: Adult Inpatient Plan of Care  Goal: Plan of Care Review  Outcome: Progressing  Goal: Patient-Specific Goal (Individualized)  Outcome: Progressing     Problem: Diabetes Comorbidity  Goal: Blood Glucose Level Within Targeted Range  Outcome: Progressing     Problem: Fall Injury Risk  Goal: Absence of Fall and Fall-Related Injury  Outcome: Progressing

## 2025-05-09 NOTE — CARE UPDATE
Repeat exercise echo with only mild AR. Will plan for 6 month follow up with Dr. Marcum for TAA and AR with repeat CT scan and TTE at that time. Patient to reach out to us if symptoms change.  Recommend follow up with ENT for hoarseness.

## 2025-05-09 NOTE — PLAN OF CARE
[facility] - Cardiology Stepdown  Discharge Final Note    Primary Care Provider: Janine Ventura FNP    Expected Discharge Date: 5/9/2025    Final Discharge Note (most recent)       Final Note - 05/09/25 1250          Final Note    Assessment Type Final Discharge Note     Anticipated Discharge Disposition Home or Self Care                 Per Dr Jefferson the only follow-up appts needed are with ENT and CTS.  CTS to schedule their own appt.      Janine Bustamante LMSW  [facility]  [id number]      Future Appointments   Date Time Provider Department Center   5/14/2025  1:00 PM Deena Escalante NP [facility] ENT [place]   5/23/2025  9:00 AM SPECIMEN, [facility] LAB [place]   5/28/2025  2:20 PM Geovanna Valenzuela MD [facility] NEPHR [place] [facility]

## 2025-05-09 NOTE — PLAN OF CARE
AAOX4,VSS,O2 sats 99% on room air.Plan of care discussed with patient, patient being discharged.Cardiology recommends no intervention needed,  CTS to follow up with patient outpatient. Patient has no complaints of chest pain/SOB, palpitations,dizziness, lightheadedness, headache, or n/v.Voice change, hoarseness noted, but denies any throat pain or discomfort. Discussed medications and care. Patient has no questions or concerns at this time.Pt visualised and stable, with no acute distress noted.Call light within reach.Pt resting,bed at lowest position.Pt's brother by the bedside.Will continue to monitor through the shift, plan of care ongoing.       Problem: Adult Inpatient Plan of Care  Goal: Plan of Care Review  Outcome: Met  Goal: Patient-Specific Goal (Individualized)  Outcome: Met  Goal: Absence of Hospital-Acquired Illness or Injury  Outcome: Met  Goal: Optimal Comfort and Wellbeing  Outcome: Met  Goal: Readiness for Transition of Care  Outcome: Met     Problem: Diabetes Comorbidity  Goal: Blood Glucose Level Within Targeted Range  Outcome: Met     Problem: Fall Injury Risk  Goal: Absence of Fall and Fall-Related Injury  Outcome: Met

## 2025-05-09 NOTE — ASSESSMENT & PLAN NOTE
Patient with systolic HF (LVEF 45-50% at [facility]) s/o severe AR. Pt with recent admission with decompensated HF. On current admit patient appears overall euvolemic/volume down.     Patient has Systolic (HFrEF) heart failure that is Chronic. On presentation their CHF was well compensated. Most recent BNP and echo results are listed below.  Recent Labs     05/06/25  1652   *     Latest ECHO  No results found for this or any previous visit.    Current Heart Failure Medications  losartan tablet 25 mg, Daily, Oral  , 2 times daily, Oral  carvediloL tablet 12.5 mg, 2 times daily, Oral    Plan  -- Monitor strict I&Os and daily weights.    -- Place on telemetry  -- Low sodium diet  -- Cardiology has been consulted  -- The patient's volume status is at their baseline

## 2025-05-09 NOTE — NURSING
Patient's k+5.0. MAURA Jefferson MD notified, expressing concerns of k+ rising further and becoming abnormal due to patient take Losartan which in return elevates the k+levels. MD aware and ordered to administer Losartan as it is on the upper end of normal but not elevated enough to discontinue it and patient usually takes torsemide at home to balance it all out. Patient confirmed taking Torsemide as needed at home. MD informed. No additional orders given at this time, plan of care ongoing.

## 2025-05-09 NOTE — ASSESSMENT & PLAN NOTE
Anemia is likely due to chronic disease due to Chronic Kidney Disease. Most recent hemoglobin and hematocrit are listed below.  Recent Labs     05/07/25  0539 05/08/25  0624 05/09/25  0456   HGB 9.0* 8.3* 7.8*   HCT 27.4* 25.6* 24.6*     Plan  - Monitor serial CBC: Daily  - Transfuse PRBC if patient becomes hemodynamically unstable, symptomatic or H/H drops below 7/21.  - Patient has not received any PRBC transfusions to date  - Patient's anemia is currently stable

## 2025-05-12 ENCOUNTER — EPISODE CHANGES (OUTPATIENT)
Dept: CARDIOLOGY | Facility: CLINIC | Age: 35
End: 2025-05-12

## 2025-05-12 ENCOUNTER — PATIENT OUTREACH (OUTPATIENT)
Dept: ADMINISTRATIVE | Facility: CLINIC | Age: 35
End: 2025-05-12
Payer: COMMERCIAL

## 2025-05-12 NOTE — PROGRESS NOTES
C3 nurse attempted to contact Miquel Salcedo  for a TCC post hospital discharge follow up call. No answer. LVM requesting a callback at [phone].    The patient does not have a scheduled HOSFU appointment. Message sent to PCP's staff to assist with HOSFU appointment scheduling.

## 2025-05-14 ENCOUNTER — OFFICE VISIT (OUTPATIENT)
Dept: OTOLARYNGOLOGY | Facility: CLINIC | Age: 35
End: 2025-05-14
Payer: COMMERCIAL

## 2025-05-14 VITALS — BODY MASS INDEX: 24.98 KG/M2 | WEIGHT: 123.69 LBS

## 2025-05-14 DIAGNOSIS — H93.A1 PULSATILE TINNITUS, RIGHT EAR: ICD-10-CM

## 2025-05-14 DIAGNOSIS — K21.9 LPRD (LARYNGOPHARYNGEAL REFLUX DISEASE): ICD-10-CM

## 2025-05-14 DIAGNOSIS — H69.93 ETD (EUSTACHIAN TUBE DYSFUNCTION), BILATERAL: ICD-10-CM

## 2025-05-14 DIAGNOSIS — R49.0 DYSPHONIA: Primary | ICD-10-CM

## 2025-05-14 PROCEDURE — 4010F ACE/ARB THERAPY RXD/TAKEN: CPT | Mod: CPTII,S$GLB,, | Performed by: NURSE PRACTITIONER

## 2025-05-14 PROCEDURE — 99999 PR PBB SHADOW E&M-EST. PATIENT-LVL IV: CPT | Mod: PBBFAC,,, | Performed by: NURSE PRACTITIONER

## 2025-05-14 PROCEDURE — 99203 OFFICE O/P NEW LOW 30 MIN: CPT | Mod: 25,S$GLB,, | Performed by: NURSE PRACTITIONER

## 2025-05-14 PROCEDURE — 1159F MED LIST DOCD IN RCRD: CPT | Mod: CPTII,S$GLB,, | Performed by: NURSE PRACTITIONER

## 2025-05-14 PROCEDURE — 31575 DIAGNOSTIC LARYNGOSCOPY: CPT | Mod: S$GLB,,, | Performed by: NURSE PRACTITIONER

## 2025-05-14 PROCEDURE — 3066F NEPHROPATHY DOC TX: CPT | Mod: CPTII,S$GLB,, | Performed by: NURSE PRACTITIONER

## 2025-05-14 PROCEDURE — 3044F HG A1C LEVEL LT 7.0%: CPT | Mod: CPTII,S$GLB,, | Performed by: NURSE PRACTITIONER

## 2025-05-14 PROCEDURE — 3008F BODY MASS INDEX DOCD: CPT | Mod: CPTII,S$GLB,, | Performed by: NURSE PRACTITIONER

## 2025-05-14 RX ORDER — FAMOTIDINE 20 MG/1
20 TABLET, FILM COATED ORAL 2 TIMES DAILY
Qty: 60 TABLET | Refills: 11 | Status: SHIPPED | OUTPATIENT
Start: 2025-05-14 | End: 2026-05-14

## 2025-05-14 RX ORDER — FLUTICASONE PROPIONATE 50 MCG
1 SPRAY, SUSPENSION (ML) NASAL 2 TIMES DAILY
Qty: 16 G | Refills: 12 | Status: SHIPPED | OUTPATIENT
Start: 2025-05-14 | End: 2026-05-14

## 2025-05-14 NOTE — PATIENT INSTRUCTIONS
"LARYNGOPHARYNGEAL REFLUX  (SILENT OR ATYPICAL REFLUX)  If you have any of the following symptoms you may have laryngopharyngeal reflux (LPR):  frequent sore throat, thick or too much mucus, chronic throat clearing, voice changes, "lump" sensation, chronic cough, especially cough that wake you up from sleep, chronic "postnasal drip" without the need to blow your nose.      LPR is also called extra esophageal reflux. This means that if you have reflux into the tissues above the esophagus (into the voicebox,) you may experience throat issues as above.      Many people with LPR do not have symptoms of heartburn. Compared to the esophagus, the voice box and the back of the throat are significantly more sensitive to the effects of acid and digestive enzymes on surrounding tissue. Acid passing quickly through the esophagus does not have a chance to irritate the area for too long.  However acid that pools in the throat or voice box can cause prolonged irritation resulting in the symptoms of LPR.     The symptoms of LPR can consist of a dry cough and the sensation of something being stuck in the throat.  Some people will complain of heartburn while others may have intermittent hoarseness or loss of voice.  Another major symptom of LPR is "postnasal drip."  Patients are often told symptoms are due to abnormal nasal drainage or sinus infection; however this is rarely the cause of chronic throat irritation. For post nasal drip to cause the complaints described, signs and symptoms of an active nasal infection should be present.      Treatments for LPR include: postural changes (sleeping or laying with an incline), weight reduction, diet modification, medication to reduce stomach acid and promote normal motility, and rarely: surgery to prevent reflux. Most patients will begin to notice some relief in her symptoms about 2-4 weeks after starting the medication; however it is generally recommended the medication should be continued " for at least 2 months. If the symptoms completely resolve, the medication can then be tapered.  Some people will remain symptom free while others may have relapses which required treatment again.     Things you may be able to do to prevent reflux.  1. Do not smoke.  Smoking will worsen reflux.    2. Avoid eating at least 2-3 hours prior to bedtime.  Try to avoid very large meals at night.    3. Weight loss.  For patient's with recent weight gain, shedding a few pounds is all that is required to improve reflux.    4. Avoid reflux triggers. These can worsen acid in the stomach or even cause the esophagus muscles to relax: caffeine, carbonated water or soft drinks, acidic foods (tomato, fruit), mints, alcoholic beverages, particularly at night, cheese, fried foods, spicy foods, eggs, and chocolate.    5. Sleep with the head of bed elevated at least 6 inches.  6. You may also take over-the-counter acid reducing medications.       Gaviscon can be considered as an added medication for reflux.  Gaviscon is made up of sodium alginate, a natural substance derived from seaweed.  This substance, when swallowed, forms a barrier or raft on the surface of the stomach contents to prevent reflux of stomach contents into the esophagus or throat.  It can be an effective way to manage heartburn or gastroesophageal reflux (GERD).  The benefit is also that it is minimally absorbed into the rest of the body, so you do not have to worry about side effects outside of the stomach.  This can be taken long-term without any issues as long as tolerated well. There are two types of Gaviscon available:  Gaviscon Original can be purchased through most pharmacies or online  Gaviscon Advance is twice as strong as original (without side effects), but can only be purchased online, most easily accessible through [employer]. I find the Advance works far better than the Original.   How to take:  Read the instructions to confirm dosing, but generally it is  10 ml taken 2-3 times daily, or 1 chewable tablet 2-3 times daily.     See a Gastro doctor (GI) for refractory symptoms and continued management.        If you are still dissatisfied with the quality of your voice after 8 weeks of aggressive anti-reflux treatment, return for a video stroboscopy exam with one of our ENT MDs.

## 2025-05-14 NOTE — PROGRESS NOTES
Subjective     Patient ID: Miquel Salcedo is a 34 y.o. male.    Chief Complaint: Hoarse, Sinus Problem, Nasal Congestion, and Ear Fullness    HPI  Patient is new to ENT, referred by Dr. Jefferson for consultation for shortness of breath. Recent discharge summaries from back-to-back hospitalizations have been reviewed in preparation for today's visit. Patient reports profound NAVARRO to the point she can't even take a shower without getting significantly winded. Pt reports stable breathing at rest in bed, denies any current orthopnea, chest pain, palpitations.   Patient reports hoarseness X 2 weeks. Dry cough in the beginning, much better now.  No h/o intubation. No h/o neck or throat surgery. No injuries. No sore throat. Occasional dysphagia which she attributes to her acid reflux.   Patient also reports intermittent aural congestion and intermittent right pulsatile tinnitus. Patient states when she was in the hospital recently, she was experiencing episodes of tachycardia and could hear her pulse in her right ear. Sometimes her right ear feels full like it needs to pop.       Review of Systems   Constitutional: Negative.  Negative for fever.   HENT:  Positive for voice change. Negative for nasal congestion, dental problem, facial swelling, postnasal drip, rhinorrhea, sinus pressure/congestion, sneezing, sore throat and trouble swallowing.    Eyes: Negative.  Negative for discharge, redness and itching.   Respiratory: Negative.  Negative for cough and choking.    Cardiovascular: Negative.    Gastrointestinal: Negative.    Musculoskeletal: Negative.    Integumentary:  Negative.   Neurological: Negative.  Negative for headaches.   Hematological: Negative.    Psychiatric/Behavioral: Negative.          Objective     Physical Exam  Vitals and nursing note reviewed.   Constitutional:       General: She is not in acute distress.     Appearance: She is well-developed. She is not ill-appearing.   HENT:      Head:  Normocephalic and atraumatic.      Right Ear: Hearing, tympanic membrane, ear canal and external ear normal. No middle ear effusion. Tympanic membrane is not erythematous.      Left Ear: Hearing, tympanic membrane, ear canal and external ear normal.  No middle ear effusion. Tympanic membrane is not erythematous.      Nose: Nose normal. No congestion or rhinorrhea.      Right Sinus: No maxillary sinus tenderness or frontal sinus tenderness.      Left Sinus: No maxillary sinus tenderness or frontal sinus tenderness.      Mouth/Throat:      Mouth: Mucous membranes are moist. No oral lesions.      Tongue: No lesions.      Palate: No lesions.      Pharynx: Oropharynx is clear. Uvula midline. No pharyngeal swelling, oropharyngeal exudate, posterior oropharyngeal erythema or uvula swelling.   Eyes:      General: Lids are normal. No scleral icterus.        Right eye: No discharge.         Left eye: No discharge.   Neck:      Trachea: Trachea normal. No tracheal deviation.   Cardiovascular:      Rate and Rhythm: Normal rate.   Pulmonary:      Effort: Pulmonary effort is normal. No respiratory distress.      Breath sounds: No stridor. No wheezing.   Musculoskeletal:         General: Normal range of motion.      Cervical back: Normal range of motion.   Lymphadenopathy:      Cervical: No cervical adenopathy.   Skin:     General: Skin is warm and dry.   Neurological:      Mental Status: She is alert and oriented to person, place, and time.      Coordination: Coordination is intact.      Gait: Gait normal.   Psychiatric:         Attention and Perception: Attention normal.         Mood and Affect: Mood normal.         Speech: Speech normal.         Behavior: Behavior normal. Behavior is cooperative.     Procedure: Flexible laryngoscopy    In order to fully examine the upper aerodigestive tract, including the larynx, in a patient with a hyperactive gag reflex, and suboptimal visualization with indirect mirror exam,  flexible  endoscopy is required.   After explaining the procedure and obtaining verbal consent, a timeout was performed with the patient's participation according to the universal protocol. Both nasal cavities were anesthetized with 4% Xylocaine spray mixed with Harlan-Synephrine. The flexible laryngoscope  was inserted into the nasal cavity and advanced to visualize the nasal cavity, nasopharynx, the posterior oropharynx, hypopharynx, and the endolarynx with the  findings noted. The scope was removed and the procedure terminated. The patient tolerated this procedure well without apparent complication.     OVERALL FINDINGS  Nasopharynx - the torus is clear. There are no lesions of the posterior wall.   Oropharynx - no lesions of the tongue base. There is no obvious fullness or asymmetry.  Hypopharynx - there are no lesions of the pyriform sinuses or postcricoid region   Larynx - there are no lesions of the supraglottic or glottic larynx.  Vocal fold mobility is normal.     SPECIFIC FINDINGS  Adenoid tissue - normal   Nasopharynx & eustachian tube orifices - normal   Posterior pharyngeal wall - normal   Base of tongue - normal   Epiglottis - normal   Valleculae - normal   Pyriform sinuses - normal   False vocal cords - normal   True vocal cords - normal  Arytenoids - normal   Interarytenoid space - edema                     Assessment and Plan     1. Dysphonia    2. LPRD (laryngopharyngeal reflux disease)  -     Ambulatory referral/consult to ENT  -     famotidine (PEPCID) 20 MG tablet; Take 1 tablet (20 mg total) by mouth 2 (two) times daily.  Dispense: 60 tablet; Refill: 11    3. ETD (Eustachian tube dysfunction), bilateral  -     fluticasone propionate (FLONASE) 50 mcg/actuation nasal spray; 1 spray (50 mcg total) by Each Nostril route 2 (two) times daily.  Dispense: 16 g; Refill: 12    4. Pulsatile tinnitus, right ear      Reassured normal laryngeal exam. Consider video strobe if not improving. Advised/Cautioned: The results  of today's ENT exam and flexible endoscopy were detailed to the patient and all questions were answered. Patient education centered around GERD, known exacerbants and contemporary treatment options. Laryngoscope photos were given and reviewed with the patient in detail. Handouts given on LPRD and GERD were given to the patient. After review of these, patient elected to be placed on H2-blocker BID. I encouraged the patient once she has completed the evening meal to not snack or consume any other food products or caffeinated beverages for at least  minutes before retiring. Finally, I encouraged the patient to sleep about 30 degrees above horizontal, and this can be facilitated by using 2-3 pillows or a wedge foam product. If the patient is not demonstrably improved in 6-8 weeks, consultation with gastroenterology may be indicated to rule out intrinsic disease in the lower esophagus, stomach, or proximal duodenum.   Discussed Eustachian tube congestion and aural fullness.  Flonase for ETD.    Discussed not unusual to be able to hear pulse in ear during times of tachycardia or elevated blood pressure or emotional stress. Discussed anatomical vascular idiosyncrasies that make it possible to hear in one ear only.  If persistent, check blood pressure. Reduce salt and caffeine. If pulsatile tinnitus becomes more persistent, will discuss monitoring whether firm compression of the jugular vein on the affected side resolves the sound for possible consideration of CTA of head & neck to check for vascular pathology.          No follow-ups on file.

## 2025-05-15 RX ORDER — CALCITRIOL 0.25 UG/1
0.25 CAPSULE ORAL DAILY
Qty: 90 CAPSULE | Refills: 1 | Status: SHIPPED | OUTPATIENT
Start: 2025-05-15 | End: 2025-11-11

## 2025-05-21 ENCOUNTER — PATIENT MESSAGE (OUTPATIENT)
Dept: NEPHROLOGY | Facility: CLINIC | Age: 35
End: 2025-05-21
Payer: COMMERCIAL

## 2025-05-26 ENCOUNTER — LAB VISIT (OUTPATIENT)
Dept: LAB | Facility: HOSPITAL | Age: 35
End: 2025-05-26
Attending: INTERNAL MEDICINE
Payer: COMMERCIAL

## 2025-05-26 ENCOUNTER — CLINICAL SUPPORT (OUTPATIENT)
Dept: AUDIOLOGY | Facility: CLINIC | Age: 35
End: 2025-05-26
Payer: COMMERCIAL

## 2025-05-26 DIAGNOSIS — H90.3 ASYMMETRICAL SENSORINEURAL HEARING LOSS: Primary | ICD-10-CM

## 2025-05-26 DIAGNOSIS — N18.32 CKD STAGE 3B, GFR 30-44 ML/MIN: ICD-10-CM

## 2025-05-26 LAB
ALBUMIN SERPL BCP-MCNC: 3.5 G/DL (ref 3.5–5.2)
ANION GAP (OHS): 11 MMOL/L (ref 8–16)
BUN SERPL-MCNC: 45 MG/DL (ref 6–20)
CALCIUM SERPL-MCNC: 9.1 MG/DL (ref 8.7–10.5)
CHLORIDE SERPL-SCNC: 103 MMOL/L (ref 95–110)
CO2 SERPL-SCNC: 23 MMOL/L (ref 23–29)
CREAT SERPL-MCNC: 3.3 MG/DL (ref 0.5–1.4)
CREAT UR-MCNC: 137 MG/DL (ref 23–375)
GFR SERPLBLD CREATININE-BSD FMLA CKD-EPI: 24 ML/MIN/1.73/M2
GLUCOSE SERPL-MCNC: 131 MG/DL (ref 70–110)
PHOSPHATE SERPL-MCNC: 3.7 MG/DL (ref 2.7–4.5)
POTASSIUM SERPL-SCNC: 4.7 MMOL/L (ref 3.5–5.1)
PROT UR-MCNC: 31 MG/DL
PROT/CREAT UR: 0.23 MG/G{CREAT}
PTH-INTACT SERPL-MCNC: 131.5 PG/ML (ref 9–77)
SODIUM SERPL-SCNC: 137 MMOL/L (ref 136–145)

## 2025-05-26 PROCEDURE — 80069 RENAL FUNCTION PANEL: CPT

## 2025-05-26 PROCEDURE — 84156 ASSAY OF PROTEIN URINE: CPT

## 2025-05-26 PROCEDURE — 99999 PR PBB SHADOW E&M-EST. PATIENT-LVL I: CPT | Mod: PBBFAC,,,

## 2025-05-26 PROCEDURE — 92557 COMPREHENSIVE HEARING TEST: CPT | Mod: S$GLB,,,

## 2025-05-26 PROCEDURE — 36415 COLL VENOUS BLD VENIPUNCTURE: CPT | Mod: PO

## 2025-05-26 PROCEDURE — 83970 ASSAY OF PARATHORMONE: CPT

## 2025-05-26 PROCEDURE — 92567 TYMPANOMETRY: CPT | Mod: S$GLB,,,

## 2025-05-26 NOTE — Clinical Note
Please see attached audiologic test results and recommendations. Patient would like you to contact her with any further recommendations you may have. Thank you.

## 2025-05-26 NOTE — PROGRESS NOTES
Miquel Salcedo was seen on 05/26/2025 for an audiological evaluation. Patient was alone during today's visit. Pertinent complaints today include hearing loss and tinnitus (AD). Symptoms began about 6 months ago. Patient denies history of loud noise exposure and confirms early onset of genetic family history of hearing loss. Otoscopy revealed minimal cerumen in both ears. The tympanic membrane was visualized AU prior to proceeding with the hearing testing.     Results reveal a moderate rising to mild sensorineural hearing loss for the right ear and  mild high frequency hearing loss for the left ear.    Speech Reception Thresholds were  30 dBHL for the right ear and 15 dBHL for the left ear.    Word recognition scores were excellent for the right ear and excellent for the left ear.   Tympanograms were Type A for the right ear and Type A for the left ear.    The recommendations were as follows:  (1) Medical evaluation for asymmetrical hearing loss  (2) Hearing aid consultation following medical clearance   (3) Hearing evaluation in one year or sooner if hearing decrease is noted    (4) Ear protection in loud noise      Today's test results and recommendations were discussed with the patient.

## 2025-05-27 ENCOUNTER — RESULTS FOLLOW-UP (OUTPATIENT)
Dept: NEPHROLOGY | Facility: CLINIC | Age: 35
End: 2025-05-27

## 2025-05-27 ENCOUNTER — TELEPHONE (OUTPATIENT)
Dept: CARDIOLOGY | Facility: CLINIC | Age: 35
End: 2025-05-27
Payer: COMMERCIAL

## 2025-05-27 DIAGNOSIS — H90.41 SENSORINEURAL HEARING LOSS (SNHL) OF RIGHT EAR WITH UNRESTRICTED HEARING OF LEFT EAR: Primary | ICD-10-CM

## 2025-05-27 NOTE — TELEPHONE ENCOUNTER
Pt called to get a cardiac preop evaluation before 6/2.  No appt available in [place] or [place] before sx.  Patient agreed to 6/3 and will reschedule the bx

## 2025-05-28 ENCOUNTER — TELEPHONE (OUTPATIENT)
Dept: NEPHROLOGY | Facility: CLINIC | Age: 35
End: 2025-05-28

## 2025-05-28 ENCOUNTER — OFFICE VISIT (OUTPATIENT)
Dept: NEPHROLOGY | Facility: CLINIC | Age: 35
End: 2025-05-28
Payer: COMMERCIAL

## 2025-05-28 VITALS
DIASTOLIC BLOOD PRESSURE: 58 MMHG | HEIGHT: 60 IN | BODY MASS INDEX: 24.98 KG/M2 | HEART RATE: 84 BPM | SYSTOLIC BLOOD PRESSURE: 136 MMHG

## 2025-05-28 DIAGNOSIS — N18.4 CHRONIC KIDNEY DISEASE (CKD), STAGE IV (SEVERE): Primary | ICD-10-CM

## 2025-05-28 DIAGNOSIS — E87.20 METABOLIC ACIDOSIS: ICD-10-CM

## 2025-05-28 DIAGNOSIS — I10 HYPERTENSION, UNSPECIFIED TYPE: ICD-10-CM

## 2025-05-28 DIAGNOSIS — D63.1 ANEMIA IN STAGE 3B CHRONIC KIDNEY DISEASE: ICD-10-CM

## 2025-05-28 DIAGNOSIS — N18.32 ANEMIA IN STAGE 3B CHRONIC KIDNEY DISEASE: ICD-10-CM

## 2025-05-28 DIAGNOSIS — N25.81 SECONDARY HYPERPARATHYROIDISM OF RENAL ORIGIN: ICD-10-CM

## 2025-05-28 PROCEDURE — 99999 PR PBB SHADOW E&M-EST. PATIENT-LVL II: CPT | Mod: PBBFAC,,, | Performed by: INTERNAL MEDICINE

## 2025-05-28 PROCEDURE — 4010F ACE/ARB THERAPY RXD/TAKEN: CPT | Mod: CPTII,S$GLB,, | Performed by: INTERNAL MEDICINE

## 2025-05-28 PROCEDURE — 3008F BODY MASS INDEX DOCD: CPT | Mod: CPTII,S$GLB,, | Performed by: INTERNAL MEDICINE

## 2025-05-28 PROCEDURE — 3078F DIAST BP <80 MM HG: CPT | Mod: CPTII,S$GLB,, | Performed by: INTERNAL MEDICINE

## 2025-05-28 PROCEDURE — 99214 OFFICE O/P EST MOD 30 MIN: CPT | Mod: S$GLB,,, | Performed by: INTERNAL MEDICINE

## 2025-05-28 PROCEDURE — 3075F SYST BP GE 130 - 139MM HG: CPT | Mod: CPTII,S$GLB,, | Performed by: INTERNAL MEDICINE

## 2025-05-28 PROCEDURE — 3066F NEPHROPATHY DOC TX: CPT | Mod: CPTII,S$GLB,, | Performed by: INTERNAL MEDICINE

## 2025-05-28 PROCEDURE — 3044F HG A1C LEVEL LT 7.0%: CPT | Mod: CPTII,S$GLB,, | Performed by: INTERNAL MEDICINE

## 2025-06-03 PROBLEM — I50.30 DIASTOLIC HF (HEART FAILURE): Status: ACTIVE | Noted: 2025-06-03

## 2025-06-03 PROBLEM — R73.03 PREDIABETES: Status: ACTIVE | Noted: 2025-06-03

## 2025-06-03 PROBLEM — N17.9 AKI (ACUTE KIDNEY INJURY): Status: ACTIVE | Noted: 2025-06-03

## 2025-06-03 PROBLEM — D64.9 SYMPTOMATIC ANEMIA: Status: ACTIVE | Noted: 2024-08-09

## 2025-06-03 PROBLEM — N18.9 ACUTE RENAL FAILURE SUPERIMPOSED ON CHRONIC KIDNEY DISEASE: Status: ACTIVE | Noted: 2025-06-03

## 2025-06-03 PROBLEM — R07.89 CHEST TIGHTNESS: Status: ACTIVE | Noted: 2025-06-03

## 2025-06-04 PROBLEM — E11.9 TYPE 2 DIABETES MELLITUS: Status: ACTIVE | Noted: 2025-06-03

## 2025-06-05 DIAGNOSIS — Q23.81 BICUSPID AORTIC VALVE: Primary | ICD-10-CM

## 2025-06-05 DIAGNOSIS — I35.1 AORTIC VALVE INSUFFICIENCY, ETIOLOGY OF CARDIAC VALVE DISEASE UNSPECIFIED: ICD-10-CM

## 2025-06-05 PROBLEM — R07.89 CHEST TIGHTNESS: Status: RESOLVED | Noted: 2025-06-03 | Resolved: 2025-06-05

## 2025-06-17 ENCOUNTER — HOSPITAL ENCOUNTER (OUTPATIENT)
Dept: RADIOLOGY | Facility: HOSPITAL | Age: 35
Discharge: HOME | End: 2025-06-17
Attending: NURSE PRACTITIONER
Payer: COMMERCIAL

## 2025-06-17 ENCOUNTER — OFFICE VISIT (OUTPATIENT)
Dept: NEPHROLOGY | Facility: CLINIC | Age: 35
End: 2025-06-17
Payer: COMMERCIAL

## 2025-06-17 VITALS — DIASTOLIC BLOOD PRESSURE: 84 MMHG | OXYGEN SATURATION: 98 % | HEART RATE: 81 BPM | SYSTOLIC BLOOD PRESSURE: 122 MMHG

## 2025-06-17 DIAGNOSIS — N25.81 SECONDARY HYPERPARATHYROIDISM OF RENAL ORIGIN: ICD-10-CM

## 2025-06-17 DIAGNOSIS — N18.4 CHRONIC KIDNEY DISEASE (CKD), STAGE IV (SEVERE): Primary | ICD-10-CM

## 2025-06-17 DIAGNOSIS — I10 HYPERTENSION, UNSPECIFIED TYPE: ICD-10-CM

## 2025-06-17 DIAGNOSIS — E87.20 METABOLIC ACIDOSIS: ICD-10-CM

## 2025-06-17 DIAGNOSIS — H90.41 SENSORINEURAL HEARING LOSS (SNHL) OF RIGHT EAR WITH UNRESTRICTED HEARING OF LEFT EAR: ICD-10-CM

## 2025-06-17 DIAGNOSIS — D63.1 ANEMIA IN STAGE 3B CHRONIC KIDNEY DISEASE: ICD-10-CM

## 2025-06-17 DIAGNOSIS — H90.5 SNHL (SENSORINEURAL HEARING LOSS): ICD-10-CM

## 2025-06-17 DIAGNOSIS — H90.5 SNHL (SENSORINEURAL HEARING LOSS): Primary | ICD-10-CM

## 2025-06-17 DIAGNOSIS — N18.32 ANEMIA IN STAGE 3B CHRONIC KIDNEY DISEASE: ICD-10-CM

## 2025-06-17 PROCEDURE — 70551 MRI BRAIN STEM W/O DYE: CPT | Mod: TC,PO

## 2025-06-17 PROCEDURE — 3044F HG A1C LEVEL LT 7.0%: CPT | Mod: CPTII,S$GLB,, | Performed by: INTERNAL MEDICINE

## 2025-06-17 PROCEDURE — 3074F SYST BP LT 130 MM HG: CPT | Mod: CPTII,S$GLB,, | Performed by: INTERNAL MEDICINE

## 2025-06-17 PROCEDURE — 1160F RVW MEDS BY RX/DR IN RCRD: CPT | Mod: CPTII,S$GLB,, | Performed by: INTERNAL MEDICINE

## 2025-06-17 PROCEDURE — 76000 FLUOROSCOPY <1 HR PHYS/QHP: CPT | Mod: TC,PO

## 2025-06-17 PROCEDURE — 70551 MRI BRAIN STEM W/O DYE: CPT | Mod: 26,,, | Performed by: RADIOLOGY

## 2025-06-17 PROCEDURE — 3066F NEPHROPATHY DOC TX: CPT | Mod: CPTII,S$GLB,, | Performed by: INTERNAL MEDICINE

## 2025-06-17 PROCEDURE — 3079F DIAST BP 80-89 MM HG: CPT | Mod: CPTII,S$GLB,, | Performed by: INTERNAL MEDICINE

## 2025-06-17 PROCEDURE — 99999 PR PBB SHADOW E&M-EST. PATIENT-LVL III: CPT | Mod: PBBFAC,,, | Performed by: INTERNAL MEDICINE

## 2025-06-17 PROCEDURE — 4010F ACE/ARB THERAPY RXD/TAKEN: CPT | Mod: CPTII,S$GLB,, | Performed by: INTERNAL MEDICINE

## 2025-06-17 PROCEDURE — 99214 OFFICE O/P EST MOD 30 MIN: CPT | Mod: S$GLB,,, | Performed by: INTERNAL MEDICINE

## 2025-06-17 PROCEDURE — 1159F MED LIST DOCD IN RCRD: CPT | Mod: CPTII,S$GLB,, | Performed by: INTERNAL MEDICINE

## 2025-06-17 NOTE — PROGRESS NOTES
Subjective:        Patient ID: Miquel Salcedo is a 34 y.o. White female who presents for return patient evaluation for chronic renal failure.    Taking torsemide daily, weighs daily, dry weight 118-119  Reports of dyspnea being at baseline  Echo in hospital consistent with bicuspid aortic valve, mod-to-severe AR    Review of Systems   All other systems reviewed and are negative.      The past medical, family and social histories were reviewed for this encounter.     /84 (BP Location: Left arm, Patient Position: Sitting)   Pulse 81   SpO2 98%     Objective:      Physical Exam  Vitals reviewed.   Constitutional:       Appearance: Normal appearance.   HENT:      Head: Normocephalic and atraumatic.   Musculoskeletal:      Right lower leg: No edema.      Left lower leg: No edema.   Skin:     General: Skin is warm and dry.   Neurological:      General: No focal deficit present.      Mental Status: She is alert and oriented to person, place, and time.   Psychiatric:         Mood and Affect: Mood normal.         Behavior: Behavior normal.         Assessment:       1. Chronic kidney disease (CKD), stage IV (severe)    2. Hypertension, unspecified type    3. Secondary hyperparathyroidism of renal origin    4. Metabolic acidosis    5. Anemia in stage 3b chronic kidney disease                Lab Results   Component Value Date    CREATININE 3.5 (H) 06/17/2025    BUN 51 (H) 06/05/2025     06/05/2025    K 4.4 06/05/2025     06/05/2025    CO2 27 06/05/2025     Lab Results   Component Value Date    .5 (H) 05/26/2025    CALCIUM 9.1 06/05/2025    PHOS 2.9 06/05/2025     Lab Results   Component Value Date    HCT 29.5 (L) 06/05/2025     Urine Protein/Creatinine Ratio   Date Value Ref Range Status   05/26/2025 0.23 (H) <=0.20 Final   04/09/2025 0.32 (H) <=0.20 Final     Prot/Creat Ratio, Urine   Date Value Ref Range Status   06/04/2025 0.4 (H) 0.0 - 0.2 Final   02/21/2025 0.49 (H) 0.00 - 0.20 Final    01/09/2025 1.05 (H) 0.00 - 0.20 Final      Plan:   Return to clinic August 1st (previous appt).  Labs for next visit include rfp, pth, upc, cbc, iron studies.  Baseline creatinine is 1.8-2.1 since 2022. UPC 0.49, CKD clinically due to diabetic nephropathy, patient also has retinopathy. Renal function progressively declining without an obvious cause, will refer for kidney biopsy. Concern also for possible CRS.  Bicarb 27, repeat lab work soon  -->132, Ca 8.7, continue calcitriol  Hct 29.5, s/p transfusion in hospital repeat labs soon  BP controlled off losartan, will monitor for now.

## 2025-06-23 ENCOUNTER — RESULTS FOLLOW-UP (OUTPATIENT)
Dept: OTOLARYNGOLOGY | Facility: CLINIC | Age: 35
End: 2025-06-23

## 2025-06-23 ENCOUNTER — TELEPHONE (OUTPATIENT)
Dept: OTOLARYNGOLOGY | Facility: CLINIC | Age: 35
End: 2025-06-23
Payer: COMMERCIAL

## 2025-06-23 NOTE — TELEPHONE ENCOUNTER
----- Message from Deena Escalante NP sent at 6/23/2025 10:45 AM CDT -----  Nothing on MRI to explain the right-sided hearing loss. Possibly consider a right-sided hearing aid. Otherwise, no concerns.   ----- Message -----  From: Dewey, Rad Results In  Sent: 6/17/2025   2:14 PM CDT  To: Deena Escalante NP

## 2025-06-24 ENCOUNTER — LAB VISIT (OUTPATIENT)
Dept: LAB | Facility: HOSPITAL | Age: 35
End: 2025-06-24
Attending: INTERNAL MEDICINE
Payer: COMMERCIAL

## 2025-06-24 ENCOUNTER — PATIENT MESSAGE (OUTPATIENT)
Dept: ENDOCRINOLOGY | Facility: CLINIC | Age: 35
End: 2025-06-24
Payer: COMMERCIAL

## 2025-06-24 DIAGNOSIS — N18.4 CHRONIC KIDNEY DISEASE (CKD), STAGE IV (SEVERE): ICD-10-CM

## 2025-06-24 LAB
ABSOLUTE EOSINOPHIL (OHS): 0.35 K/UL
ABSOLUTE MONOCYTE (OHS): 0.87 K/UL (ref 0.3–1)
ABSOLUTE NEUTROPHIL COUNT (OHS): 5.24 K/UL (ref 1.8–7.7)
ALBUMIN SERPL BCP-MCNC: 4.1 G/DL (ref 3.5–5.2)
ANION GAP (OHS): 15 MMOL/L (ref 8–16)
BASOPHILS # BLD AUTO: 0.13 K/UL
BASOPHILS NFR BLD AUTO: 1.6 %
BUN SERPL-MCNC: 69 MG/DL (ref 6–20)
CALCIUM SERPL-MCNC: 9.3 MG/DL (ref 8.7–10.5)
CHLORIDE SERPL-SCNC: 99 MMOL/L (ref 95–110)
CO2 SERPL-SCNC: 23 MMOL/L (ref 23–29)
CREAT SERPL-MCNC: 4.3 MG/DL (ref 0.5–1.4)
CREAT UR-MCNC: 179 MG/DL (ref 23–375)
ERYTHROCYTE [DISTWIDTH] IN BLOOD BY AUTOMATED COUNT: 13 % (ref 11.5–14.5)
FERRITIN SERPL-MCNC: 2288 NG/ML (ref 20–300)
GFR SERPLBLD CREATININE-BSD FMLA CKD-EPI: 18 ML/MIN/1.73/M2
GLUCOSE SERPL-MCNC: 130 MG/DL (ref 70–110)
HCT VFR BLD AUTO: 38.1 % (ref 40–54)
HGB BLD-MCNC: 12 GM/DL (ref 14–18)
IMM GRANULOCYTES # BLD AUTO: 0.03 K/UL (ref 0–0.04)
IMM GRANULOCYTES NFR BLD AUTO: 0.4 % (ref 0–0.5)
IRON SATN MFR SERPL: 68 % (ref 20–50)
IRON SERPL-MCNC: 235 UG/DL (ref 45–160)
LYMPHOCYTES # BLD AUTO: 1.72 K/UL (ref 1–4.8)
MCH RBC QN AUTO: 27.4 PG (ref 27–31)
MCHC RBC AUTO-ENTMCNC: 31.5 G/DL (ref 32–36)
MCV RBC AUTO: 87 FL (ref 82–98)
NUCLEATED RBC (/100WBC) (OHS): 0 /100 WBC
PHOSPHATE SERPL-MCNC: 4.3 MG/DL (ref 2.7–4.5)
PLATELET # BLD AUTO: 273 K/UL (ref 150–450)
PMV BLD AUTO: 10.5 FL (ref 9.2–12.9)
POTASSIUM SERPL-SCNC: 5 MMOL/L (ref 3.5–5.1)
PROT UR-MCNC: 44 MG/DL
PROT/CREAT UR: 0.25 MG/G{CREAT}
PTH-INTACT SERPL-MCNC: 195.1 PG/ML (ref 9–77)
RBC # BLD AUTO: 4.38 M/UL (ref 4.6–6.2)
RELATIVE EOSINOPHIL (OHS): 4.2 %
RELATIVE LYMPHOCYTE (OHS): 20.6 % (ref 18–48)
RELATIVE MONOCYTE (OHS): 10.4 % (ref 4–15)
RELATIVE NEUTROPHIL (OHS): 62.8 % (ref 38–73)
SODIUM SERPL-SCNC: 137 MMOL/L (ref 136–145)
TIBC SERPL-MCNC: 348 UG/DL (ref 250–450)
TRANSFERRIN SERPL-MCNC: 235 MG/DL (ref 200–375)
WBC # BLD AUTO: 8.34 K/UL (ref 3.9–12.7)

## 2025-06-24 PROCEDURE — 83970 ASSAY OF PARATHORMONE: CPT

## 2025-06-24 PROCEDURE — 85025 COMPLETE CBC W/AUTO DIFF WBC: CPT

## 2025-06-24 PROCEDURE — 83540 ASSAY OF IRON: CPT

## 2025-06-24 PROCEDURE — 36415 COLL VENOUS BLD VENIPUNCTURE: CPT | Mod: PO

## 2025-06-24 PROCEDURE — 80069 RENAL FUNCTION PANEL: CPT

## 2025-06-24 PROCEDURE — 82570 ASSAY OF URINE CREATININE: CPT

## 2025-06-24 PROCEDURE — 82728 ASSAY OF FERRITIN: CPT

## 2025-07-03 ENCOUNTER — OFFICE VISIT (OUTPATIENT)
Dept: CARDIOLOGY | Facility: CLINIC | Age: 35
End: 2025-07-03
Payer: COMMERCIAL

## 2025-07-03 VITALS
BODY MASS INDEX: 24.71 KG/M2 | DIASTOLIC BLOOD PRESSURE: 73 MMHG | SYSTOLIC BLOOD PRESSURE: 117 MMHG | HEIGHT: 60 IN | WEIGHT: 125.88 LBS | HEART RATE: 86 BPM

## 2025-07-03 DIAGNOSIS — I35.1 SEVERE AORTIC REGURGITATION: Primary | ICD-10-CM

## 2025-07-03 DIAGNOSIS — I10 HYPERTENSION, UNSPECIFIED TYPE: ICD-10-CM

## 2025-07-03 DIAGNOSIS — I50.32 CHRONIC DIASTOLIC HEART FAILURE: ICD-10-CM

## 2025-07-03 DIAGNOSIS — E78.2 MIXED DYSLIPIDEMIA: ICD-10-CM

## 2025-07-03 PROCEDURE — 99214 OFFICE O/P EST MOD 30 MIN: CPT | Mod: S$GLB,,,

## 2025-07-03 PROCEDURE — 3044F HG A1C LEVEL LT 7.0%: CPT | Mod: CPTII,S$GLB,,

## 2025-07-03 PROCEDURE — 4010F ACE/ARB THERAPY RXD/TAKEN: CPT | Mod: CPTII,S$GLB,,

## 2025-07-03 PROCEDURE — 3008F BODY MASS INDEX DOCD: CPT | Mod: CPTII,S$GLB,,

## 2025-07-03 PROCEDURE — 3066F NEPHROPATHY DOC TX: CPT | Mod: CPTII,S$GLB,,

## 2025-07-03 PROCEDURE — 1159F MED LIST DOCD IN RCRD: CPT | Mod: CPTII,S$GLB,,

## 2025-07-03 PROCEDURE — 99999 PR PBB SHADOW E&M-EST. PATIENT-LVL III: CPT | Mod: PBBFAC,,,

## 2025-07-03 PROCEDURE — 3074F SYST BP LT 130 MM HG: CPT | Mod: CPTII,S$GLB,,

## 2025-07-03 PROCEDURE — 3078F DIAST BP <80 MM HG: CPT | Mod: CPTII,S$GLB,,

## 2025-07-03 NOTE — PROGRESS NOTES
Subjective:    Patient ID:  Miquel Salcedo is a 34 y.o. male patient here for evaluation Hospital Follow Up    History of Present Illness:     States she is doing well. Blood pressure has been good. Denies SOB, chest pain, palpitations, swelling.          Most Recent Echocardiogram Results  Results for orders placed during the hospital encounter of 06/03/25    Echo    Interpretation Summary    Left Ventricle: The left ventricle is normal in size. Normal wall thickness. There is normal systolic function with a visually estimated ejection fraction of 60 - 65%.    Right Ventricle: The right ventricle is normal in size Wall thickness is normal. Systolic function is normal.    Aortic Valve: The aortic valve is a bicuspid valve. There is moderate aortic valve sclerosis. Mildly restricted motion. There is mild stenosis. Aortic valve peak velocity is 2.1 m/s. There is moderate to severe aortic regurgitation.    Mitral Valve: There is mild regurgitation.    Tricuspid Valve: There is moderate regurgitation.    Pulmonary Artery: No pulmonary hypertension. The estimated pulmonary artery systolic pressure is 28 mmHg.    IVC/SVC: Normal venous pressure at 3 mmHg.    Limited echo study to reassess aortic insufficiency/stenosis.      Most Recent Nuclear Stress Test Results  No results found for this or any previous visit.      Most Recent Cardiac PET Stress Test Results  No results found for this or any previous visit.      Most Recent Cardiovascular Angiogram results  No results found for this or any previous visit.      Other Most Recent Cardiology Results  Results for orders placed during the hospital encounter of 06/03/25    Cardiac monitoring strips      REVIEW OF SYSTEMS: As noted in HPI   CARDIOVASCULAR: No recent chest pain, palpitations, arm/neck/jaw pain, or edema.  RESPIRATORY: No recent fever, cough, SOB.  : No blood in the urine  GI: No reflux, nausea, vomiting, or blood in stool.   MUSCULOSKELETAL: No  falls.   NEURO: No headaches, syncope, or dizziness.  EYES: No sudden changes in vision.     Past Medical History:   Diagnosis Date    Astrocytoma brain tumor     Diabetes mellitus     Hypothyroid     Macular degeneration     OU     Past Surgical History:   Procedure Laterality Date    CSF SHUNT      SOFT TISSUE TUMOR RESECTION      patient was about 5 yr of age     Social History[1]      Objective    There were no vitals filed for this visit.    LAST EKG  Results for orders placed or performed during the hospital encounter of 06/03/25   EKG 12-lead    Collection Time: 06/03/25 11:46 AM   Result Value Ref Range    QRS Duration 82 ms    [facility] QTC Calculation 472 ms    Narrative    Test Reason : R07.9,    Vent. Rate :  89 BPM     Atrial Rate :  89 BPM     P-R Int : 148 ms          QRS Dur :  82 ms      QT Int : 388 ms       P-R-T Axes :  66  78  84 degrees    QTcB Int : 472 ms    Normal sinus rhythm  Cannot rule out Anterior infarct ,age undetermined  Abnormal ECG  When compared with ECG of 06-May-2025 15:25,  T wave inversion no longer evident in Inferior leads  Confirmed by Paulo Pacheco ([id number]) on 6/3/2025 5:15:44 PM    Referred By: DARIA PINEDA           Confirmed By: Paulo Pacheco     LIPIDS - LAST 2   Lab Results   Component Value Date    CHOL 183 05/02/2025    CHOL 215 (H) 06/24/2024    HDL 66 05/02/2025    HDL 74 06/24/2024    LDLCALC 81 05/02/2025    LDLCALC 105.6 06/24/2024    TRIG 180 05/02/2025    TRIG 177 (H) 06/24/2024    CHOLHDL 2.8 05/02/2025    CHOLHDL 34.4 06/24/2024     CARDIAC PROFILE - LAST 2  Lab Results   Component Value Date     (H) 05/06/2025      CBC - LAST 2  Lab Results   Component Value Date    WBC 9.45 06/30/2025    WBC 8.34 06/24/2025    HGB 11.7 (L) 06/30/2025    HGB 12.0 (L) 06/24/2025    HCT 34.3 (L) 06/30/2025    HCT 38.1 (L) 06/24/2025     06/30/2025     06/24/2025     Lab Results   Component Value Date    LABPT 12.6 06/30/2025    INR 1.0 06/30/2025    APTT 24.6  06/30/2025     CHEMISTRY - LAST 2  Lab Results   Component Value Date     06/24/2025     06/05/2025    K 5.0 06/24/2025    K 4.4 06/05/2025    CO2 23 06/24/2025    CO2 27 06/05/2025    BUN 69 (H) 06/24/2025    BUN 51 (H) 06/05/2025    CREATININE 4.3 (H) 06/24/2025    CREATININE 3.5 (H) 06/17/2025     (H) 06/24/2025     (H) 06/05/2025    CALCIUM 9.3 06/24/2025    CALCIUM 9.1 06/05/2025    MG 2.0 06/05/2025    MG 2.1 06/04/2025    ALBUMIN 4.1 06/24/2025    ALBUMIN 3.3 (L) 06/05/2025    ALT 31 06/03/2025    ALT 27 05/09/2025    AST 21 06/03/2025    AST 12 05/09/2025      ENDOCRINE - LAST 2  Lab Results   Component Value Date    HGBA1C 5.9 05/02/2025    HGBA1C 5.6 06/24/2024    TSH 2.695 05/07/2025    TSH 5.477 (H) 06/24/2024        PHYSICAL EXAM  CONSTITUTIONAL: Well built, well nourished in no apparent distress  NECK: no carotid bruit, no JVD  LUNGS: CTA  CHEST WALL: no tenderness  HEART: regular rate and rhythm, S1, S2 normal, murmur present  ABDOMEN: soft, non-tender; bowel sounds normal; no masses,  no organomegaly  EXTREMITIES: Extremities normal, no edema, no calf tenderness noted  NEURO: AAO X 3    All pertinent data including labs, imaging, EKGs, and studies listed above were reviewed.     Current Outpatient Medications   Medication Instructions    calcitRIOL (ROCALTROL) 0.25 mcg, Oral, Daily    carvediloL (COREG) 12.5 mg, 2 times daily    estradioL (ESTRACE) 3 mg, Oral, 2 times daily    famotidine (PEPCID) 20 mg, Oral, 2 times daily    fluticasone propionate (FLONASE) 50 mcg, Each Nostril, 2 times daily    levothyroxine (SYNTHROID) 50 mcg, Oral, Before breakfast    losartan (COZAAR) 25 mg, Oral, Daily    minoxidiL (LONITEN) 2.5 mg, Oral, 2 times daily    sodium bicarbonate 1,300 mg, Oral, 2 times daily    torsemide (DEMADEX) 10 mg, Oral, Daily        Assessment & Plan   There are no diagnoses linked to this encounter.     Myocardial injury  Systolic and diastolic heart  failure  CKD  Bicuspid aortic valve with mild AS & mod to severe AR and dilation of TAA 4.1 cm   T2DM, HTN, HLD    Repeat echo in 6 months. Scheduled for December.    No follow-ups on file.     Naomi Peters, PA-C [facility]   Office: [phone]          [1]   Social History  Tobacco Use    Smoking status: Former    Smokeless tobacco: Former   Substance Use Topics    Alcohol use: Yes     Comment: rarely    Drug use: No

## 2025-07-03 NOTE — PROGRESS NOTES
Please see anam raymundos note.   Doing well, no symptoms or signs of AR.   Finally had kidney biopsy, results pending.   Repeat echo scheduled for 12/3, to see Dr. Castro afterward.   Established with CTS at [facility] previously.

## 2025-07-07 ENCOUNTER — OFFICE VISIT (OUTPATIENT)
Dept: ENDOCRINOLOGY | Facility: CLINIC | Age: 35
End: 2025-07-07
Payer: COMMERCIAL

## 2025-07-07 ENCOUNTER — LAB VISIT (OUTPATIENT)
Dept: LAB | Facility: HOSPITAL | Age: 35
End: 2025-07-07
Attending: INTERNAL MEDICINE
Payer: COMMERCIAL

## 2025-07-07 DIAGNOSIS — F64.0 TRANSGENDER WOMAN ON HORMONE THERAPY: Primary | ICD-10-CM

## 2025-07-07 DIAGNOSIS — E11.319 TYPE 2 DIABETES MELLITUS WITH RETINOPATHY, WITHOUT LONG-TERM CURRENT USE OF INSULIN, MACULAR EDEMA PRESENCE UNSPECIFIED, UNSPECIFIED LATERALITY, UNSPECIFIED RETINOPATHY SEVERITY: ICD-10-CM

## 2025-07-07 DIAGNOSIS — E03.8 SUBCLINICAL HYPOTHYROIDISM: ICD-10-CM

## 2025-07-07 DIAGNOSIS — E03.9 HYPOTHYROIDISM, UNSPECIFIED TYPE: ICD-10-CM

## 2025-07-07 DIAGNOSIS — Z79.899 TRANSGENDER WOMAN ON HORMONE THERAPY: Primary | ICD-10-CM

## 2025-07-07 LAB
ESTRADIOL SERPL HS-MCNC: 216 PG/ML (ref 11–44)
T4 FREE SERPL-MCNC: 0.92 NG/DL (ref 0.71–1.51)
TSH SERPL-ACNC: 4.8 UIU/ML (ref 0.4–4)

## 2025-07-07 PROCEDURE — 84439 ASSAY OF FREE THYROXINE: CPT

## 2025-07-07 PROCEDURE — 3044F HG A1C LEVEL LT 7.0%: CPT | Mod: CPTII,95,, | Performed by: INTERNAL MEDICINE

## 2025-07-07 PROCEDURE — G2211 COMPLEX E/M VISIT ADD ON: HCPCS | Mod: 95,,, | Performed by: INTERNAL MEDICINE

## 2025-07-07 PROCEDURE — 36415 COLL VENOUS BLD VENIPUNCTURE: CPT | Mod: PO

## 2025-07-07 PROCEDURE — 1160F RVW MEDS BY RX/DR IN RCRD: CPT | Mod: CPTII,95,, | Performed by: INTERNAL MEDICINE

## 2025-07-07 PROCEDURE — 98006 SYNCH AUDIO-VIDEO EST MOD 30: CPT | Mod: 95,,, | Performed by: INTERNAL MEDICINE

## 2025-07-07 PROCEDURE — 99212 OFFICE O/P EST SF 10 MIN: CPT | Performed by: INTERNAL MEDICINE

## 2025-07-07 PROCEDURE — 1159F MED LIST DOCD IN RCRD: CPT | Mod: CPTII,95,, | Performed by: INTERNAL MEDICINE

## 2025-07-07 PROCEDURE — 4010F ACE/ARB THERAPY RXD/TAKEN: CPT | Mod: CPTII,95,, | Performed by: INTERNAL MEDICINE

## 2025-07-07 PROCEDURE — 82670 ASSAY OF TOTAL ESTRADIOL: CPT

## 2025-07-07 PROCEDURE — 3066F NEPHROPATHY DOC TX: CPT | Mod: CPTII,95,, | Performed by: INTERNAL MEDICINE

## 2025-07-07 PROCEDURE — 84443 ASSAY THYROID STIM HORMONE: CPT

## 2025-07-07 RX ORDER — ESTRADIOL 0.1 MG/D
1 FILM, EXTENDED RELEASE TRANSDERMAL
Qty: 8 PATCH | Refills: 11 | Status: SHIPPED | OUTPATIENT
Start: 2025-07-07 | End: 2026-07-07

## 2025-07-07 NOTE — PATIENT INSTRUCTIONS
Thank you for completing a virtual visit with me!     Per our conversation, please stop your estrogen pills and start estrogen patches.  1 patch twice a week.  We will plan a visit in 3 months with labs prior.  If you are estrogen level is not at goal we can discuss switching over to injectable estrogen.    In addition I will try to order the genetic testing for different types of diabetes (EZEQUIEL)    Please let me know if you have any other questions.    Thank you,  Shruthi Juarez MD

## 2025-07-07 NOTE — ASSESSMENT & PLAN NOTE
Controlled  Still undergoing retinopathy treatments  Periodic labs   No evidence of T1DM      Check EZEQUIEL

## 2025-07-07 NOTE — PROGRESS NOTES
Subjective:      Patient ID: Miquel Salcedo is a 34 y.o. male.    Chief Complaint:  Gender     History of Present Illness  With regards to her gender incongruence care:    she did change her 's license but is holding off on the name change --  This is due to her estate    since the last visit she would is admitted for CHF acute renal failure.  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     Single   identifies Pansexual   Mom is aware           Also has T2DM   Diagnosed:  Age 14   - does not recall genetic testing   C-peptide and insulin levels were normal with an elevated glucose   Complications:  DR Eye exam 2017 proliferative  DR OD  Severe non proliferative  DR OS- seeing the retina specialist  - getting monthly injections   No numbness of tingling of her feet      Regimen: none   Was on Amaryl 4 qam  actos 30  insulin in the past          Was On a statin for dyslipidemia  - off      She also was told she has hypothyroidism   We restarted levothyroxine in February of 2022  her current dose is 50 mcg a day     Regarding pituitary:  Has seen NS - Dr Haynes   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  BP Readings from Last 3 Encounters:   07/03/25 117/73   06/30/25  (!) 111/59   06/17/25 122/84     Wt Readings from Last 1 Encounters:   07/03/25 0818 57.1 kg (125 lb 14.1 oz)     There is no height or weight on file to calculate BMI.      Physical Exam  Constitutional:       Appearance: Normal appearance.   Psychiatric:         Mood and Affect: Mood normal.         Behavior: Behavior normal.         Thought Content: Thought content normal.         Judgment: Judgment normal.                Lab Review:   Lab Results   Component Value Date    HGBA1C 5.9 05/02/2025     Lab Results   Component Value Date    CHOL 183 05/02/2025    HDL 66 05/02/2025    LDLCALC 81 05/02/2025    TRIG 180 05/02/2025    CHOLHDL 2.8 05/02/2025     Lab Results   Component Value Date     06/24/2025    K 5.0 06/24/2025    CL 99 06/24/2025    CO2 23 06/24/2025     (H) 06/24/2025    BUN 69 (H) 06/24/2025    CREATININE 4.3 (H) 06/24/2025    CALCIUM 9.3 06/24/2025    PROT 7.3 06/03/2025    ALBUMIN 4.1 06/24/2025    BILITOT 0.3 06/03/2025    ALKPHOS 74 06/03/2025    AST 21 06/03/2025    ALT 31 06/03/2025    ANIONGAP 15 06/24/2025    ESTGFRAFRICA 46.0 (A) 06/15/2022    EGFRNONAA 39.9 (A) 06/15/2022    TSH 2.695 05/07/2025     Vit D, 25-Hydroxy   Date Value Ref Range Status   06/24/2024 32 30 - 96 ng/mL Final     Comment:     Vitamin D deficiency.........<10 ng/mL                              Vitamin D insufficiency......10-29 ng/mL       Vitamin D sufficiency........> or equal to 30 ng/mL  Vitamin D toxicity............>100 ng/mL         Assessment and Plan     Transwoman (she/her)    Based on comorbidities we discussed stopping the estrogen pills and starting transdermal.  We will repeat labs in 3 months.          Healthy lifestyle stressed  Discussed increased risk of dvt   Avoid prolonged immobility  D/c ert prior to any procedures           Type 2 diabetes mellitus with retinopathy, without long-term current use of insulin  Controlled  Still undergoing retinopathy treatments  Periodic labs   No  evidence of T1DM      Check EZEQUIEL     Hypothyroid  Check tsh   Goal is nl    No changes.               The patient location is: [place]  The chief complaint leading to consultation is: above     Visit type: audiovisual    Level of service is based on medical decision-making and not time      Each patient to whom he or she provides medical services by telemedicine is:  (1) informed of the relationship between the physician and patient and the respective role of any other health care provider with respect to management of the patient; and (2) notified that he or she may decline to receive medical services by telemedicine and may withdraw from such care at any time.

## 2025-07-07 NOTE — ASSESSMENT & PLAN NOTE
Based on comorbidities we discussed stopping the estrogen pills and starting transdermal.  We will repeat labs in 3 months.          Healthy lifestyle stressed  Discussed increased risk of dvt   Avoid prolonged immobility  D/c ert prior to any procedures

## 2025-07-13 DIAGNOSIS — E03.8 SUBCLINICAL HYPOTHYROIDISM: ICD-10-CM

## 2025-07-14 RX ORDER — MINOXIDIL 2.5 MG/1
2.5 TABLET ORAL 2 TIMES DAILY
Qty: 180 TABLET | Refills: 1 | Status: SHIPPED | OUTPATIENT
Start: 2025-07-14 | End: 2026-01-10

## 2025-07-14 RX ORDER — LEVOTHYROXINE SODIUM 50 UG/1
50 TABLET ORAL
Qty: 90 TABLET | Refills: 3 | Status: SHIPPED | OUTPATIENT
Start: 2025-07-14

## 2025-07-28 ENCOUNTER — LAB VISIT (OUTPATIENT)
Dept: LAB | Facility: HOSPITAL | Age: 35
End: 2025-07-28
Attending: INTERNAL MEDICINE
Payer: COMMERCIAL

## 2025-07-28 DIAGNOSIS — N18.32 CKD STAGE 3B, GFR 30-44 ML/MIN: ICD-10-CM

## 2025-07-28 DIAGNOSIS — N18.32 STAGE 3B CHRONIC KIDNEY DISEASE: ICD-10-CM

## 2025-07-28 LAB
ABSOLUTE EOSINOPHIL (OHS): 0.57 K/UL
ABSOLUTE MONOCYTE (OHS): 0.91 K/UL (ref 0.3–1)
ABSOLUTE NEUTROPHIL COUNT (OHS): 4.97 K/UL (ref 1.8–7.7)
ALBUMIN SERPL BCP-MCNC: 4.2 G/DL (ref 3.5–5.2)
ANION GAP (OHS): 15 MMOL/L (ref 8–16)
BASOPHILS # BLD AUTO: 0.1 K/UL
BASOPHILS NFR BLD AUTO: 1.1 %
BUN SERPL-MCNC: 72 MG/DL (ref 6–20)
CALCIUM SERPL-MCNC: 10.1 MG/DL (ref 8.7–10.5)
CHLORIDE SERPL-SCNC: 103 MMOL/L (ref 95–110)
CO2 SERPL-SCNC: 20 MMOL/L (ref 23–29)
CREAT SERPL-MCNC: 4.3 MG/DL (ref 0.5–1.4)
CREAT UR-MCNC: 208 MG/DL (ref 23–375)
ERYTHROCYTE [DISTWIDTH] IN BLOOD BY AUTOMATED COUNT: 13 % (ref 11.5–14.5)
FERRITIN SERPL-MCNC: 2981 NG/ML (ref 20–300)
GFR SERPLBLD CREATININE-BSD FMLA CKD-EPI: 18 ML/MIN/1.73/M2
GLUCOSE SERPL-MCNC: 107 MG/DL (ref 70–110)
HCT VFR BLD AUTO: 29.4 % (ref 40–54)
HGB BLD-MCNC: 9.3 GM/DL (ref 14–18)
IMM GRANULOCYTES # BLD AUTO: 0.04 K/UL (ref 0–0.04)
IMM GRANULOCYTES NFR BLD AUTO: 0.5 % (ref 0–0.5)
IRON SATN MFR SERPL: 48 % (ref 20–50)
IRON SERPL-MCNC: 160 UG/DL (ref 45–160)
LYMPHOCYTES # BLD AUTO: 2.15 K/UL (ref 1–4.8)
MCH RBC QN AUTO: 27 PG (ref 27–31)
MCHC RBC AUTO-ENTMCNC: 31.6 G/DL (ref 32–36)
MCV RBC AUTO: 85 FL (ref 82–98)
NUCLEATED RBC (/100WBC) (OHS): 0 /100 WBC
PHOSPHATE SERPL-MCNC: 4.2 MG/DL (ref 2.7–4.5)
PLATELET # BLD AUTO: 307 K/UL (ref 150–450)
PMV BLD AUTO: 9.7 FL (ref 9.2–12.9)
POTASSIUM SERPL-SCNC: 4.6 MMOL/L (ref 3.5–5.1)
PROT UR-MCNC: 55 MG/DL
PROT/CREAT UR: 0.26 MG/G{CREAT}
PTH-INTACT SERPL-MCNC: 163.2 PG/ML (ref 9–77)
RBC # BLD AUTO: 3.45 M/UL (ref 4.6–6.2)
RELATIVE EOSINOPHIL (OHS): 6.5 %
RELATIVE LYMPHOCYTE (OHS): 24.6 % (ref 18–48)
RELATIVE MONOCYTE (OHS): 10.4 % (ref 4–15)
RELATIVE NEUTROPHIL (OHS): 56.9 % (ref 38–73)
SODIUM SERPL-SCNC: 138 MMOL/L (ref 136–145)
TIBC SERPL-MCNC: 332 UG/DL (ref 250–450)
TRANSFERRIN SERPL-MCNC: 224 MG/DL (ref 200–375)
WBC # BLD AUTO: 8.74 K/UL (ref 3.9–12.7)

## 2025-07-28 PROCEDURE — 82728 ASSAY OF FERRITIN: CPT

## 2025-07-28 PROCEDURE — 84466 ASSAY OF TRANSFERRIN: CPT

## 2025-07-28 PROCEDURE — 36415 COLL VENOUS BLD VENIPUNCTURE: CPT | Mod: PO

## 2025-07-28 PROCEDURE — 84156 ASSAY OF PROTEIN URINE: CPT

## 2025-07-28 PROCEDURE — 80069 RENAL FUNCTION PANEL: CPT

## 2025-07-28 PROCEDURE — 83970 ASSAY OF PARATHORMONE: CPT

## 2025-07-28 PROCEDURE — 85025 COMPLETE CBC W/AUTO DIFF WBC: CPT

## 2025-08-01 ENCOUNTER — OFFICE VISIT (OUTPATIENT)
Dept: NEPHROLOGY | Facility: CLINIC | Age: 35
End: 2025-08-01
Payer: COMMERCIAL

## 2025-08-01 VITALS
HEART RATE: 100 BPM | BODY MASS INDEX: 25.43 KG/M2 | SYSTOLIC BLOOD PRESSURE: 122 MMHG | DIASTOLIC BLOOD PRESSURE: 70 MMHG | OXYGEN SATURATION: 99 % | HEIGHT: 60 IN

## 2025-08-01 DIAGNOSIS — N25.81 SECONDARY HYPERPARATHYROIDISM OF RENAL ORIGIN: ICD-10-CM

## 2025-08-01 DIAGNOSIS — I10 HYPERTENSION, UNSPECIFIED TYPE: ICD-10-CM

## 2025-08-01 DIAGNOSIS — N18.32 ANEMIA IN STAGE 3B CHRONIC KIDNEY DISEASE: ICD-10-CM

## 2025-08-01 DIAGNOSIS — D63.1 ANEMIA IN STAGE 3B CHRONIC KIDNEY DISEASE: ICD-10-CM

## 2025-08-01 DIAGNOSIS — N18.4 CHRONIC KIDNEY DISEASE (CKD), STAGE IV (SEVERE): Primary | ICD-10-CM

## 2025-08-01 DIAGNOSIS — E87.20 METABOLIC ACIDOSIS: ICD-10-CM

## 2025-08-01 PROCEDURE — 3044F HG A1C LEVEL LT 7.0%: CPT | Mod: CPTII,S$GLB,, | Performed by: INTERNAL MEDICINE

## 2025-08-01 PROCEDURE — 99999 PR PBB SHADOW E&M-EST. PATIENT-LVL III: CPT | Mod: PBBFAC,,, | Performed by: INTERNAL MEDICINE

## 2025-08-01 PROCEDURE — 3074F SYST BP LT 130 MM HG: CPT | Mod: CPTII,S$GLB,, | Performed by: INTERNAL MEDICINE

## 2025-08-01 PROCEDURE — 1159F MED LIST DOCD IN RCRD: CPT | Mod: CPTII,S$GLB,, | Performed by: INTERNAL MEDICINE

## 2025-08-01 PROCEDURE — 1160F RVW MEDS BY RX/DR IN RCRD: CPT | Mod: CPTII,S$GLB,, | Performed by: INTERNAL MEDICINE

## 2025-08-01 PROCEDURE — 3078F DIAST BP <80 MM HG: CPT | Mod: CPTII,S$GLB,, | Performed by: INTERNAL MEDICINE

## 2025-08-01 PROCEDURE — 3008F BODY MASS INDEX DOCD: CPT | Mod: CPTII,S$GLB,, | Performed by: INTERNAL MEDICINE

## 2025-08-01 PROCEDURE — 3066F NEPHROPATHY DOC TX: CPT | Mod: CPTII,S$GLB,, | Performed by: INTERNAL MEDICINE

## 2025-08-01 PROCEDURE — 99214 OFFICE O/P EST MOD 30 MIN: CPT | Mod: S$GLB,,, | Performed by: INTERNAL MEDICINE

## 2025-08-01 PROCEDURE — 4010F ACE/ARB THERAPY RXD/TAKEN: CPT | Mod: CPTII,S$GLB,, | Performed by: INTERNAL MEDICINE

## 2025-08-07 ENCOUNTER — TELEPHONE (OUTPATIENT)
Dept: TRANSPLANT | Facility: CLINIC | Age: 35
End: 2025-08-07
Payer: COMMERCIAL

## 2025-08-12 ENCOUNTER — INFUSION (OUTPATIENT)
Dept: INFUSION THERAPY | Facility: HOSPITAL | Age: 35
End: 2025-08-12
Attending: NURSE PRACTITIONER
Payer: COMMERCIAL

## 2025-08-12 VITALS
OXYGEN SATURATION: 99 % | RESPIRATION RATE: 16 BRPM | SYSTOLIC BLOOD PRESSURE: 119 MMHG | DIASTOLIC BLOOD PRESSURE: 72 MMHG | HEART RATE: 86 BPM | TEMPERATURE: 98 F

## 2025-08-12 DIAGNOSIS — D64.9 SYMPTOMATIC ANEMIA: Primary | ICD-10-CM

## 2025-08-12 PROCEDURE — 63600175 PHARM REV CODE 636 W HCPCS: Mod: JZ,EC,TB,PN | Performed by: INTERNAL MEDICINE

## 2025-08-12 PROCEDURE — 96372 THER/PROPH/DIAG INJ SC/IM: CPT | Mod: PN

## 2025-08-12 RX ADMIN — EPOETIN ALFA 10000 UNITS: 10000 SOLUTION INTRAVENOUS; SUBCUTANEOUS at 02:08

## 2025-08-14 ENCOUNTER — PATIENT MESSAGE (OUTPATIENT)
Dept: CARDIOLOGY | Facility: CLINIC | Age: 35
End: 2025-08-14
Payer: COMMERCIAL

## 2025-08-14 DIAGNOSIS — I10 HYPERTENSION, UNSPECIFIED TYPE: Primary | ICD-10-CM

## 2025-08-15 RX ORDER — CARVEDILOL 12.5 MG/1
12.5 TABLET ORAL 2 TIMES DAILY
Qty: 180 TABLET | Refills: 3 | Status: SHIPPED | OUTPATIENT
Start: 2025-08-15

## 2025-08-18 ENCOUNTER — DOCUMENTATION ONLY (OUTPATIENT)
Dept: INFUSION THERAPY | Facility: HOSPITAL | Age: 35
End: 2025-08-18
Payer: COMMERCIAL

## 2025-08-18 DIAGNOSIS — D63.1 ANEMIA IN STAGE 3B CHRONIC KIDNEY DISEASE: ICD-10-CM

## 2025-08-18 DIAGNOSIS — N18.32 ANEMIA IN STAGE 3B CHRONIC KIDNEY DISEASE: ICD-10-CM

## 2025-08-18 DIAGNOSIS — D64.9 SYMPTOMATIC ANEMIA: Primary | ICD-10-CM

## 2025-08-19 ENCOUNTER — INFUSION (OUTPATIENT)
Dept: INFUSION THERAPY | Facility: HOSPITAL | Age: 35
End: 2025-08-19
Attending: NURSE PRACTITIONER
Payer: COMMERCIAL

## 2025-08-19 ENCOUNTER — LAB VISIT (OUTPATIENT)
Dept: LAB | Facility: HOSPITAL | Age: 35
End: 2025-08-19
Attending: INTERNAL MEDICINE
Payer: COMMERCIAL

## 2025-08-19 VITALS
RESPIRATION RATE: 18 BRPM | WEIGHT: 125.88 LBS | TEMPERATURE: 98 F | HEIGHT: 60 IN | SYSTOLIC BLOOD PRESSURE: 106 MMHG | BODY MASS INDEX: 24.71 KG/M2 | OXYGEN SATURATION: 97 % | DIASTOLIC BLOOD PRESSURE: 67 MMHG | HEART RATE: 104 BPM

## 2025-08-19 DIAGNOSIS — D64.9 SYMPTOMATIC ANEMIA: ICD-10-CM

## 2025-08-19 DIAGNOSIS — D63.1 ANEMIA IN STAGE 3B CHRONIC KIDNEY DISEASE: ICD-10-CM

## 2025-08-19 DIAGNOSIS — N18.32 ANEMIA IN STAGE 3B CHRONIC KIDNEY DISEASE: ICD-10-CM

## 2025-08-19 DIAGNOSIS — D64.9 SYMPTOMATIC ANEMIA: Primary | ICD-10-CM

## 2025-08-19 LAB
ABSOLUTE EOSINOPHIL (OHS): 0.54 K/UL
ABSOLUTE MONOCYTE (OHS): 0.78 K/UL (ref 0.3–1)
ABSOLUTE NEUTROPHIL COUNT (OHS): 5.34 K/UL (ref 1.8–7.7)
BASOPHILS # BLD AUTO: 0.06 K/UL
BASOPHILS NFR BLD AUTO: 0.7 %
ERYTHROCYTE [DISTWIDTH] IN BLOOD BY AUTOMATED COUNT: 14.4 % (ref 11.5–14.5)
HCT VFR BLD AUTO: 24.8 % (ref 40–54)
HGB BLD-MCNC: 8.7 GM/DL (ref 14–18)
IMM GRANULOCYTES # BLD AUTO: 0.03 K/UL (ref 0–0.04)
IMM GRANULOCYTES NFR BLD AUTO: 0.4 % (ref 0–0.5)
LYMPHOCYTES # BLD AUTO: 1.75 K/UL (ref 1–4.8)
MCH RBC QN AUTO: 29 PG (ref 27–31)
MCHC RBC AUTO-ENTMCNC: 35.1 G/DL (ref 32–36)
MCV RBC AUTO: 83 FL (ref 82–98)
NUCLEATED RBC (/100WBC) (OHS): 0 /100 WBC
PLATELET # BLD AUTO: 334 K/UL (ref 150–450)
PMV BLD AUTO: 9.2 FL (ref 9.2–12.9)
RBC # BLD AUTO: 3 M/UL (ref 4.6–6.2)
RELATIVE EOSINOPHIL (OHS): 6.4 %
RELATIVE LYMPHOCYTE (OHS): 20.6 % (ref 18–48)
RELATIVE MONOCYTE (OHS): 9.2 % (ref 4–15)
RELATIVE NEUTROPHIL (OHS): 62.7 % (ref 38–73)
WBC # BLD AUTO: 8.5 K/UL (ref 3.9–12.7)

## 2025-08-19 PROCEDURE — 96372 THER/PROPH/DIAG INJ SC/IM: CPT | Mod: PN

## 2025-08-19 PROCEDURE — 85025 COMPLETE CBC W/AUTO DIFF WBC: CPT | Mod: PN,TXP

## 2025-08-19 PROCEDURE — 36415 COLL VENOUS BLD VENIPUNCTURE: CPT | Mod: PN,TXP

## 2025-08-19 PROCEDURE — 63600175 PHARM REV CODE 636 W HCPCS: Mod: EC,TB,PN | Performed by: INTERNAL MEDICINE

## 2025-08-19 RX ADMIN — EPOETIN ALFA-EPBX 20000 UNITS: 20000 INJECTION, SOLUTION INTRAVENOUS; SUBCUTANEOUS at 11:08

## 2025-08-26 ENCOUNTER — LAB VISIT (OUTPATIENT)
Dept: LAB | Facility: HOSPITAL | Age: 35
End: 2025-08-26
Attending: NURSE PRACTITIONER
Payer: COMMERCIAL

## 2025-08-26 ENCOUNTER — INFUSION (OUTPATIENT)
Dept: INFUSION THERAPY | Facility: HOSPITAL | Age: 35
End: 2025-08-26
Attending: NURSE PRACTITIONER
Payer: COMMERCIAL

## 2025-08-26 VITALS
DIASTOLIC BLOOD PRESSURE: 51 MMHG | TEMPERATURE: 98 F | HEART RATE: 92 BPM | OXYGEN SATURATION: 99 % | SYSTOLIC BLOOD PRESSURE: 100 MMHG | RESPIRATION RATE: 17 BRPM

## 2025-08-26 DIAGNOSIS — D64.9 SYMPTOMATIC ANEMIA: Primary | ICD-10-CM

## 2025-08-26 PROCEDURE — 96372 THER/PROPH/DIAG INJ SC/IM: CPT | Mod: PN

## 2025-08-26 PROCEDURE — 63600175 PHARM REV CODE 636 W HCPCS: Mod: JZ,EC,TB,PN | Performed by: INTERNAL MEDICINE

## 2025-08-26 RX ORDER — SODIUM CHLORIDE 0.9 % (FLUSH) 0.9 %
10 SYRINGE (ML) INJECTION
OUTPATIENT
Start: 2025-08-26

## 2025-08-26 RX ORDER — HEPARIN 100 UNIT/ML
500 SYRINGE INTRAVENOUS
OUTPATIENT
Start: 2025-08-26

## 2025-08-26 RX ORDER — DIPHENHYDRAMINE HYDROCHLORIDE 50 MG/ML
50 INJECTION, SOLUTION INTRAMUSCULAR; INTRAVENOUS ONCE AS NEEDED
OUTPATIENT
Start: 2025-08-26 | End: 2037-01-22

## 2025-08-26 RX ORDER — EPINEPHRINE 0.3 MG/.3ML
0.3 INJECTION SUBCUTANEOUS ONCE AS NEEDED
OUTPATIENT
Start: 2025-08-26 | End: 2037-01-22

## 2025-08-26 RX ADMIN — EPOETIN ALFA-EPBX 20000 UNITS: 10000 INJECTION, SOLUTION INTRAVENOUS; SUBCUTANEOUS at 01:08

## 2025-09-02 ENCOUNTER — INFUSION (OUTPATIENT)
Dept: INFUSION THERAPY | Facility: HOSPITAL | Age: 35
End: 2025-09-02
Attending: NURSE PRACTITIONER
Payer: COMMERCIAL

## 2025-09-02 ENCOUNTER — LAB VISIT (OUTPATIENT)
Dept: LAB | Facility: HOSPITAL | Age: 35
End: 2025-09-02
Attending: NURSE PRACTITIONER
Payer: COMMERCIAL

## 2025-09-02 VITALS
HEIGHT: 60 IN | TEMPERATURE: 98 F | OXYGEN SATURATION: 99 % | SYSTOLIC BLOOD PRESSURE: 128 MMHG | DIASTOLIC BLOOD PRESSURE: 60 MMHG | HEART RATE: 89 BPM | BODY MASS INDEX: 24.71 KG/M2 | RESPIRATION RATE: 16 BRPM | WEIGHT: 125.88 LBS

## 2025-09-02 DIAGNOSIS — D63.1 ANEMIA IN STAGE 3B CHRONIC KIDNEY DISEASE: ICD-10-CM

## 2025-09-02 DIAGNOSIS — N18.32 ANEMIA IN STAGE 3B CHRONIC KIDNEY DISEASE: ICD-10-CM

## 2025-09-02 DIAGNOSIS — D64.9 SYMPTOMATIC ANEMIA: Primary | ICD-10-CM

## 2025-09-02 DIAGNOSIS — D64.9 SYMPTOMATIC ANEMIA: ICD-10-CM

## 2025-09-02 LAB
ABSOLUTE EOSINOPHIL (OHS): 0.32 K/UL
ABSOLUTE MONOCYTE (OHS): 0.67 K/UL (ref 0.3–1)
ABSOLUTE NEUTROPHIL COUNT (OHS): 3.72 K/UL (ref 1.8–7.7)
BASOPHILS # BLD AUTO: 0.08 K/UL
BASOPHILS NFR BLD AUTO: 1.3 %
ERYTHROCYTE [DISTWIDTH] IN BLOOD BY AUTOMATED COUNT: 19.4 % (ref 11.5–14.5)
HCT VFR BLD AUTO: 27.2 % (ref 40–54)
HGB BLD-MCNC: 9.2 GM/DL (ref 14–18)
IMM GRANULOCYTES # BLD AUTO: 0.03 K/UL (ref 0–0.04)
IMM GRANULOCYTES NFR BLD AUTO: 0.5 % (ref 0–0.5)
LYMPHOCYTES # BLD AUTO: 1.11 K/UL (ref 1–4.8)
MCH RBC QN AUTO: 30.5 PG (ref 27–31)
MCHC RBC AUTO-ENTMCNC: 33.8 G/DL (ref 32–36)
MCV RBC AUTO: 90 FL (ref 82–98)
NUCLEATED RBC (/100WBC) (OHS): 0 /100 WBC
PLATELET # BLD AUTO: 310 K/UL (ref 150–450)
PMV BLD AUTO: 9.2 FL (ref 9.2–12.9)
RBC # BLD AUTO: 3.02 M/UL (ref 4.6–6.2)
RELATIVE EOSINOPHIL (OHS): 5.4 %
RELATIVE LYMPHOCYTE (OHS): 18.7 % (ref 18–48)
RELATIVE MONOCYTE (OHS): 11.3 % (ref 4–15)
RELATIVE NEUTROPHIL (OHS): 62.8 % (ref 38–73)
WBC # BLD AUTO: 5.93 K/UL (ref 3.9–12.7)

## 2025-09-02 PROCEDURE — 36415 COLL VENOUS BLD VENIPUNCTURE: CPT | Mod: PN,TXP

## 2025-09-02 PROCEDURE — 85025 COMPLETE CBC W/AUTO DIFF WBC: CPT | Mod: PN,TXP

## 2025-09-02 PROCEDURE — 63600175 PHARM REV CODE 636 W HCPCS: Mod: JZ,EC,TB,PN | Performed by: INTERNAL MEDICINE

## 2025-09-02 PROCEDURE — 96372 THER/PROPH/DIAG INJ SC/IM: CPT | Mod: PN

## 2025-09-02 RX ADMIN — EPOETIN ALFA-EPBX 20000 UNITS: 10000 INJECTION, SOLUTION INTRAVENOUS; SUBCUTANEOUS at 02:09
